# Patient Record
Sex: FEMALE | Race: BLACK OR AFRICAN AMERICAN | NOT HISPANIC OR LATINO | Employment: OTHER | URBAN - METROPOLITAN AREA
[De-identification: names, ages, dates, MRNs, and addresses within clinical notes are randomized per-mention and may not be internally consistent; named-entity substitution may affect disease eponyms.]

---

## 2020-05-27 ENCOUNTER — TELEPHONE (OUTPATIENT)
Dept: GASTROENTEROLOGY | Facility: CLINIC | Age: 70
End: 2020-05-27

## 2020-07-15 ENCOUNTER — OFFICE VISIT (OUTPATIENT)
Dept: GASTROENTEROLOGY | Facility: CLINIC | Age: 70
End: 2020-07-15

## 2020-07-15 VITALS
DIASTOLIC BLOOD PRESSURE: 82 MMHG | TEMPERATURE: 97.3 F | SYSTOLIC BLOOD PRESSURE: 122 MMHG | OXYGEN SATURATION: 98 % | BODY MASS INDEX: 33.75 KG/M2 | HEART RATE: 64 BPM | HEIGHT: 66 IN | WEIGHT: 210 LBS

## 2020-07-15 DIAGNOSIS — K21.9 GASTROESOPHAGEAL REFLUX DISEASE, ESOPHAGITIS PRESENCE NOT SPECIFIED: ICD-10-CM

## 2020-07-15 DIAGNOSIS — R10.11 RIGHT UPPER QUADRANT ABDOMINAL PAIN: ICD-10-CM

## 2020-07-15 DIAGNOSIS — R74.8 ELEVATED LIVER ENZYMES: Primary | ICD-10-CM

## 2020-07-15 DIAGNOSIS — Z86.010 HISTORY OF COLON POLYPS: ICD-10-CM

## 2020-07-15 DIAGNOSIS — K31.84 GASTROPARESIS: ICD-10-CM

## 2020-07-15 PROCEDURE — 99214 OFFICE O/P EST MOD 30 MIN: CPT | Performed by: NURSE PRACTITIONER

## 2020-07-15 RX ORDER — ATORVASTATIN CALCIUM 40 MG/1
40 TABLET, FILM COATED ORAL DAILY
COMMUNITY
Start: 2020-05-07 | End: 2022-07-25

## 2020-07-15 RX ORDER — METOPROLOL SUCCINATE 50 MG/1
50 TABLET, EXTENDED RELEASE ORAL DAILY
COMMUNITY
End: 2022-11-03 | Stop reason: SDUPTHER

## 2020-07-15 RX ORDER — AMLODIPINE BESYLATE 10 MG/1
10 TABLET ORAL DAILY
COMMUNITY

## 2020-07-15 RX ORDER — VITAMIN B COMPLEX
1 CAPSULE ORAL DAILY
COMMUNITY
End: 2022-08-24

## 2020-07-15 RX ORDER — FUROSEMIDE 20 MG/1
20 TABLET ORAL DAILY
COMMUNITY
Start: 2020-05-05

## 2020-07-15 RX ORDER — PANTOPRAZOLE SODIUM 40 MG/1
TABLET, DELAYED RELEASE ORAL
COMMUNITY
Start: 2020-05-05 | End: 2020-07-15 | Stop reason: SDUPTHER

## 2020-07-15 RX ORDER — POTASSIUM CHLORIDE 750 MG/1
TABLET, FILM COATED, EXTENDED RELEASE ORAL
Status: ON HOLD | COMMUNITY
Start: 2020-06-21 | End: 2021-03-08

## 2020-07-15 RX ORDER — LOSARTAN POTASSIUM 50 MG/1
50 TABLET ORAL DAILY
COMMUNITY
Start: 2020-05-06

## 2020-07-15 RX ORDER — BIMATOPROST 0.01 %
1 DROPS OPHTHALMIC (EYE) NIGHTLY
COMMUNITY
Start: 2020-04-11

## 2020-07-15 RX ORDER — PANTOPRAZOLE SODIUM 40 MG/1
40 TABLET, DELAYED RELEASE ORAL DAILY
Qty: 90 TABLET | Refills: 3 | Status: SHIPPED | OUTPATIENT
Start: 2020-07-15 | End: 2021-07-15

## 2020-07-15 RX ORDER — MULTIVITAMIN
1 TABLET ORAL DAILY
COMMUNITY

## 2020-07-15 NOTE — PROGRESS NOTES
Heartburn and GI Problem      HPI  70-year-old female presents the office today for follow-up.  She was a patient in her previous practice and was last seen in office on 11/14/2019.  She has a history of hiatal hernia, Nissen fundoplication, delayed gastric emptying, GERD, elevated liver enzymes and intermittent diarrhea.    She reports experiencing heartburn and reflux symptoms 2 to 3 days/week.  Symptoms and occur at anytime during the day or night she can experience a burning/discomfort in her epigastric area.  She reports eating early in the evening and goes to bed between 9-9:30 pm each night.  She has had to utilize TUMS on an almost daily basis for management of symptoms.  Tomatoes will worsen symptoms.  She denies any nausea, vomiting, or dysphasia.  She continues pantoprazole 40 mg once daily, but takes this medication before bedtime.  Her most recent EGD was performed on 11/14/2018 and revealed LA grade B esophagitis, evidence of previous Nissen fundoplication, and fundic gland polyps.  Pathology revealed mild inflammation at the GE junction.    She has a history of delayed gastric emptying, as evidenced by gastric emptying study on 11/21/2018.  She reports eating smaller more frequent meals, but does not currently take any medication for gastroparesis.    She reports having normal daily bowel movements and denies having any lower abdominal pain, diarrhea, constipation, melena, or hematochezia.  Most recent colonoscopy was performed on 11/14/2018 and revealed 3 colon polyps, all of which were adenomatous.  She was placed in a 3-year recall and will be due for her next surveillance colonoscopy 11/14/2021.    She has a history of elevated liver enzymes and underwent work-up in 2019.  She was found to have an elevated AZALIA and IgG; however smooth muscle antibodies were negative and transaminases were not markedly elevated.  She was recommended to have a follow-up evaluation of liver enzymes in January 2020.   She reports experiencing some sharp right upper quadrant abdominal pain that occurred approximately 2 weeks ago.  Pain lasted approximately 10 to 15 minutes and then went away after use of 2 Advil.  She denies having any further pain since.  She is unsure if this was secondary to her liver or if she was having some sort of muscle spasm.  She reports having recent lab work 1 month ago through her PCP, Dr. Cobb's office.  She states that her liver enzymes had improved over her previous values.  This lab work is not available for review, but we will request.    Review of Systems   Constitutional: Negative for appetite change, chills, diaphoresis, fatigue, fever and unexpected weight change.   HENT: Negative for dental problem, ear pain, mouth sores, rhinorrhea, sore throat and voice change.    Eyes: Negative for pain, redness and visual disturbance.   Respiratory: Positive for cough. Negative for chest tightness and wheezing.    Cardiovascular: Negative for chest pain, palpitations and leg swelling.   Endocrine: Negative for cold intolerance, heat intolerance, polydipsia, polyphagia and polyuria.   Genitourinary: Negative for dysuria, frequency, hematuria and urgency.   Musculoskeletal: Negative for arthralgias, back pain, joint swelling, myalgias and neck pain.   Skin: Negative for rash.   Allergic/Immunologic: Negative for environmental allergies, food allergies and immunocompromised state.   Neurological: Negative for dizziness, seizures, weakness, numbness and headaches.   Hematological: Does not bruise/bleed easily.   Psychiatric/Behavioral: Negative for sleep disturbance. The patient is not nervous/anxious.         Problem List:  There is no problem list on file for this patient.      Medical History:    Past Medical History:   Diagnosis Date   • Cataract    • GERD (gastroesophageal reflux disease)    • Hyperlipidemia    • Hypertension    • Knee pain         Social History:    Social History     Socioeconomic  History   • Marital status:      Spouse name: Not on file   • Number of children: Not on file   • Years of education: Not on file   • Highest education level: Not on file   Tobacco Use   • Smoking status: Former Smoker   • Smokeless tobacco: Never Used   Substance and Sexual Activity   • Alcohol use: Yes   • Drug use: Never   • Sexual activity: Defer       Family History:   Family History   Problem Relation Age of Onset   • Alzheimer's disease Mother    • Kidney failure Father    • Alzheimer's disease Brother    • Lung cancer Brother    • Stomach cancer Brother    • Hypertension Neg Hx        Surgical History:   Past Surgical History:   Procedure Laterality Date   • COLONOSCOPY     • HEMORROIDECTOMY     • HERNIA REPAIR     • HYSTERECTOMY     • KNEE SURGERY     • TUBAL ABDOMINAL LIGATION     • UPPER GASTROINTESTINAL ENDOSCOPY           Current Outpatient Medications:   •  amLODIPine (NORVASC) 10 MG tablet, Take 10 mg by mouth Daily., Disp: , Rfl:   •  Aspirin Buf,CaCarb-MgCarb-MgO, 81 MG tablet, Take 81 mg by mouth Daily., Disp: , Rfl:   •  atorvastatin (LIPITOR) 40 MG tablet, , Disp: , Rfl:   •  b complex vitamins capsule, Take 1 capsule by mouth Daily., Disp: , Rfl:   •  furosemide (LASIX) 20 MG tablet, , Disp: , Rfl:   •  losartan (COZAAR) 50 MG tablet, , Disp: , Rfl:   •  LUMIGAN 0.01 % ophthalmic drops, , Disp: , Rfl:   •  metoprolol succinate XL (TOPROL-XL) 50 MG 24 hr tablet, Take 50 mg by mouth Daily., Disp: , Rfl:   •  Multiple Vitamin (MULTIVITAMIN) tablet, Take 1 tablet by mouth Daily., Disp: , Rfl:   •  pantoprazole (PROTONIX) 40 MG EC tablet, Take 1 tablet by mouth Daily. Take 1 hour before first meal of the day, Disp: 90 tablet, Rfl: 3  •  potassium chloride (K-DUR) 10 MEQ CR tablet, , Disp: , Rfl:     Allergies:   Allergies   Allergen Reactions   • Fish Oil Itching   • Rocuronium Anaphylaxis   • Shellfish-Derived Products Nausea And Vomiting        The following portions of the patient's history  were reviewed and updated as appropriate: allergies, current medications, past family history, past medical history, past social history, past surgical history and problem list.    Vitals:    07/15/20 1023   BP: 122/82   Pulse: 64   Temp: 97.3 °F (36.3 °C)   SpO2: 98%       Physical Exam   Constitutional: She is oriented to person, place, and time. She appears well-developed and well-nourished.   Pulmonary/Chest: Effort normal. No respiratory distress.   Abdominal: Soft. Bowel sounds are normal. She exhibits no distension and no mass. There is tenderness. There is no guarding.   Mild abdominal pain noted on palpation of epigastric area and right upper quadrant area   Musculoskeletal: Normal range of motion.   Neurological: She is alert and oriented to person, place, and time.   Skin: Skin is warm and dry.   Psychiatric: She has a normal mood and affect. Her behavior is normal. Judgment and thought content normal.   Vitals reviewed.      Assessment/ Plan  Esme was seen today for heartburn and gi problem.    Diagnoses and all orders for this visit:    Elevated liver enzymes  -     US Abdomen Limited; Future    Right upper quadrant abdominal pain  -     US Abdomen Limited; Future    Gastroesophageal reflux disease, esophagitis presence not specified    History of colon polyps    Gastroparesis    Other orders  -     pantoprazole (PROTONIX) 40 MG EC tablet; Take 1 tablet by mouth Daily. Take 1 hour before first meal of the day         Return in about 6 months (around 1/15/2021).      Discussion:  Obtain the patient's most recent lab work from her PCP, Dr. Rich's office for our review to assess her liver enzymes.  Due to reports of right upper quadrant abdominal pain we will also obtain an ultrasound.    For GERD, we will have the patient change the timing of her pantoprazole and take 40 mg an hour before her first meal of the day.  Patient was encouraged to avoid eating 3 to 4 hours before bedtime and to avoid  any known trigger foods.  May continue to use Tums on an as-needed basis.  Based upon patient's response, may need to consider adding famotidine 40 mg at bedtime to her regimen.  Could continue eating smaller more frequent meals for management of gastroparesis.    For her history of colon polyps, we will place her in recall for her next surveillance colonoscopy, which will be due 11/14/2021.  Plan for office follow-up in 6 months for reassessment of symptoms, or sooner should symptoms worsen.  Patient verbalized understanding of above plan of care and is in agreement. All questions answered and support provided.    Patient's lab work was received from Dr. Cobb's office.  Performed on 6/4/2020 demonstrated an AST of 52 and ALT of 56.  We will plan to proceed with right upper quadrant ultrasound for further evaluation and provide further recommendations once results have returned.  Johnnie GONZALES

## 2020-07-15 NOTE — PATIENT INSTRUCTIONS
1.  For GERD, we will have you change the timing of your pantoprazole to 1 hour before your first meal of the day.  Refills have been sent to [facility].  May continue to use TUMS on an as-needed basis for breakthrough symptoms.    2.  For GERD, we recommend avoiding eating 3-4 hours before bedtime, eating smaller more frequent meals, and avoiding any known food triggers including spicy foods, tomatoes and tomato-based sauces, chocolate, coffee/tea, citrus fruits, carbonated  beverages and alcohol.     3.  For your history of elevated liver enzymes, we will contact Dr. Cobb's office to request her most recent lab work for our review.  If additional lab work is needed we will contact you.    4.  For further evaluation of right upper quadrant abdominal pain in the setting of elevated liver enzymes we will also order a right upper quadrant ultrasound.    5.  For delayed gastric emptying, we recommend eating smaller more frequent meals and avoiding foods that are high in fiber and fat.    6.  For your history of colon polyps, we will place you in our electronic reminder system for your next surveillance colonoscopy November 14, 2021.    7.  Plan for office follow-up in 6 months for reassessment of symptoms, or sooner should symptoms worsen.

## 2020-07-24 ENCOUNTER — HOSPITAL ENCOUNTER (OUTPATIENT)
Dept: ULTRASOUND IMAGING | Facility: HOSPITAL | Age: 70
Discharge: HOME | End: 2020-07-24
Admitting: NURSE PRACTITIONER

## 2020-07-24 DIAGNOSIS — R10.11 RIGHT UPPER QUADRANT ABDOMINAL PAIN: ICD-10-CM

## 2020-07-24 DIAGNOSIS — R74.8 ELEVATED LIVER ENZYMES: ICD-10-CM

## 2020-07-24 PROCEDURE — 76705 ECHO EXAM OF ABDOMEN: CPT

## 2020-07-29 DIAGNOSIS — R10.11 RIGHT UPPER QUADRANT ABDOMINAL PAIN: Primary | ICD-10-CM

## 2020-11-04 ENCOUNTER — HOSPITAL ENCOUNTER (OUTPATIENT)
Dept: ULTRASOUND IMAGING | Facility: HOSPITAL | Age: 70
Discharge: HOME | End: 2020-11-04
Admitting: NURSE PRACTITIONER

## 2020-11-04 DIAGNOSIS — R10.11 RIGHT UPPER QUADRANT ABDOMINAL PAIN: ICD-10-CM

## 2020-11-04 PROCEDURE — 76705 ECHO EXAM OF ABDOMEN: CPT

## 2020-11-09 DIAGNOSIS — K82.4 POLYP OF GALLBLADDER: Primary | ICD-10-CM

## 2021-02-05 ENCOUNTER — OFFICE VISIT (OUTPATIENT)
Dept: GASTROENTEROLOGY | Facility: CLINIC | Age: 71
End: 2021-02-05

## 2021-02-05 ENCOUNTER — PREP FOR SURGERY (OUTPATIENT)
Dept: SURGERY | Facility: SURGERY CENTER | Age: 71
End: 2021-02-05

## 2021-02-05 VITALS
HEIGHT: 66 IN | SYSTOLIC BLOOD PRESSURE: 132 MMHG | DIASTOLIC BLOOD PRESSURE: 78 MMHG | BODY MASS INDEX: 34.07 KG/M2 | WEIGHT: 212 LBS | HEART RATE: 71 BPM | TEMPERATURE: 97.3 F | OXYGEN SATURATION: 98 %

## 2021-02-05 DIAGNOSIS — R74.8 ELEVATED LIVER ENZYMES: ICD-10-CM

## 2021-02-05 DIAGNOSIS — Z86.010 HISTORY OF COLONIC POLYPS: ICD-10-CM

## 2021-02-05 DIAGNOSIS — R10.13 EPIGASTRIC PAIN: ICD-10-CM

## 2021-02-05 DIAGNOSIS — Z98.890 HISTORY OF NISSEN FUNDOPLICATION: ICD-10-CM

## 2021-02-05 DIAGNOSIS — Z12.11 COLON CANCER SCREENING: ICD-10-CM

## 2021-02-05 DIAGNOSIS — K21.9 GASTROESOPHAGEAL REFLUX DISEASE, UNSPECIFIED WHETHER ESOPHAGITIS PRESENT: Primary | ICD-10-CM

## 2021-02-05 DIAGNOSIS — R10.11 RIGHT UPPER QUADRANT ABDOMINAL PAIN: ICD-10-CM

## 2021-02-05 DIAGNOSIS — K21.9 GASTROESOPHAGEAL REFLUX DISEASE, UNSPECIFIED WHETHER ESOPHAGITIS PRESENT: Primary | Chronic | ICD-10-CM

## 2021-02-05 PROBLEM — Z86.0100 HISTORY OF COLONIC POLYPS: Status: ACTIVE | Noted: 2021-02-05

## 2021-02-05 PROCEDURE — 99214 OFFICE O/P EST MOD 30 MIN: CPT | Performed by: NURSE PRACTITIONER

## 2021-02-05 RX ORDER — SODIUM CHLORIDE 0.9 % (FLUSH) 0.9 %
3 SYRINGE (ML) INJECTION EVERY 12 HOURS SCHEDULED
Status: CANCELLED | OUTPATIENT
Start: 2021-02-05

## 2021-02-05 RX ORDER — SODIUM CHLORIDE 0.9 % (FLUSH) 0.9 %
10 SYRINGE (ML) INJECTION AS NEEDED
Status: CANCELLED | OUTPATIENT
Start: 2021-02-05

## 2021-02-05 RX ORDER — SODIUM CHLORIDE, SODIUM LACTATE, POTASSIUM CHLORIDE, CALCIUM CHLORIDE 600; 310; 30; 20 MG/100ML; MG/100ML; MG/100ML; MG/100ML
30 INJECTION, SOLUTION INTRAVENOUS CONTINUOUS PRN
Status: CANCELLED | OUTPATIENT
Start: 2021-02-05

## 2021-02-05 NOTE — PROGRESS NOTES
"Chief Complaint   Patient presents with   • Follow-up     6 mon f/u    • Burn     Burning of the stomach              History of Present Illness  70-year-old female presents the office today for follow-up.  She was last seen in office on 7/15/2020.  She has a history of Nissen fundoplication, delayed gastric emptying, GERD, elevated liver enzymes, and intermittent diarrhea.    At her last office visit we had her change the timing of her pantoprazole prior to her evening meal and recommended use of Tums on an as-needed basis for worsening epigastric pain.  She reports changing the timing of her pantoprazole and adding Pepcid into her regimen.  However, she reports having to take a significant amount of Pepcid each day to help manage symptoms with no improvement.  She has been experiencing severe epigastric burning for several weeks, but states over the past few days symptoms have slightly improved.  Last EGD noted below November 2018 revealed LA grade B esophagitis, and previous evidence of Nissen fundoplication which appeared to possibly be loose, as well as some gastric polyps.  She denies any nausea, vomiting, or dysphagia.    She also reports persistent right upper quadrant abdominal pain, particularly over the past 1.5 weeks.  She describes the discomfort as a \"knot\" in her right upper quadrant.  Use of Advil did ease the pain, but she tries to limit use.  Eating does not seem to affect symptoms.  She reports 2 episodes that occurred last week. Follow-up right upper quadrant ultrasound performed on 11/4/2020 demonstrated stability of 0.4 cm gallbladder polyp versus nonshadowing gallstone, otherwise was unremarkable.  She has not had a HIDA scan.    She has a history of elevated liver enzymes underwent full liver lab work-up in 2019 which demonstrated an elevated AZALIA and IgG, but smooth muscle antibodies were negative and transaminases were not markedly elevated.  Lab work was previously requested from  " "Layla's office, but was not received.  We will request again.    She has a history of colon polyps and is due for her next surveillance colonoscopy on 11/14/2021.  She reports occasional constipation and has questions regarding use of Metamucil.  She reports an average of one bowel movement per day denies any family history of colon cancer.    Review of Systems   HENT: Negative for trouble swallowing.    Cardiovascular: Negative for chest pain.   Gastrointestinal: Positive for abdominal pain and constipation. Negative for abdominal distention, anal bleeding, blood in stool, diarrhea, nausea, rectal pain and vomiting.        Result Review :      Office Visit with Shaye Coates APRN (07/15/2020)  SCANNED - COLONOSCOPY (11/14/2018)  ENDOSCOPY, INT (11/14/2018)  SCANNED PATHOLOGY (11/14/2018)  US Abdomen Limited (11/04/2020 11:02)      Vital Signs:   /78   Pulse 71   Temp 97.3 °F (36.3 °C)   Ht 166.4 cm (65.51\")   Wt 96.2 kg (212 lb)   SpO2 98%   BMI 34.73 kg/m²     Body mass index is 34.73 kg/m².     Physical Exam  Vitals signs reviewed.   Constitutional:       Appearance: Normal appearance.   Pulmonary:      Effort: Pulmonary effort is normal. No respiratory distress.   Abdominal:      General: Abdomen is flat. Bowel sounds are normal. There is no distension.      Palpations: Abdomen is soft. There is no mass.      Tenderness: There is abdominal tenderness. There is no guarding or rebound.      Comments: Mild epigastric pain noted on palpation   Musculoskeletal: Normal range of motion.   Skin:     General: Skin is warm and dry.   Neurological:      General: No focal deficit present.      Mental Status: She is alert and oriented to person, place, and time.   Psychiatric:         Mood and Affect: Mood normal.         Behavior: Behavior normal.         Thought Content: Thought content normal.         Judgment: Judgment normal.          Assessment and Plan      Diagnoses and all orders for this " visit:    1. Gastroesophageal reflux disease, unspecified whether esophagitis present (Primary)  Comments:  Worsening      2. Epigastric pain    3. History of Nissen fundoplication    4. History of colonic polyps    5. Colon cancer screening    6. Elevated liver enzymes  -     Hepatic Function Panel    7. Right upper quadrant abdominal pain            Follow Up   Return in about 4 weeks (around 3/5/2021).     Patient Instructions   1.  For GERD, continue pantoprazole 40 mg 1 hour before your evening meal.  You may continue to use Pepcid up to twice daily as needed for breakthrough symptoms.    2.  For GERD, we recommend avoiding eating 3-4 hours before bedtime, eating smaller more frequent meals, and avoiding any known food triggers including spicy foods, tomatoes and tomato-based sauces, chocolate, coffee/tea, citrus fruits, carbonated  beverages and alcohol.     3.  For further evaluation of worsening epigastric pain we will proceed with EGD for further evaluation.    4.  Due to your history of colon polyps, we will go ahead and schedule your surveillance colonoscopy as well.    5.  Due to your history of elevated liver enzymes, we will check a hepatic function panel today.    6.  To help regulate bowel habits we recommend use of Metamucil daily, starting off with a low dose and titrating accordingly to help minimize gas that could occur.    7.  Plan for follow-up office visit 4 weeks after procedures to review findings, reassess symptoms and discuss plan of care moving forward.        Discussion:    For worsening epigastric pain and GERD, we will proceed with EGD for further evaluation.  We will have the patient continue pantoprazole 40 mg before her evening meal as well as continue to use Pepcid for symptom management.  We have recommended she implement antireflux precautions and will proceed with EGD for further evaluation.  Symptoms could be partially due to a slipped Nissen, which may need to be further  evaluated on esophagram.  However, we will defer this testing until after EGD has been completed.     In regards to the patient's right upper quadrant abdominal discomfort, we will defer referral to general surgery until after EGD has been completed to determine if patient also needs to be evaluated for repair of her previous Nissen fundoplication.    Due to her history of colon polyps and the fact that she is due for her surveillance colonoscopy this year, we will go ahead and proceed with colonoscopy so as to avoid a second procedure later this year with anesthesia, given her age.  We have recommended that she utilize Metamucil on a daily basis for management of constipation.  We plan for office follow-up 4 weeks after procedure to discuss results, reassess symptoms, and devise plan of care moving forward.  Patient verbalized understanding of above plan of care and is in agreement.  All questions answered and support provided.

## 2021-02-05 NOTE — PATIENT INSTRUCTIONS
1.  For GERD, continue pantoprazole 40 mg 1 hour before your evening meal.  You may continue to use Pepcid up to twice daily as needed for breakthrough symptoms.    2.  For GERD, we recommend avoiding eating 3-4 hours before bedtime, eating smaller more frequent meals, and avoiding any known food triggers including spicy foods, tomatoes and tomato-based sauces, chocolate, coffee/tea, citrus fruits, carbonated  beverages and alcohol.     3.  For further evaluation of worsening epigastric pain we will proceed with EGD for further evaluation.    4.  Due to your history of colon polyps, we will go ahead and schedule your surveillance colonoscopy as well.    5.  Due to your history of elevated liver enzymes, we will check a hepatic function panel today.    6.  To help regulate bowel habits we recommend use of Metamucil daily, starting off with a low dose and titrating accordingly to help minimize gas that could occur.    7.  Plan for follow-up office visit 4 weeks after procedures to review findings, reassess symptoms and discuss plan of care moving forward.

## 2021-02-15 ENCOUNTER — TRANSCRIBE ORDERS (OUTPATIENT)
Dept: LAB | Facility: SURGERY CENTER | Age: 71
End: 2021-02-15

## 2021-02-15 DIAGNOSIS — Z01.818 OTHER SPECIFIED PRE-OPERATIVE EXAMINATION: Primary | ICD-10-CM

## 2021-02-25 DIAGNOSIS — R10.11 RIGHT UPPER QUADRANT ABDOMINAL PAIN: ICD-10-CM

## 2021-02-25 DIAGNOSIS — R74.8 ELEVATED LIVER ENZYMES: Primary | ICD-10-CM

## 2021-02-26 ENCOUNTER — LAB (OUTPATIENT)
Dept: LAB | Facility: HOSPITAL | Age: 71
End: 2021-02-26

## 2021-02-26 LAB
IGA1 MFR SER: 206 MG/DL (ref 70–400)
IGG1 SER-MCNC: 1628 MG/DL (ref 700–1600)
IGM SERPL-MCNC: 46 MG/DL (ref 40–230)

## 2021-02-26 PROCEDURE — 82784 ASSAY IGA/IGD/IGG/IGM EACH: CPT | Performed by: INTERNAL MEDICINE

## 2021-02-26 PROCEDURE — 36415 COLL VENOUS BLD VENIPUNCTURE: CPT | Performed by: INTERNAL MEDICINE

## 2021-02-26 PROCEDURE — 83516 IMMUNOASSAY NONANTIBODY: CPT | Performed by: INTERNAL MEDICINE

## 2021-02-27 LAB — ACTIN IGG SERPL-ACNC: 24 UNITS (ref 0–19)

## 2021-03-01 DIAGNOSIS — K75.9 INFLAMMATORY LIVER DISEASE, UNSPECIFIED: ICD-10-CM

## 2021-03-01 DIAGNOSIS — R79.89 ELEVATED LFTS: Primary | ICD-10-CM

## 2021-03-01 DIAGNOSIS — K75.4 AUTOIMMUNE HEPATITIS (HCC): ICD-10-CM

## 2021-03-04 NOTE — SIGNIFICANT NOTE
Education provided the Patient on the following:    - Nothing to Eat or Drink after MN the night before the procedure  -Your required COVID Test is Scheduled on   3/5/21       Between the Hours of [id number]  -You will only be notified if your COVID test Result is POSITIVE  -The importance of reducing your number of contacts by self quarantining after you COVID test until the date of your Colonoscopy and EGD    - Avoid red/purple fluids while completing their bowel prep as ordered by physician  -Contact Gastrointerologist office for any questions about specific details regarding colon prep    -You will need to have someone drive you home after your colonoscopy and remain with you for 24 hours after the procedure  - The date of your procedure, your ride is welcome to either remain in our parking lot or within 10-15 minutes of [facility] [place]  -Please wear warm socks when you arrive for your procedure  -Remove all jewelry and leave any valuables before arriving the day of your procedure (all will have to be removed before leaving preop)  -You will need to arrive at  0930         on  3/8/21         for your procedure    -Feel free to contact us at: [phone] with any additional questions/concerns         Has the patient ever tested Positive COVID?     If so when?

## 2021-03-05 ENCOUNTER — LAB (OUTPATIENT)
Dept: LAB | Facility: HOSPITAL | Age: 71
End: 2021-03-05

## 2021-03-05 ENCOUNTER — LAB (OUTPATIENT)
Dept: LAB | Facility: SURGERY CENTER | Age: 71
End: 2021-03-05

## 2021-03-05 DIAGNOSIS — Z01.818 OTHER SPECIFIED PRE-OPERATIVE EXAMINATION: ICD-10-CM

## 2021-03-05 LAB
ALBUMIN SERPL-MCNC: 3.8 G/DL (ref 3.5–5.2)
ALBUMIN/GLOB SERPL: 1.1 G/DL
ALP SERPL-CCNC: 101 U/L (ref 39–117)
ALPHA1 GLOB MFR UR ELPH: 137 MG/DL (ref 90–200)
ALT SERPL W P-5'-P-CCNC: 111 U/L (ref 1–33)
ANION GAP SERPL CALCULATED.3IONS-SCNC: 9.4 MMOL/L (ref 5–15)
AST SERPL-CCNC: 112 U/L (ref 1–32)
BASOPHILS # BLD AUTO: 0.04 10*3/MM3 (ref 0–0.2)
BASOPHILS NFR BLD AUTO: 0.6 % (ref 0–1.5)
BILIRUB SERPL-MCNC: 1.2 MG/DL (ref 0–1.2)
BUN SERPL-MCNC: 15 MG/DL (ref 8–23)
BUN/CREAT SERPL: 18.1 (ref 7–25)
CALCIUM SPEC-SCNC: 9.9 MG/DL (ref 8.6–10.5)
CERULOPLASMIN SERPL-MCNC: 22 MG/DL (ref 19–39)
CHLORIDE SERPL-SCNC: 104 MMOL/L (ref 98–107)
CO2 SERPL-SCNC: 24.6 MMOL/L (ref 22–29)
CREAT SERPL-MCNC: 0.83 MG/DL (ref 0.57–1)
DEPRECATED RDW RBC AUTO: 47 FL (ref 37–54)
EOSINOPHIL # BLD AUTO: 0.15 10*3/MM3 (ref 0–0.4)
EOSINOPHIL NFR BLD AUTO: 2.3 % (ref 0.3–6.2)
ERYTHROCYTE [DISTWIDTH] IN BLOOD BY AUTOMATED COUNT: 13.3 % (ref 12.3–15.4)
FERRITIN SERPL-MCNC: 197 NG/ML (ref 13–150)
GFR SERPL CREATININE-BSD FRML MDRD: 82 ML/MIN/1.73
GGT SERPL-CCNC: 224 U/L (ref 5–36)
GLOBULIN UR ELPH-MCNC: 3.6 GM/DL
GLUCOSE SERPL-MCNC: 101 MG/DL (ref 65–99)
HAV IGM SERPL QL IA: NORMAL
HBV CORE IGM SERPL QL IA: NORMAL
HBV SURFACE AG SERPL QL IA: NORMAL
HCT VFR BLD AUTO: 46.7 % (ref 34–46.6)
HCV AB SER DONR QL: NORMAL
HGB BLD-MCNC: 14.8 G/DL (ref 12–15.9)
IGA1 MFR SER: 216 MG/DL (ref 70–400)
IGG1 SER-MCNC: 1580 MG/DL (ref 700–1600)
IGM SERPL-MCNC: 46 MG/DL (ref 40–230)
IMM GRANULOCYTES # BLD AUTO: 0.02 10*3/MM3 (ref 0–0.05)
IMM GRANULOCYTES NFR BLD AUTO: 0.3 % (ref 0–0.5)
IRON 24H UR-MRATE: 113 MCG/DL (ref 37–145)
IRON SATN MFR SERPL: 27 % (ref 20–50)
LYMPHOCYTES # BLD AUTO: 2.43 10*3/MM3 (ref 0.7–3.1)
LYMPHOCYTES NFR BLD AUTO: 37 % (ref 19.6–45.3)
MCH RBC QN AUTO: 30.1 PG (ref 26.6–33)
MCHC RBC AUTO-ENTMCNC: 31.7 G/DL (ref 31.5–35.7)
MCV RBC AUTO: 94.9 FL (ref 79–97)
MONOCYTES # BLD AUTO: 0.89 10*3/MM3 (ref 0.1–0.9)
MONOCYTES NFR BLD AUTO: 13.6 % (ref 5–12)
NEUTROPHILS NFR BLD AUTO: 3.03 10*3/MM3 (ref 1.7–7)
NEUTROPHILS NFR BLD AUTO: 46.2 % (ref 42.7–76)
NRBC BLD AUTO-RTO: 0 /100 WBC (ref 0–0.2)
PLATELET # BLD AUTO: 184 10*3/MM3 (ref 140–450)
PMV BLD AUTO: 10.8 FL (ref 6–12)
POTASSIUM SERPL-SCNC: 3.4 MMOL/L (ref 3.5–5.2)
PROT SERPL-MCNC: 7.4 G/DL (ref 6–8.5)
RBC # BLD AUTO: 4.92 10*6/MM3 (ref 3.77–5.28)
SARS-COV-2 ORF1AB RESP QL NAA+PROBE: NOT DETECTED
SODIUM SERPL-SCNC: 138 MMOL/L (ref 136–145)
TIBC SERPL-MCNC: 423 MCG/DL (ref 298–536)
TRANSFERRIN SERPL-MCNC: 284 MG/DL (ref 200–360)
WBC # BLD AUTO: 6.56 10*3/MM3 (ref 3.4–10.8)

## 2021-03-05 PROCEDURE — U0004 COV-19 TEST NON-CDC HGH THRU: HCPCS | Performed by: SURGERY

## 2021-03-05 PROCEDURE — 86225 DNA ANTIBODY NATIVE: CPT | Performed by: INTERNAL MEDICINE

## 2021-03-05 PROCEDURE — 85025 COMPLETE CBC W/AUTO DIFF WBC: CPT | Performed by: INTERNAL MEDICINE

## 2021-03-05 PROCEDURE — 82103 ALPHA-1-ANTITRYPSIN TOTAL: CPT | Performed by: INTERNAL MEDICINE

## 2021-03-05 PROCEDURE — 83516 IMMUNOASSAY NONANTIBODY: CPT | Performed by: INTERNAL MEDICINE

## 2021-03-05 PROCEDURE — 82784 ASSAY IGA/IGD/IGG/IGM EACH: CPT | Performed by: INTERNAL MEDICINE

## 2021-03-05 PROCEDURE — 80074 ACUTE HEPATITIS PANEL: CPT | Performed by: INTERNAL MEDICINE

## 2021-03-05 PROCEDURE — 83540 ASSAY OF IRON: CPT | Performed by: INTERNAL MEDICINE

## 2021-03-05 PROCEDURE — 82390 ASSAY OF CERULOPLASMIN: CPT | Performed by: INTERNAL MEDICINE

## 2021-03-05 PROCEDURE — 86255 FLUORESCENT ANTIBODY SCREEN: CPT | Performed by: INTERNAL MEDICINE

## 2021-03-05 PROCEDURE — 86376 MICROSOMAL ANTIBODY EACH: CPT | Performed by: INTERNAL MEDICINE

## 2021-03-05 PROCEDURE — 80053 COMPREHEN METABOLIC PANEL: CPT | Performed by: INTERNAL MEDICINE

## 2021-03-05 PROCEDURE — C9803 HOPD COVID-19 SPEC COLLECT: HCPCS

## 2021-03-05 PROCEDURE — 82977 ASSAY OF GGT: CPT | Performed by: INTERNAL MEDICINE

## 2021-03-05 PROCEDURE — 86038 ANTINUCLEAR ANTIBODIES: CPT | Performed by: INTERNAL MEDICINE

## 2021-03-05 PROCEDURE — 36415 COLL VENOUS BLD VENIPUNCTURE: CPT | Performed by: INTERNAL MEDICINE

## 2021-03-05 PROCEDURE — 82728 ASSAY OF FERRITIN: CPT | Performed by: INTERNAL MEDICINE

## 2021-03-05 PROCEDURE — U0005 INFEC AGEN DETEC AMPLI PROBE: HCPCS | Performed by: SURGERY

## 2021-03-05 PROCEDURE — 84466 ASSAY OF TRANSFERRIN: CPT | Performed by: INTERNAL MEDICINE

## 2021-03-06 LAB
ACTIN IGG SERPL-ACNC: 28 UNITS (ref 0–19)
LKM-1 AB SER-ACNC: 2.3 UNITS (ref 0–20)
MITOCHONDRIA M2 IGG SER-ACNC: 20.5 UNITS (ref 0–20)

## 2021-03-07 LAB
ANA SER QL: POSITIVE
DSDNA AB SER-ACNC: 2 IU/ML (ref 0–9)
Lab: NORMAL

## 2021-03-08 ENCOUNTER — ANESTHESIA EVENT (OUTPATIENT)
Dept: SURGERY | Facility: SURGERY CENTER | Age: 71
End: 2021-03-08

## 2021-03-08 ENCOUNTER — ANESTHESIA (OUTPATIENT)
Dept: SURGERY | Facility: SURGERY CENTER | Age: 71
End: 2021-03-08

## 2021-03-08 ENCOUNTER — HOSPITAL ENCOUNTER (OUTPATIENT)
Facility: SURGERY CENTER | Age: 71
Setting detail: HOSPITAL OUTPATIENT SURGERY
Discharge: HOME | End: 2021-03-08
Attending: INTERNAL MEDICINE | Admitting: INTERNAL MEDICINE

## 2021-03-08 VITALS
BODY MASS INDEX: 33.43 KG/M2 | TEMPERATURE: 98.2 F | HEART RATE: 82 BPM | WEIGHT: 208 LBS | OXYGEN SATURATION: 95 % | DIASTOLIC BLOOD PRESSURE: 74 MMHG | HEIGHT: 66 IN | SYSTOLIC BLOOD PRESSURE: 122 MMHG | RESPIRATION RATE: 16 BRPM

## 2021-03-08 DIAGNOSIS — K21.9 GASTROESOPHAGEAL REFLUX DISEASE, UNSPECIFIED WHETHER ESOPHAGITIS PRESENT: ICD-10-CM

## 2021-03-08 DIAGNOSIS — Z98.890 HISTORY OF NISSEN FUNDOPLICATION: ICD-10-CM

## 2021-03-08 DIAGNOSIS — Z86.010 HISTORY OF COLONIC POLYPS: ICD-10-CM

## 2021-03-08 DIAGNOSIS — Z12.11 COLON CANCER SCREENING: ICD-10-CM

## 2021-03-08 DIAGNOSIS — R10.13 EPIGASTRIC PAIN: ICD-10-CM

## 2021-03-08 LAB
ENDOMYSIUM IGA SER QL: NEGATIVE
IGA SERPL-MCNC: 208 MG/DL (ref 64–422)
TTG IGA SER-ACNC: <2 U/ML (ref 0–3)

## 2021-03-08 PROCEDURE — 25010000002 PROPOFOL 10 MG/ML EMULSION: Performed by: ANESTHESIOLOGY

## 2021-03-08 PROCEDURE — 45380 COLONOSCOPY AND BIOPSY: CPT | Performed by: INTERNAL MEDICINE

## 2021-03-08 PROCEDURE — 88305 TISSUE EXAM BY PATHOLOGIST: CPT | Performed by: INTERNAL MEDICINE

## 2021-03-08 PROCEDURE — 0DBK8ZZ EXCISION OF ASCENDING COLON, VIA NATURAL OR ARTIFICIAL OPENING ENDOSCOPIC: ICD-10-PCS | Performed by: NURSE PRACTITIONER

## 2021-03-08 PROCEDURE — 45385 COLONOSCOPY W/LESION REMOVAL: CPT | Performed by: INTERNAL MEDICINE

## 2021-03-08 PROCEDURE — 43239 EGD BIOPSY SINGLE/MULTIPLE: CPT | Performed by: INTERNAL MEDICINE

## 2021-03-08 PROCEDURE — 0DB98ZZ EXCISION OF DUODENUM, VIA NATURAL OR ARTIFICIAL OPENING ENDOSCOPIC: ICD-10-PCS | Performed by: NURSE PRACTITIONER

## 2021-03-08 RX ORDER — PROPOFOL 10 MG/ML
VIAL (ML) INTRAVENOUS AS NEEDED
Status: DISCONTINUED | OUTPATIENT
Start: 2021-03-08 | End: 2021-03-08 | Stop reason: SURG

## 2021-03-08 RX ORDER — SODIUM CHLORIDE 0.9 % (FLUSH) 0.9 %
3 SYRINGE (ML) INJECTION EVERY 12 HOURS SCHEDULED
Status: DISCONTINUED | OUTPATIENT
Start: 2021-03-08 | End: 2021-03-08 | Stop reason: HOSPADM

## 2021-03-08 RX ORDER — FEXOFENADINE HCL 180 MG/1
180 TABLET ORAL DAILY
COMMUNITY

## 2021-03-08 RX ORDER — LIDOCAINE HYDROCHLORIDE 20 MG/ML
INJECTION, SOLUTION INFILTRATION; PERINEURAL AS NEEDED
Status: DISCONTINUED | OUTPATIENT
Start: 2021-03-08 | End: 2021-03-08 | Stop reason: SURG

## 2021-03-08 RX ORDER — SODIUM CHLORIDE 0.9 % (FLUSH) 0.9 %
10 SYRINGE (ML) INJECTION AS NEEDED
Status: DISCONTINUED | OUTPATIENT
Start: 2021-03-08 | End: 2021-03-08 | Stop reason: HOSPADM

## 2021-03-08 RX ORDER — SODIUM CHLORIDE, SODIUM LACTATE, POTASSIUM CHLORIDE, CALCIUM CHLORIDE 600; 310; 30; 20 MG/100ML; MG/100ML; MG/100ML; MG/100ML
INJECTION, SOLUTION INTRAVENOUS CONTINUOUS PRN
Status: DISCONTINUED | OUTPATIENT
Start: 2021-03-08 | End: 2021-03-08 | Stop reason: SURG

## 2021-03-08 RX ORDER — FLUTICASONE PROPIONATE 50 MCG
2 SPRAY, SUSPENSION (ML) NASAL DAILY
COMMUNITY

## 2021-03-08 RX ORDER — SODIUM CHLORIDE, SODIUM LACTATE, POTASSIUM CHLORIDE, CALCIUM CHLORIDE 600; 310; 30; 20 MG/100ML; MG/100ML; MG/100ML; MG/100ML
30 INJECTION, SOLUTION INTRAVENOUS CONTINUOUS PRN
Status: DISCONTINUED | OUTPATIENT
Start: 2021-03-08 | End: 2021-03-08 | Stop reason: HOSPADM

## 2021-03-08 RX ADMIN — SODIUM CHLORIDE, POTASSIUM CHLORIDE, SODIUM LACTATE AND CALCIUM CHLORIDE 30 ML/HR: 600; 310; 30; 20 INJECTION, SOLUTION INTRAVENOUS at 10:03

## 2021-03-08 RX ADMIN — PROPOFOL 110 MG: 10 INJECTION, EMULSION INTRAVENOUS at 10:47

## 2021-03-08 RX ADMIN — PROPOFOL 160 MCG/KG/MIN: 10 INJECTION, EMULSION INTRAVENOUS at 10:47

## 2021-03-08 RX ADMIN — GLYCOPYRROLATE 0.2 MG: 0.2 INJECTION, SOLUTION INTRAMUSCULAR; INTRAVITREAL at 10:46

## 2021-03-08 RX ADMIN — SODIUM CHLORIDE, SODIUM LACTATE, POTASSIUM CHLORIDE, AND CALCIUM CHLORIDE: .6; .31; .03; .02 INJECTION, SOLUTION INTRAVENOUS at 10:43

## 2021-03-08 RX ADMIN — LIDOCAINE HYDROCHLORIDE 60 MG: 20 INJECTION, SOLUTION INFILTRATION; PERINEURAL at 10:46

## 2021-03-08 NOTE — H&P
No chief complaint on file.      HPI  Patient here for an EGD due to epigastric pain and colonoscopy due to history of colon polyps    Review of Systems   Gastrointestinal: Negative.         Problem List:    Patient Active Problem List   Diagnosis   • Gastroesophageal reflux disease   • Epigastric pain   • History of Nissen fundoplication   • History of colonic polyps   • Colon cancer screening       Medical History:    Past Medical History:   Diagnosis Date   • Cataract    • GERD (gastroesophageal reflux disease)    • Hyperlipidemia    • Hypertension    • Knee pain    • Sleep apnea     doesn't wear- bothers her with reflux        Social History:    Social History     Socioeconomic History   • Marital status:      Spouse name: Not on file   • Number of children: Not on file   • Years of education: Not on file   • Highest education level: Not on file   Tobacco Use   • Smoking status: Former Smoker   • Smokeless tobacco: Never Used   Substance and Sexual Activity   • Alcohol use: Yes   • Drug use: Never   • Sexual activity: Defer       Family History:   Family History   Problem Relation Age of Onset   • Alzheimer's disease Mother    • Kidney failure Father    • Alzheimer's disease Brother    • Lung cancer Brother    • Stomach cancer Brother    • Hypertension Neg Hx        Surgical History:   Past Surgical History:   Procedure Laterality Date   • COLONOSCOPY     • HEMORROIDECTOMY     • HERNIA REPAIR     • HYSTERECTOMY     • KNEE SURGERY     • TUBAL ABDOMINAL LIGATION     • UPPER GASTROINTESTINAL ENDOSCOPY           Current Facility-Administered Medications:   •  lactated ringers infusion, 30 mL/hr, Intravenous, Continuous PRN, Aminata, Shaye, APRN  •  sodium chloride 0.9 % flush 10 mL, 10 mL, Intravenous, PRN, Aminata, Shaye, APRN  •  sodium chloride 0.9 % flush 3 mL, 3 mL, Intravenous, Q12H, Aminata, Shaye, APRN    Allergies:   Allergies   Allergen Reactions   • Fish Oil Itching   • Rocuronium Anaphylaxis    • Shellfish-Derived Products Nausea And Vomiting        The following portions of the patient's history were reviewed by me and updated as appropriate: review of systems, allergies, current medications, past family history, past medical history, past social history, past surgical history and problem list.    Vitals:    03/08/21 0943   BP: 107/80   Pulse: 69   Resp: 20   Temp: 98 °F (36.7 °C)   SpO2: 99%       PHYSICAL EXAM:    CONSTITUTIONAL:  today's vital signs reviewed by me  GASTROINTESTINAL: abdomen is soft nontender nondistended with normal active bowel sounds, no masses are appreciated    Assessment/ Plan  We will proceed today with EGD and colonoscopy.    Risks and benefits as well as alternatives to endoscopic evaluation were explained to the patient and they voiced understanding and wish to proceed.  These risks include but are not limited to the risk of bleeding, perforation, adverse reaction to sedation, and missed lesions.  The patient was given the opportunity to ask questions prior to the endoscopic procedure.

## 2021-03-08 NOTE — ANESTHESIA POSTPROCEDURE EVALUATION
"Patient: Esme Lemus    Procedure Summary     Date: 03/08/21 Room / Location: [facility]    Anesthesia Start: 1043 Anesthesia Stop: 1129    Procedures:       COLONOSCOPY (Left )      ESOPHAGOGASTRODUODENOSCOPY (Left ) Diagnosis:       Gastroesophageal reflux disease, unspecified whether esophagitis present      Epigastric pain      History of Nissen fundoplication      History of colonic polyps      Colon cancer screening      (Gastroesophageal reflux disease, unspecified whether esophagitis present [K21.9])      (Epigastric pain [R10.13])      (History of Nissen fundoplication [Z98.890])      (History of colonic polyps [Z86.010])      (Colon cancer screening [Z12.11])    Surgeons: Javier Toth MD Provider: Delicia Bob MD    Anesthesia Type: MAC ASA Status: 3          Anesthesia Type: MAC    Vitals  Vitals Value Taken Time   /74 03/08/21 1141   Temp 36.8 °C (98.2 °F) 03/08/21 1124   Pulse 86 03/08/21 1141   Resp 16 03/08/21 1141   SpO2 95 % 03/08/21 1141           Post Anesthesia Care and Evaluation    Patient location during evaluation: bedside  Patient participation: complete - patient participated  Level of consciousness: awake  Pain score: 0  Pain management: adequate  Airway patency: patent  Anesthetic complications: No anesthetic complications  PONV Status: none  Cardiovascular status: acceptable  Respiratory status: acceptable  Hydration status: acceptable    Comments: /74 (BP Location: Left arm, Patient Position: Lying)   Pulse 86   Temp 36.8 °C (98.2 °F) (Infrared)   Resp 16   Ht 167.6 cm (66\")   Wt 94.3 kg (208 lb)   SpO2 95%   BMI 33.57 kg/m²       "

## 2021-03-08 NOTE — ANESTHESIA PREPROCEDURE EVALUATION
Anesthesia Evaluation     Patient summary reviewed and Nursing notes reviewed   no history of anesthetic complications:  NPO Solid Status: > 8 hours  NPO Liquid Status: > 8 hours           Airway   Mallampati: II  TM distance: >3 FB  Neck ROM: full  No difficulty expected  Dental - normal exam     Pulmonary - normal exam   (-) COPD, asthma, shortness of breath, sleep apnea, not a smoker  Cardiovascular - normal exam    Patient on routine beta blocker and Beta blocker given within 24 hours of surgery    (+) hypertension 2 medications or greater, hyperlipidemia,   (-) pacemaker, valvular problems/murmurs, past MI, CAD, dysrhythmias, angina, CHF, [place], cardiac stents, PVD, DVT      Neuro/Psych  (-) seizures, TIA, CVA, weakness, numbness, dementia  GI/Hepatic/Renal/Endo    (+)  GERD,    (-)  obesity, morbid obesity, liver disease, no renal disease, diabetes, no thyroid disorder    Musculoskeletal     (-) back pain, neck pain, neck stiffness, chronic pain  Abdominal  - normal exam   Substance History   (-) alcohol use, drug use     OB/GYN    (-)  Pregnant        Other   arthritis,      (-) blood dyscrasia, history of cancer, autoimmune disease, chronic steroid use                Anesthesia Plan    ASA 3     MAC     intravenous induction     Anesthetic plan, all risks, benefits, and alternatives have been provided, discussed and informed consent has been obtained with: patient.

## 2021-03-09 LAB
CYTO UR: NORMAL
LAB AP CASE REPORT: NORMAL
PATH REPORT.FINAL DX SPEC: NORMAL
PATH REPORT.GROSS SPEC: NORMAL

## 2021-03-19 ENCOUNTER — TELEPHONE (OUTPATIENT)
Dept: GASTROENTEROLOGY | Facility: CLINIC | Age: 71
End: 2021-03-19

## 2021-04-05 NOTE — PROGRESS NOTES
"Chief Complaint   Patient presents with   • Elevated Hepatic Enzymes           History of Present Illness  Patient today for follow-up.  Her colonoscopy showed multiple polyps and she is on schedule for repeat colonoscopy in 3 years.  Her EGD showed a slightly loose Nissen fundoplication but it was not incompetent.  She also has elevated liver enzymes with an elevated anti-smooth muscle antibody as well as an elevated AMA.     Patient presents today for follow up. She has persistently elevated liver enzymes. Her sister has rheumatoid arthritis. Liver enzyme elevation has been present for around 1 year.    She has been experiencing abdominal pain. Pain is present to the epigastric area and has been worsening. She has a history of gallbladder polyp and will be having surveillance ultrasound May 2021.          Result Review :       UPPER GI ENDOSCOPY (03/08/2021 10:45)  COLONOSCOPY (03/08/2021 10:40)  Tissue Pathology Exam (03/08/2021 10:52)  Progress Notes by Shaye Coates APRN (02/05/2021 09:00)  Comprehensive Metabolic Panel (03/05/2021 10:06)      Vital Signs:   /66   Pulse 66   Temp 97.7 °F (36.5 °C) (Temporal)   Resp 16   Ht 167.6 cm (66\")   Wt 96.8 kg (213 lb 4.8 oz)   SpO2 98%   BMI 34.43 kg/m²     Body mass index is 34.43 kg/m².     Physical Exam  Vitals reviewed.   Constitutional:       Appearance: Normal appearance.   Abdominal:      General: Bowel sounds are normal. There is no distension.      Palpations: Abdomen is soft. Abdomen is not rigid. There is no mass or pulsatile mass.      Tenderness: There is no abdominal tenderness. There is no guarding or rebound.           Assessment and Plan    Diagnoses and all orders for this visit:    1. Autoimmune hepatitis (CMS/HCC)  (Primary)    2. Elevated LFTs  -     Hepatic Function Panel    3. Elevated liver enzymes    4. History of Nissen fundoplication    5. History of colon polyps    6. Gastroparesis    7. Epigastric pain  -     NM HIDA Scan " With Pharmacological Intervention; Future    8. Polyp of gallbladder  -     NM HIDA Scan With Pharmacological Intervention; Future         BRIEF SUMMARY  Patient for follow-up.  She is continue to have some epigastric and right upper quadrant pain.  Prior ultrasound showed questionable gallbladder polyp versus stone.  We will check a HIDA scan to check gallbladder function.  She also has elevated transaminases with a positive anti-smooth muscle antibody and antimitochondrial antibody and I do have concern about autoimmune hepatitis versus overlap syndrome.  She does need a liver biopsy to help us sort through this.  This may be done at time of cholecystectomy if her HIDA scan shows gallbladder dysfunction or if her HIDA scan is normal, would set up under regular CT guidance with radiology.  We will go and recheck her transaminases today.    I have reviewed and confirmed the accuracy of the HPI and Assessment and Plan as documented by the APRN CHRISTIAN Estrella        Follow Up   No follow-ups on file.    Patient Instructions   Schedule HIDA scan for further evaluation of symptoms.    Proceed with ultrasound as planned to follow up on gallbladder polyp.    Recheck liver function tests today.    Liver biopsy recommended for further evaluation of elevated liver enzymes with concern for autoimmune hepatitis.    If cholecystectomy is indicated based on HIDA scan and ultrasound, will recommend intraoperative liver biopsy.    If cholecystectomy not indicated, will arrange for CT guided liver biopsy.       Documentation by Oly GONZALES acting as a scribe in the following sections on behalf of the billable provider: HPI, ROS, assessment, & plan.

## 2021-04-08 ENCOUNTER — OFFICE VISIT (OUTPATIENT)
Dept: GASTROENTEROLOGY | Facility: CLINIC | Age: 71
End: 2021-04-08

## 2021-04-08 VITALS
HEART RATE: 66 BPM | TEMPERATURE: 97.7 F | BODY MASS INDEX: 34.28 KG/M2 | OXYGEN SATURATION: 98 % | WEIGHT: 213.3 LBS | RESPIRATION RATE: 16 BRPM | HEIGHT: 66 IN | SYSTOLIC BLOOD PRESSURE: 134 MMHG | DIASTOLIC BLOOD PRESSURE: 66 MMHG

## 2021-04-08 DIAGNOSIS — R10.13 EPIGASTRIC PAIN: ICD-10-CM

## 2021-04-08 DIAGNOSIS — K75.4 AUTOIMMUNE HEPATITIS (HCC): Primary | ICD-10-CM

## 2021-04-08 DIAGNOSIS — K31.84 GASTROPARESIS: ICD-10-CM

## 2021-04-08 DIAGNOSIS — R74.8 ELEVATED LIVER ENZYMES: ICD-10-CM

## 2021-04-08 DIAGNOSIS — Z86.010 HISTORY OF COLON POLYPS: ICD-10-CM

## 2021-04-08 DIAGNOSIS — K82.4 POLYP OF GALLBLADDER: ICD-10-CM

## 2021-04-08 DIAGNOSIS — Z98.890 HISTORY OF NISSEN FUNDOPLICATION: ICD-10-CM

## 2021-04-08 DIAGNOSIS — R79.89 ELEVATED LFTS: ICD-10-CM

## 2021-04-08 PROCEDURE — 99214 OFFICE O/P EST MOD 30 MIN: CPT | Performed by: INTERNAL MEDICINE

## 2021-04-08 NOTE — PATIENT INSTRUCTIONS
Schedule HIDA scan for further evaluation of symptoms.    Proceed with ultrasound as planned to follow up on gallbladder polyp.    Recheck liver function tests today.    Liver biopsy recommended for further evaluation of elevated liver enzymes with concern for autoimmune hepatitis.    If cholecystectomy is indicated based on HIDA scan and ultrasound, will recommend intraoperative liver biopsy.    If cholecystectomy not indicated, will arrange for CT guided liver biopsy.    For surveillance of colon polyps, colonoscopy recommended at 3-year interval, due March 2021.

## 2021-04-09 LAB
ALBUMIN SERPL-MCNC: 4.2 G/DL (ref 3.5–5.2)
ALP SERPL-CCNC: 115 U/L (ref 39–117)
ALT SERPL-CCNC: 177 U/L (ref 1–33)
AST SERPL-CCNC: 216 U/L (ref 1–32)
BILIRUB DIRECT SERPL-MCNC: 0.3 MG/DL (ref 0–0.3)
BILIRUB SERPL-MCNC: 1 MG/DL (ref 0–1.2)
PROT SERPL-MCNC: 7.5 G/DL (ref 6–8.5)

## 2021-04-13 ENCOUNTER — TELEPHONE (OUTPATIENT)
Dept: GASTROENTEROLOGY | Facility: CLINIC | Age: 71
End: 2021-04-13

## 2021-04-13 NOTE — TELEPHONE ENCOUNTER
Dr. Toth did want the HIDA scan done first because if it indicates need for cholecystectomy, she will not need the updated ultrasound.  Please get the HIDA scan scheduled first.  Let me know if she has any further questions.

## 2021-04-13 NOTE — TELEPHONE ENCOUNTER
Patient called and was asking about the scheduling of HIDA scan. She seemed confused. I tried to explain that she should have the abd US done first and then the HIDA. Patient states this is not what Dr Toth told her. She would like to discuss this with you. I did provide her with centralized scheduling's phone number so that she can try to get US done sooner and get HIDA scheduled.    Patient has dentist appointment at 9am and will be unavailable for and hour or two.

## 2021-04-16 ENCOUNTER — HOSPITAL ENCOUNTER (OUTPATIENT)
Dept: NUCLEAR MEDICINE | Facility: HOSPITAL | Age: 71
Discharge: HOME | End: 2021-04-16

## 2021-04-16 DIAGNOSIS — K82.4 POLYP OF GALLBLADDER: ICD-10-CM

## 2021-04-16 DIAGNOSIS — R10.13 EPIGASTRIC PAIN: ICD-10-CM

## 2021-04-16 PROCEDURE — A9537 TC99M MEBROFENIN: HCPCS | Performed by: INTERNAL MEDICINE

## 2021-04-16 PROCEDURE — 78226 HEPATOBILIARY SYSTEM IMAGING: CPT

## 2021-04-16 PROCEDURE — 0 TECHNETIUM TC 99M MEBROFENIN KIT: Performed by: INTERNAL MEDICINE

## 2021-04-16 RX ORDER — KIT FOR THE PREPARATION OF TECHNETIUM TC 99M MEBROFENIN 45 MG/10ML
1 INJECTION, POWDER, LYOPHILIZED, FOR SOLUTION INTRAVENOUS
Status: COMPLETED | OUTPATIENT
Start: 2021-04-16 | End: 2021-04-16

## 2021-04-16 RX ADMIN — MEBROFENIN 1 DOSE: 45 INJECTION, POWDER, LYOPHILIZED, FOR SOLUTION INTRAVENOUS at 08:21

## 2021-04-21 RX ORDER — DICYCLOMINE HYDROCHLORIDE 10 MG/1
10 CAPSULE ORAL 3 TIMES DAILY PRN
Qty: 90 CAPSULE | Refills: 5 | Status: SHIPPED | OUTPATIENT
Start: 2021-04-21 | End: 2021-05-21

## 2021-04-21 NOTE — TELEPHONE ENCOUNTER
Patient would like to try Dicyclomine at [facility]. She will keep US gallbladder, she does not want this bumped up at the moment. Will try medication and let us know how she is doing. Medication pended for approval.

## 2021-04-21 NOTE — TELEPHONE ENCOUNTER
Patient called and states in her Stomach she is hurting. States the pain feels like it goes into her intestines. States she has worse pain after she eats meat.  Last about 10-15, then she will take Gas-X and also Ibuprofen. This has been on and off for the last two weeks. She would like to know what else she can try for the abdominal pain and discomfort. She would also like to have CT guided liver biopsy set up per Dr. Toth on results of Hepatic Function panel. Please advise.

## 2021-04-21 NOTE — TELEPHONE ENCOUNTER
We can send in dicyclomine 10 mg 3 times daily as needed for abdominal pain.  Prior to scheduling the liver biopsy, she needs the updated gallbladder ultrasound because if based off of this she needs cholecystectomy, liver biopsy can be performed at time of surgery.  Please see if they can move up her ultrasound.

## 2021-05-14 ENCOUNTER — HOSPITAL ENCOUNTER (OUTPATIENT)
Dept: ULTRASOUND IMAGING | Facility: HOSPITAL | Age: 71
Discharge: HOME | End: 2021-05-14
Admitting: NURSE PRACTITIONER

## 2021-05-14 DIAGNOSIS — K82.4 POLYP OF GALLBLADDER: ICD-10-CM

## 2021-05-14 PROCEDURE — 76705 ECHO EXAM OF ABDOMEN: CPT

## 2021-06-17 ENCOUNTER — TELEPHONE (OUTPATIENT)
Dept: GASTROENTEROLOGY | Facility: CLINIC | Age: 71
End: 2021-06-17

## 2021-06-17 DIAGNOSIS — R79.89 ELEVATED LFTS: Primary | ICD-10-CM

## 2021-06-21 ENCOUNTER — TRANSCRIBE ORDERS (OUTPATIENT)
Dept: ADMINISTRATIVE | Facility: HOSPITAL | Age: 71
End: 2021-06-21

## 2021-06-21 DIAGNOSIS — Z01.812 ENCOUNTER FOR PREPROCEDURE SCREENING LABORATORY TESTING FOR COVID-19: Primary | ICD-10-CM

## 2021-06-21 DIAGNOSIS — Z20.822 ENCOUNTER FOR PREPROCEDURE SCREENING LABORATORY TESTING FOR COVID-19: Primary | ICD-10-CM

## 2021-06-29 ENCOUNTER — HOSPITAL ENCOUNTER (OUTPATIENT)
Dept: CT IMAGING | Facility: HOSPITAL | Age: 71
Discharge: HOME | End: 2021-06-29
Admitting: NURSE PRACTITIONER

## 2021-06-29 VITALS
SYSTOLIC BLOOD PRESSURE: 114 MMHG | WEIGHT: 203 LBS | HEIGHT: 66 IN | BODY MASS INDEX: 32.62 KG/M2 | OXYGEN SATURATION: 90 % | DIASTOLIC BLOOD PRESSURE: 59 MMHG | RESPIRATION RATE: 16 BRPM | HEART RATE: 61 BPM

## 2021-06-29 DIAGNOSIS — R79.89 ELEVATED LFTS: ICD-10-CM

## 2021-06-29 LAB
BASOPHILS # BLD AUTO: 0.03 10*3/MM3 (ref 0–0.2)
BASOPHILS NFR BLD AUTO: 0.5 % (ref 0–1.5)
DEPRECATED RDW RBC AUTO: 48.5 FL (ref 37–54)
EOSINOPHIL # BLD AUTO: 0.09 10*3/MM3 (ref 0–0.4)
EOSINOPHIL NFR BLD AUTO: 1.6 % (ref 0.3–6.2)
ERYTHROCYTE [DISTWIDTH] IN BLOOD BY AUTOMATED COUNT: 13.8 % (ref 12.3–15.4)
HCT VFR BLD AUTO: 47.5 % (ref 34–46.6)
HGB BLD-MCNC: 15.4 G/DL (ref 12–15.9)
IMM GRANULOCYTES # BLD AUTO: 0.01 10*3/MM3 (ref 0–0.05)
IMM GRANULOCYTES NFR BLD AUTO: 0.2 % (ref 0–0.5)
INR PPP: 1 (ref 0.8–1.2)
LYMPHOCYTES # BLD AUTO: 1.75 10*3/MM3 (ref 0.7–3.1)
LYMPHOCYTES NFR BLD AUTO: 31 % (ref 19.6–45.3)
MCH RBC QN AUTO: 31 PG (ref 26.6–33)
MCHC RBC AUTO-ENTMCNC: 32.4 G/DL (ref 31.5–35.7)
MCV RBC AUTO: 95.8 FL (ref 79–97)
MONOCYTES # BLD AUTO: 0.72 10*3/MM3 (ref 0.1–0.9)
MONOCYTES NFR BLD AUTO: 12.8 % (ref 5–12)
NEUTROPHILS NFR BLD AUTO: 3.04 10*3/MM3 (ref 1.7–7)
NEUTROPHILS NFR BLD AUTO: 53.9 % (ref 42.7–76)
NRBC BLD AUTO-RTO: 0.4 /100 WBC (ref 0–0.2)
PLATELET # BLD AUTO: 162 10*3/MM3 (ref 140–450)
PLATELET # BLD AUTO: 162 10*3/MM3 (ref 140–450)
PMV BLD AUTO: 10.1 FL (ref 6–12)
PROTHROMBIN TIME: 12 SECONDS (ref 12.8–15.2)
RBC # BLD AUTO: 4.96 10*6/MM3 (ref 3.77–5.28)
WBC # BLD AUTO: 5.64 10*3/MM3 (ref 3.4–10.8)

## 2021-06-29 PROCEDURE — 25010000002 FENTANYL CITRATE (PF) 50 MCG/ML SOLUTION: Performed by: RADIOLOGY

## 2021-06-29 PROCEDURE — 85610 PROTHROMBIN TIME: CPT

## 2021-06-29 PROCEDURE — 88313 SPECIAL STAINS GROUP 2: CPT | Performed by: NURSE PRACTITIONER

## 2021-06-29 PROCEDURE — 85025 COMPLETE CBC W/AUTO DIFF WBC: CPT | Performed by: RADIOLOGY

## 2021-06-29 PROCEDURE — 88307 TISSUE EXAM BY PATHOLOGIST: CPT | Performed by: NURSE PRACTITIONER

## 2021-06-29 PROCEDURE — 77012 CT SCAN FOR NEEDLE BIOPSY: CPT

## 2021-06-29 PROCEDURE — 25010000002 MIDAZOLAM PER 1 MG: Performed by: RADIOLOGY

## 2021-06-29 PROCEDURE — 85049 AUTOMATED PLATELET COUNT: CPT | Performed by: RADIOLOGY

## 2021-06-29 PROCEDURE — 25010000003 LIDOCAINE 1 % SOLUTION: Performed by: RADIOLOGY

## 2021-06-29 RX ORDER — LIDOCAINE HYDROCHLORIDE 10 MG/ML
20 INJECTION, SOLUTION INFILTRATION; PERINEURAL ONCE
Status: COMPLETED | OUTPATIENT
Start: 2021-06-29 | End: 2021-06-29

## 2021-06-29 RX ORDER — FENTANYL CITRATE 50 UG/ML
INJECTION, SOLUTION INTRAMUSCULAR; INTRAVENOUS
Status: COMPLETED | OUTPATIENT
Start: 2021-06-29 | End: 2021-06-29

## 2021-06-29 RX ORDER — SODIUM CHLORIDE 9 MG/ML
25 INJECTION, SOLUTION INTRAVENOUS ONCE
Status: COMPLETED | OUTPATIENT
Start: 2021-06-29 | End: 2021-06-29

## 2021-06-29 RX ORDER — SODIUM CHLORIDE 0.9 % (FLUSH) 0.9 %
10 SYRINGE (ML) INJECTION AS NEEDED
Status: DISCONTINUED | OUTPATIENT
Start: 2021-06-29 | End: 2021-06-30 | Stop reason: HOSPADM

## 2021-06-29 RX ORDER — SODIUM CHLORIDE 0.9 % (FLUSH) 0.9 %
3 SYRINGE (ML) INJECTION EVERY 12 HOURS SCHEDULED
Status: DISCONTINUED | OUTPATIENT
Start: 2021-06-29 | End: 2021-06-30 | Stop reason: HOSPADM

## 2021-06-29 RX ORDER — MIDAZOLAM HYDROCHLORIDE 1 MG/ML
INJECTION INTRAMUSCULAR; INTRAVENOUS
Status: COMPLETED | OUTPATIENT
Start: 2021-06-29 | End: 2021-06-29

## 2021-06-29 RX ADMIN — SODIUM CHLORIDE 25 ML/HR: 9 INJECTION, SOLUTION INTRAVENOUS at 10:09

## 2021-06-29 RX ADMIN — LIDOCAINE HYDROCHLORIDE 20 ML: 10 INJECTION, SOLUTION INFILTRATION; PERINEURAL at 10:24

## 2021-06-29 RX ADMIN — MIDAZOLAM 1 MG: 1 INJECTION INTRAMUSCULAR; INTRAVENOUS at 10:24

## 2021-06-29 RX ADMIN — FENTANYL CITRATE 25 MCG: 50 INJECTION INTRAMUSCULAR; INTRAVENOUS at 10:24

## 2021-06-30 ENCOUNTER — TELEPHONE (OUTPATIENT)
Dept: INTERVENTIONAL RADIOLOGY/VASCULAR | Facility: HOSPITAL | Age: 71
End: 2021-06-30

## 2021-06-30 LAB
CYTO UR: NORMAL
LAB AP CASE REPORT: NORMAL
LAB AP CLINICAL INFORMATION: NORMAL
LAB AP DIAGNOSIS COMMENT: NORMAL
PATH REPORT.FINAL DX SPEC: NORMAL
PATH REPORT.GROSS SPEC: NORMAL

## 2021-07-01 DIAGNOSIS — K75.4 AUTOIMMUNE HEPATITIS (HCC): Primary | ICD-10-CM

## 2021-07-01 RX ORDER — BUDESONIDE 3 MG/1
9 CAPSULE, COATED PELLETS ORAL EVERY MORNING
Qty: 270 CAPSULE | Refills: 0 | Status: SHIPPED | OUTPATIENT
Start: 2021-07-01 | End: 2021-07-20

## 2021-07-08 ENCOUNTER — OFFICE VISIT (OUTPATIENT)
Dept: GASTROENTEROLOGY | Facility: CLINIC | Age: 71
End: 2021-07-08

## 2021-07-08 VITALS
WEIGHT: 205.6 LBS | HEART RATE: 81 BPM | SYSTOLIC BLOOD PRESSURE: 144 MMHG | RESPIRATION RATE: 16 BRPM | TEMPERATURE: 97.4 F | BODY MASS INDEX: 33.04 KG/M2 | OXYGEN SATURATION: 97 % | HEIGHT: 66 IN | DIASTOLIC BLOOD PRESSURE: 90 MMHG

## 2021-07-08 DIAGNOSIS — K75.4 AUTOIMMUNE HEPATITIS (HCC): Primary | ICD-10-CM

## 2021-07-08 DIAGNOSIS — R79.89 ELEVATED LFTS: ICD-10-CM

## 2021-07-08 PROBLEM — Z23 NEED FOR INFLUENZA VACCINATION: Status: ACTIVE | Noted: 2019-10-23

## 2021-07-08 PROBLEM — M48.07 LUMBOSACRAL STENOSIS: Status: ACTIVE | Noted: 2017-11-14

## 2021-07-08 PROBLEM — R51.9 HEADACHE: Status: ACTIVE | Noted: 2020-05-27

## 2021-07-08 PROBLEM — R76.8 ELEVATED ANTINUCLEAR ANTIBODY (ANA) LEVEL: Status: ACTIVE | Noted: 2019-01-22

## 2021-07-08 PROBLEM — M17.9 OSTEOARTHRITIS OF KNEE: Status: ACTIVE | Noted: 2017-05-02

## 2021-07-08 PROBLEM — M19.90 ARTHRITIS: Status: ACTIVE | Noted: 2019-01-18

## 2021-07-08 PROBLEM — R19.7 DIARRHEA: Status: ACTIVE | Noted: 2018-11-01

## 2021-07-08 PROBLEM — R76.9 ABNORMAL IMMUNOLOGICAL FINDING IN SERUM, UNSPECIFIED: Status: ACTIVE | Noted: 2019-01-22

## 2021-07-08 PROBLEM — R68.81 EARLY SATIETY: Status: ACTIVE | Noted: 2018-11-01

## 2021-07-08 PROBLEM — K44.9 HIATAL HERNIA: Status: ACTIVE | Noted: 2018-10-09

## 2021-07-08 PROBLEM — E53.8 COBALAMIN DEFICIENCY: Status: ACTIVE | Noted: 2018-06-15

## 2021-07-08 PROBLEM — K76.0 FATTY LIVER: Status: ACTIVE | Noted: 2020-09-11

## 2021-07-08 PROBLEM — M79.10 MYALGIA: Status: ACTIVE | Noted: 2019-01-18

## 2021-07-08 PROBLEM — K30 DELAYED GASTRIC EMPTYING: Status: ACTIVE | Noted: 2018-12-14

## 2021-07-08 PROBLEM — K20.90 ESOPHAGITIS: Status: ACTIVE | Noted: 2018-12-14

## 2021-07-08 PROBLEM — E66.9 OBESITY WITH BODY MASS INDEX 30 OR GREATER: Status: ACTIVE | Noted: 2017-08-28

## 2021-07-08 PROBLEM — M85.80 OSTEOPENIA: Status: ACTIVE | Noted: 2017-08-28

## 2021-07-08 PROBLEM — R10.11 RIGHT UPPER QUADRANT PAIN: Status: ACTIVE | Noted: 2019-11-14

## 2021-07-08 PROBLEM — K63.5 POLYP OF COLON: Status: ACTIVE | Noted: 2018-12-14

## 2021-07-08 PROBLEM — R74.8 ELEVATED LIVER ENZYMES: Status: ACTIVE | Noted: 2018-06-15

## 2021-07-08 PROBLEM — R60.9 EDEMA: Status: ACTIVE | Noted: 2019-08-12

## 2021-07-08 PROBLEM — M54.50 LOW BACK PAIN: Status: ACTIVE | Noted: 2017-04-03

## 2021-07-08 PROBLEM — K76.9 LESION OF LIVER: Status: ACTIVE | Noted: 2019-12-17

## 2021-07-08 PROBLEM — K25.9 GASTRIC ULCER: Status: ACTIVE | Noted: 2017-05-01

## 2021-07-08 PROBLEM — R06.02 SHORTNESS OF BREATH: Status: ACTIVE | Noted: 2019-08-12

## 2021-07-08 PROBLEM — M54.9 BACKACHE: Status: ACTIVE | Noted: 2018-04-16

## 2021-07-08 PROBLEM — Z78.0 MENOPAUSE: Status: ACTIVE | Noted: 2017-08-29

## 2021-07-08 PROBLEM — B02.9 HERPES ZOSTER: Status: ACTIVE | Noted: 2019-09-20

## 2021-07-08 PROBLEM — I51.89 DIASTOLIC DYSFUNCTION: Status: ACTIVE | Noted: 2019-08-19

## 2021-07-08 PROBLEM — M54.31 SCIATICA OF RIGHT SIDE: Status: ACTIVE | Noted: 2017-12-04

## 2021-07-08 PROCEDURE — 99214 OFFICE O/P EST MOD 30 MIN: CPT | Performed by: NURSE PRACTITIONER

## 2021-07-08 NOTE — PATIENT INSTRUCTIONS
For autoimmune hepatitis, start taking budesonide as prescribed.    Come to the lab in 4 weeks and we will recheck liver enzymes and also check a TPMT activity.    Depending on response to budesonide and results of lab work, we may consider treatment with azathioprine and prednisone in the future.    Further recommendations will be made after lab work in 4 weeks.

## 2021-07-08 NOTE — PROGRESS NOTES
"Chief Complaint   Patient presents with   • Post Biopsy Visit     Liver Biopsy           History of Present Illness  Patient is a 71-year-old female who presents today for follow-up.    She was last seen in the office April 2021.  She underwent work-up for elevated liver enzymes with positive smooth muscle antibody and elevated AMA.  She underwent HIDA scan which was normal and a ultrasound of her gallbladder which showed a stable polyp but was otherwise unremarkable from a GI standpoint.  She then underwent liver biopsy which showed mild inflammation felt to be consistent with autoimmune hepatitis.  She presents today for follow-up after testing and to discuss results and treatment plan.  She had been experiencing abdominal pain that she reports has resolved at this time.    Most recent transaminases were checked 6/26/2021 with ALT 78 and AST of 96.  This had improved from April 2021 and AST was 269 ALT was 177.       Result Review :       Tissue Pathology Exam (06/29/2021 10:30)   CT Needle Biopsy Liver (06/29/2021 10:33)   US Gallbladder (05/14/2021 10:01)   NM Hepatobiliary Without CCK (04/16/2021 10:36)   Office Visit with Javier Toth MD (04/08/2021)       Vital Signs:   /90   Pulse 81   Temp 97.4 °F (36.3 °C) (Temporal)   Resp 16   Ht 167.6 cm (66\")   Wt 93.3 kg (205 lb 9.6 oz)   SpO2 97%   BMI 33.18 kg/m²     Body mass index is 33.18 kg/m².     Physical Exam  Vitals reviewed.   Constitutional:       General: She is not in acute distress.     Appearance: She is well-developed.   HENT:      Head: Normocephalic and atraumatic.   Pulmonary:      Effort: Pulmonary effort is normal. No respiratory distress.   Skin:     General: Skin is dry.      Coloration: Skin is not pale.   Neurological:      Mental Status: She is alert and oriented to person, place, and time.   Psychiatric:         Thought Content: Thought content normal.           Assessment and Plan    Diagnoses and all orders for this " visit:    1. Autoimmune hepatitis (CMS/HCC) (Primary)  -     Hepatic Function Panel; Future  -     TPMT Enzyme; Future    2. Elevated LFTs  -     Hepatic Function Panel; Future  -     TPMT Enzyme; Future         Discussion  Patient presents today for follow-up after liver biopsy.  Biopsy was consistent with autoimmune hepatitis.  New diagnosis of autoimmune hepatitis was discussed today including treatment plan and risk of fibrosis/cirrhosis if inflammation goes untreated.  Will initiate treatment with budesonide and recheck hepatic function panel in 4 weeks for monitoring.  If LFTs have improved, we will continue treatment.  If LFTs have worsened, we will also be obtaining TPMT enzyme activity to evaluate patient for possible treatment with azathioprine and prednisone.          Follow Up   Return for Follow up to review results after testing complete.    Patient Instructions   For autoimmune hepatitis, start taking budesonide as prescribed.    Come to the lab in 4 weeks and we will recheck liver enzymes and also check a TPMT activity.    Depending on response to budesonide and results of lab work, we may consider treatment with azathioprine and prednisone in the future.    Further recommendations will be made after lab work in 4 weeks.

## 2021-07-09 ENCOUNTER — TELEPHONE (OUTPATIENT)
Dept: GASTROENTEROLOGY | Facility: CLINIC | Age: 71
End: 2021-07-09

## 2021-07-09 NOTE — TELEPHONE ENCOUNTER
Please have patient come to lab to have TMPT testing performed to determine if it is OK for us to use azathioprine. Order is in system.

## 2021-07-12 DIAGNOSIS — R79.89 ELEVATED LFTS: ICD-10-CM

## 2021-07-12 DIAGNOSIS — K75.4 AUTOIMMUNE HEPATITIS (HCC): ICD-10-CM

## 2021-07-13 ENCOUNTER — LAB (OUTPATIENT)
Dept: LAB | Facility: HOSPITAL | Age: 71
End: 2021-07-13

## 2021-07-13 LAB
ALBUMIN SERPL-MCNC: 3.8 G/DL (ref 3.5–5.2)
ALP SERPL-CCNC: 82 U/L (ref 39–117)
ALT SERPL W P-5'-P-CCNC: 67 U/L (ref 1–33)
AST SERPL-CCNC: 80 U/L (ref 1–32)
BILIRUB CONJ SERPL-MCNC: 0.3 MG/DL (ref 0–0.3)
BILIRUB INDIRECT SERPL-MCNC: 0.7 MG/DL
BILIRUB SERPL-MCNC: 1 MG/DL (ref 0–1.2)
PROT SERPL-MCNC: 7.1 G/DL (ref 6–8.5)

## 2021-07-13 PROCEDURE — 80076 HEPATIC FUNCTION PANEL: CPT | Performed by: NURSE PRACTITIONER

## 2021-07-13 PROCEDURE — 82657 ENZYME CELL ACTIVITY: CPT | Performed by: NURSE PRACTITIONER

## 2021-07-13 PROCEDURE — 36415 COLL VENOUS BLD VENIPUNCTURE: CPT

## 2021-07-13 PROCEDURE — 82542 COL CHROMOTOGRAPHY QUAL/QUAN: CPT | Performed by: NURSE PRACTITIONER

## 2021-07-20 LAB — REF LAB TEST METHOD: NORMAL

## 2021-07-20 RX ORDER — PREDNISONE 10 MG/1
TABLET ORAL
Qty: 84 TABLET | Refills: 0 | Status: SHIPPED | OUTPATIENT
Start: 2021-07-20 | End: 2022-02-17

## 2021-07-23 DIAGNOSIS — R79.89 ELEVATED LFTS: Primary | ICD-10-CM

## 2021-08-03 RX ORDER — PANTOPRAZOLE SODIUM 40 MG/1
TABLET, DELAYED RELEASE ORAL
Qty: 90 TABLET | Refills: 3 | Status: SHIPPED | OUTPATIENT
Start: 2021-08-03 | End: 2022-07-28

## 2021-08-05 NOTE — PROGRESS NOTES
"Chief Complaint   Patient presents with   • Autoimmune Hepatitis         History of Present Illness  Patient is a 71-year-old female who presents today for follow-up.    She was recently diagnosed with autoimmune hepatitis.  Plan has been to initiate treatment with budesonide however her insurance would not cover these affordably.  She was started on prednisone and TPMT enzyme activity was obtained showing normal activity.  She presents today to discuss treatment.    Patient presents today for follow-up.  She has started prednisone and LFTs have improved somewhat but remain elevated.    She reports she also has been experiencing episodes of abdominal pain.  Pain be present to the epigastric area and radiate downward.  These come and go.  She has had around a 10 pound weight loss over the last few months that is unintentional.       Result Review :       Office Visit with Oly Talavera APRN (07/08/2021)   Tissue Pathology Exam (06/29/2021 10:30)   CT Needle Biopsy Liver (06/29/2021 10:33)   US Gallbladder (05/14/2021 10:01)   NM Hepatobiliary Without CCK (04/16/2021 10:36)   Office Visit with Javier Ttoh MD (04/08/2021)       Vital Signs:   /80   Pulse 58   Temp 97.1 °F (36.2 °C) (Temporal)   Resp 16   Ht 167.6 cm (66\")   Wt 91.8 kg (202 lb 6.4 oz)   SpO2 98%   BMI 32.67 kg/m²     Body mass index is 32.67 kg/m².     Physical Exam  Vitals reviewed.   Constitutional:       General: She is not in acute distress.     Appearance: She is well-developed.   HENT:      Head: Normocephalic and atraumatic.   Pulmonary:      Effort: Pulmonary effort is normal. No respiratory distress.   Abdominal:      General: Abdomen is flat. Bowel sounds are normal. There is no distension.      Palpations: Abdomen is soft.      Tenderness: There is no abdominal tenderness.   Skin:     General: Skin is dry.      Coloration: Skin is not pale.   Neurological:      Mental Status: She is alert and oriented to person, " 700 On license of [facility] REVIEWED WITH PT VERBALIZED UNDERSTANDING. Pt rights and responsibilities offered to pt to read. _MET___ Safety:       (Environmental)   [place] to environment   Ensure ID band is correct and in place/ allergy band as needed   Assess for fall risk   Initiate fall precautions as applicable (fall band, side rails, etc.)   Call light within reach   Bed in low position/ wheels locked    __MET__ Pain:        Assess pain level and characteristics   Administer analgesics as ordered   Assess effectiveness of pain management and report to MD as needed    _MET___ Knowledge Deficit:   Assess baseline knowledge   Provide teaching at level of understanding   Provide teaching via preferred learning method   Evaluate teaching effectiveness    __MET__ Hemodynamic/Respiratory Status:      Assess vital signs/ LOC until patient meets discharge criteria   Monitor procedure site and notify MD of any issues    [facility] TO PT VERBALIZED UNDERSTANDING. [address] [facility] . place, and time.   Psychiatric:         Thought Content: Thought content normal.           Assessment and Plan    Diagnoses and all orders for this visit:    1. Epigastric pain (Primary)  -     CT Abdomen Pelvis With Contrast; Future    2. Weight loss  -     CT Abdomen Pelvis With Contrast; Future    3. Periumbilical abdominal pain  -     CT Abdomen Pelvis With Contrast; Future    4. Autoimmune hepatitis (CMS/HCC)  -     CBC & Differential; Future  -     Hepatic Function Panel; Future    5. High risk medication use  -     CBC & Differential; Future  -     Hepatic Function Panel; Future    Other orders  -     azaTHIOprine (IMURAN) 50 MG tablet; Take 1 tablet by mouth Daily.  Dispense: 90 tablet; Refill: 0  -     predniSONE (DELTASONE) 5 MG tablet; Take 1 tablet by mouth Daily.  Dispense: 90 tablet; Refill: 0         Discussion  Patient presents today for follow-up of autoimmune hepatitis.  We discussed plan to initiate treatment with azathioprine.  Risks and benefits of treatment were discussed including risk for myelosuppression, liver injury, pancreatitis, increased risk of infection and increased risk of malignancy including lymphoma.  Discussed risk of immunosuppression in light of the ongoing COVID-19 pandemic.  Discussed need for close lab monitoring.  We will plan on labs weekly for 1 month after initiation of therapy.    Regarding abdominal pain and weight loss, will schedule CT abdomen pelvis for further evaluation.          Follow Up   No follow-ups on file.    Patient Instructions   Schedule CT abdomen/pelvis for further evaluation of symptoms.    For autoimmune hepatitis, continue prednisone taper as prescribed. Once you complete taper, take prednisone 5 mg once daily.    For autoimmune hepatitis, start azathioprine 50 mg daily. Prescription sent to pharmacy.    One week after you start azathioprine, we will check updated labs for monitoring. Will check CBC and CMP.    Due to immunosuppressive medication  and the ongoing COVID-19 pandemic, recommend following CDC guidelines regarding mask use, good handwashing, frequent sanitizing of frequently touched surfaces, and social distancing.

## 2021-08-06 ENCOUNTER — LAB (OUTPATIENT)
Dept: LAB | Facility: HOSPITAL | Age: 71
End: 2021-08-06

## 2021-08-06 PROCEDURE — 80076 HEPATIC FUNCTION PANEL: CPT | Performed by: NURSE PRACTITIONER

## 2021-08-12 ENCOUNTER — OFFICE VISIT (OUTPATIENT)
Dept: GASTROENTEROLOGY | Facility: CLINIC | Age: 71
End: 2021-08-12

## 2021-08-12 VITALS
RESPIRATION RATE: 16 BRPM | HEIGHT: 66 IN | DIASTOLIC BLOOD PRESSURE: 80 MMHG | WEIGHT: 202.4 LBS | HEART RATE: 58 BPM | BODY MASS INDEX: 32.53 KG/M2 | SYSTOLIC BLOOD PRESSURE: 136 MMHG | OXYGEN SATURATION: 98 % | TEMPERATURE: 97.1 F

## 2021-08-12 DIAGNOSIS — Z79.899 HIGH RISK MEDICATION USE: ICD-10-CM

## 2021-08-12 DIAGNOSIS — R10.33 PERIUMBILICAL ABDOMINAL PAIN: ICD-10-CM

## 2021-08-12 DIAGNOSIS — R63.4 WEIGHT LOSS: ICD-10-CM

## 2021-08-12 DIAGNOSIS — R10.13 EPIGASTRIC PAIN: Primary | ICD-10-CM

## 2021-08-12 DIAGNOSIS — K75.4 AUTOIMMUNE HEPATITIS (HCC): ICD-10-CM

## 2021-08-12 PROCEDURE — 99214 OFFICE O/P EST MOD 30 MIN: CPT | Performed by: NURSE PRACTITIONER

## 2021-08-12 RX ORDER — AZATHIOPRINE 50 MG/1
50 TABLET ORAL DAILY
Qty: 90 TABLET | Refills: 0 | Status: SHIPPED | OUTPATIENT
Start: 2021-08-12 | End: 2021-10-28

## 2021-08-12 RX ORDER — PREDNISONE 1 MG/1
5 TABLET ORAL DAILY
Qty: 90 TABLET | Refills: 0 | Status: SHIPPED | OUTPATIENT
Start: 2021-08-12 | End: 2021-12-29

## 2021-08-12 NOTE — PATIENT INSTRUCTIONS
Schedule CT abdomen/pelvis for further evaluation of symptoms.    For autoimmune hepatitis, continue prednisone taper as prescribed. Once you complete taper, take prednisone 5 mg once daily.    For autoimmune hepatitis, start azathioprine 50 mg daily. Prescription sent to pharmacy.    One week after you start azathioprine, we will check updated labs for monitoring. Will check CBC and CMP.    Due to immunosuppressive medication and the ongoing COVID-19 pandemic, recommend following CDC guidelines regarding mask use, good handwashing, frequent sanitizing of frequently touched surfaces, and social distancing.

## 2021-08-13 RX ORDER — PREDNISONE 10 MG/1
TABLET ORAL
Qty: 84 TABLET | Refills: 0 | OUTPATIENT
Start: 2021-08-13

## 2021-09-03 ENCOUNTER — HOSPITAL ENCOUNTER (OUTPATIENT)
Dept: CT IMAGING | Facility: HOSPITAL | Age: 71
Discharge: HOME | End: 2021-09-03
Admitting: NURSE PRACTITIONER

## 2021-09-03 DIAGNOSIS — R10.13 EPIGASTRIC PAIN: ICD-10-CM

## 2021-09-03 DIAGNOSIS — R10.33 PERIUMBILICAL ABDOMINAL PAIN: ICD-10-CM

## 2021-09-03 DIAGNOSIS — R63.4 WEIGHT LOSS: ICD-10-CM

## 2021-09-03 LAB — CREAT BLDA-MCNC: 1 MG/DL (ref 0.6–1.3)

## 2021-09-03 PROCEDURE — 0 DIATRIZOATE MEGLUMINE & SODIUM PER 1 ML: Performed by: NURSE PRACTITIONER

## 2021-09-03 PROCEDURE — 74177 CT ABD & PELVIS W/CONTRAST: CPT

## 2021-09-03 PROCEDURE — 25010000002 IOPAMIDOL 61 % SOLUTION: Performed by: NURSE PRACTITIONER

## 2021-09-03 PROCEDURE — 82565 ASSAY OF CREATININE: CPT

## 2021-09-03 RX ADMIN — DIATRIZOATE MEGLUMINE AND DIATRIZOATE SODIUM 30 ML: 600; 100 SOLUTION ORAL; RECTAL at 06:50

## 2021-09-03 RX ADMIN — IOPAMIDOL 85 ML: 612 INJECTION, SOLUTION INTRAVENOUS at 08:37

## 2021-09-07 DIAGNOSIS — R93.2 ABNORMAL CT OF LIVER: ICD-10-CM

## 2021-09-07 DIAGNOSIS — K75.4 AUTOIMMUNE HEPATITIS (HCC): Primary | ICD-10-CM

## 2021-09-23 ENCOUNTER — TELEPHONE (OUTPATIENT)
Dept: GASTROENTEROLOGY | Facility: CLINIC | Age: 71
End: 2021-09-23

## 2021-09-23 DIAGNOSIS — K75.4 AUTOIMMUNE HEPATITIS (HCC): Primary | ICD-10-CM

## 2021-09-27 DIAGNOSIS — R93.2 ABNORMAL CT OF LIVER: ICD-10-CM

## 2021-09-27 DIAGNOSIS — K75.4 AUTOIMMUNE HEPATITIS (HCC): ICD-10-CM

## 2021-10-04 NOTE — TELEPHONE ENCOUNTER
Reviewed case with Dr. Toth, no indication of cirrhosis on biopsy and otherwise no findings to suggest cirrhosis on lab work or recent EGD. Recommended repeating ultrasound at 6-month interval to follow-up after recent CT. attempted to call patient to discuss, no answer, left message to return call.

## 2021-10-04 NOTE — TELEPHONE ENCOUNTER
Patient returned call. I informed her of note from cc. Patient would like to know when she needs to have labwork due to taking a high risk medication. I explained that cc was out of the office for the day, but would be back in the morning. Please advise.

## 2021-10-05 NOTE — TELEPHONE ENCOUNTER
Called and spoke with patient.  Reviewed recommendation for ultrasound in 6 months.  Discussed recommendation for updated CBC and hepatic function panel in the next week.  If labs are stable, will recheck lab work in 1 month and then moved to every 3-month labs for monitoring of autoimmune hepatitis.

## 2021-10-12 DIAGNOSIS — K75.4 AUTOIMMUNE HEPATITIS (HCC): Primary | ICD-10-CM

## 2021-10-12 LAB
ALBUMIN SERPL-MCNC: 4.3 G/DL (ref 3.7–4.7)
ALP SERPL-CCNC: 71 IU/L (ref 44–121)
ALT SERPL-CCNC: 37 IU/L (ref 0–32)
AST SERPL-CCNC: 54 IU/L (ref 0–40)
BASOPHILS # BLD AUTO: 0 X10E3/UL (ref 0–0.2)
BASOPHILS NFR BLD AUTO: 1 %
BILIRUB DIRECT SERPL-MCNC: 0.29 MG/DL (ref 0–0.4)
BILIRUB SERPL-MCNC: 1.1 MG/DL (ref 0–1.2)
EOSINOPHIL # BLD AUTO: 0.1 X10E3/UL (ref 0–0.4)
EOSINOPHIL NFR BLD AUTO: 2 %
ERYTHROCYTE [DISTWIDTH] IN BLOOD BY AUTOMATED COUNT: 13.2 % (ref 11.7–15.4)
HCT VFR BLD AUTO: 43.7 % (ref 34–46.6)
HGB BLD-MCNC: 14.2 G/DL (ref 11.1–15.9)
IMM GRANULOCYTES # BLD AUTO: 0 X10E3/UL (ref 0–0.1)
IMM GRANULOCYTES NFR BLD AUTO: 0 %
LYMPHOCYTES # BLD AUTO: 1.3 X10E3/UL (ref 0.7–3.1)
LYMPHOCYTES NFR BLD AUTO: 20 %
MCH RBC QN AUTO: 30.8 PG (ref 26.6–33)
MCHC RBC AUTO-ENTMCNC: 32.5 G/DL (ref 31.5–35.7)
MCV RBC AUTO: 95 FL (ref 79–97)
MONOCYTES # BLD AUTO: 0.6 X10E3/UL (ref 0.1–0.9)
MONOCYTES NFR BLD AUTO: 10 %
NEUTROPHILS # BLD AUTO: 4.4 X10E3/UL (ref 1.4–7)
NEUTROPHILS NFR BLD AUTO: 67 %
PLATELET # BLD AUTO: 162 X10E3/UL (ref 150–450)
PROT SERPL-MCNC: 7.2 G/DL (ref 6–8.5)
RBC # BLD AUTO: 4.61 X10E6/UL (ref 3.77–5.28)
WBC # BLD AUTO: 6.5 X10E3/UL (ref 3.4–10.8)

## 2021-10-28 RX ORDER — AZATHIOPRINE 50 MG/1
TABLET ORAL
Qty: 90 TABLET | Refills: 3 | Status: SHIPPED | OUTPATIENT
Start: 2021-10-28 | End: 2021-11-02

## 2021-11-02 RX ORDER — AZATHIOPRINE 50 MG/1
TABLET ORAL
Qty: 90 TABLET | Refills: 3 | Status: SHIPPED | OUTPATIENT
Start: 2021-11-02 | End: 2021-11-05

## 2021-11-05 RX ORDER — AZATHIOPRINE 50 MG/1
TABLET ORAL
Qty: 90 TABLET | Refills: 3 | Status: SHIPPED | OUTPATIENT
Start: 2021-11-05 | End: 2022-10-10

## 2021-11-15 DIAGNOSIS — K75.4 AUTOIMMUNE HEPATITIS (HCC): Primary | ICD-10-CM

## 2021-11-15 DIAGNOSIS — Z79.899 HIGH RISK MEDICATION USE: ICD-10-CM

## 2021-12-29 RX ORDER — PREDNISONE 1 MG/1
TABLET ORAL
Qty: 90 TABLET | Refills: 3 | Status: SHIPPED | OUTPATIENT
Start: 2021-12-29 | End: 2022-07-25

## 2022-02-01 DIAGNOSIS — K75.4 AUTOIMMUNE HEPATITIS: ICD-10-CM

## 2022-02-01 DIAGNOSIS — Z79.899 HIGH RISK MEDICATION USE: ICD-10-CM

## 2022-02-02 LAB
ALBUMIN SERPL-MCNC: 5 G/DL (ref 3.7–4.7)
ALP SERPL-CCNC: 73 IU/L (ref 44–121)
ALT SERPL-CCNC: 21 IU/L (ref 0–32)
AST SERPL-CCNC: 33 IU/L (ref 0–40)
BASOPHILS # BLD AUTO: 0 X10E3/UL (ref 0–0.2)
BASOPHILS NFR BLD AUTO: 0 %
BILIRUB DIRECT SERPL-MCNC: 0.28 MG/DL (ref 0–0.4)
BILIRUB SERPL-MCNC: 1.2 MG/DL (ref 0–1.2)
EOSINOPHIL # BLD AUTO: 0.1 X10E3/UL (ref 0–0.4)
EOSINOPHIL NFR BLD AUTO: 1 %
ERYTHROCYTE [DISTWIDTH] IN BLOOD BY AUTOMATED COUNT: 13.1 % (ref 11.7–15.4)
HCT VFR BLD AUTO: 46 % (ref 34–46.6)
HGB BLD-MCNC: 15.3 G/DL (ref 11.1–15.9)
IMM GRANULOCYTES # BLD AUTO: 0 X10E3/UL (ref 0–0.1)
IMM GRANULOCYTES NFR BLD AUTO: 0 %
LYMPHOCYTES # BLD AUTO: 2.3 X10E3/UL (ref 0.7–3.1)
LYMPHOCYTES NFR BLD AUTO: 32 %
MCH RBC QN AUTO: 30.2 PG (ref 26.6–33)
MCHC RBC AUTO-ENTMCNC: 33.3 G/DL (ref 31.5–35.7)
MCV RBC AUTO: 91 FL (ref 79–97)
MONOCYTES # BLD AUTO: 0.6 X10E3/UL (ref 0.1–0.9)
MONOCYTES NFR BLD AUTO: 8 %
NEUTROPHILS # BLD AUTO: 4.2 X10E3/UL (ref 1.4–7)
NEUTROPHILS NFR BLD AUTO: 59 %
PLATELET # BLD AUTO: 155 X10E3/UL (ref 150–450)
PROT SERPL-MCNC: 7.8 G/DL (ref 6–8.5)
RBC # BLD AUTO: 5.07 X10E6/UL (ref 3.77–5.28)
WBC # BLD AUTO: 7.3 X10E3/UL (ref 3.4–10.8)

## 2022-02-15 NOTE — PROGRESS NOTES
"Chief Complaint   Patient presents with   • Abdominal Pain         History of Present Illness  Patient is a 71-year-old female who presents today for follow-up. She has a history of autoimmune hepatitis for which treatment has included prednisone and azathioprine.  She underwent a CT scan August 2021 which showed changes to liver morphology concerning for chronic liver disease/cirrhosis and changes of gastric fundoplication that appeared to be superiorly migrated.  FibroScan was attempted but was unable to be performed.    Patient presents today for follow-up.  She reports she has continued to experience abdominal pain.  Pain is located to the epigastric area and comes and goes.  It is described as a burning sensation.  She denies any nausea or vomiting and she feels reflux symptoms have been well controlled with pantoprazole.    Regarding autoimmune hepatitis, she continues on azathioprine 50 mg daily and prednisone 5 mg daily.  Her transaminases have normalized over the last 3 to 4 months.         Result Review :       CT Abdomen Pelvis With Contrast (09/03/2021 08:38)   SCANNED - IMAGING (09/21/2021)   CT Abdomen Pelvis With Contrast (09/03/2021 08:38)   Office Visit with Oly Talavera APRN (08/12/2021)   Office Visit with Oly Talavera APRN (07/08/2021)   Tissue Pathology Exam (06/29/2021 10:30)   CT Needle Biopsy Liver (06/29/2021 10:33)   US Gallbladder (05/14/2021 10:01)   NM Hepatobiliary Without CCK (04/16/2021 10:36)   Office Visit with Javier Toth MD (04/08/2021)       Vital Signs:   /82   Pulse 76   Temp 98.9 °F (37.2 °C)   Ht 167.6 cm (66\")   Wt 93.4 kg (205 lb 12.8 oz)   SpO2 99%   BMI 33.22 kg/m²     Body mass index is 33.22 kg/m².     Physical Exam  Vitals reviewed.   Constitutional:       General: She is not in acute distress.     Appearance: She is well-developed.   HENT:      Head: Normocephalic and atraumatic.   Pulmonary:      Effort: Pulmonary effort is normal. No " respiratory distress.   Abdominal:      General: Abdomen is flat. Bowel sounds are normal. There is no distension.      Palpations: Abdomen is soft.      Tenderness: There is no abdominal tenderness.   Skin:     General: Skin is dry.      Coloration: Skin is not pale.   Neurological:      Mental Status: She is alert and oriented to person, place, and time.   Psychiatric:         Thought Content: Thought content normal.           Assessment and Plan    Diagnoses and all orders for this visit:    1. Autoimmune hepatitis (HCC) (Primary)    2. Epigastric pain  -     Amylase  -     Lipase  -     CBC & Differential; Future  -     Hepatic Function Panel; Future    3. Gastroesophageal reflux disease without esophagitis    Other orders  -     sucralfate (Carafate) 1 GM/10ML suspension; Take 10 mL by mouth 3 (Three) Times a Day.  Dispense: 1200 mL; Refill: 1         Discussion  Patient presents today for follow-up.  Autoimmune hepatitis has improved since initiating therapy.  We will begin tapering off of prednisone and continue azathioprine.  If transaminases worsen, may need to resume treatment with prednisone.    Regarding epigastric burning, suspect this is peptic in origin, will continue pantoprazole and start Carafate.  We will check amylase and lipase today to assess for any evidence of pancreatitis which she could be at risk for due to treatment with azathioprine.          Follow Up   Return in about 6 months (around 8/17/2022).    Patient Instructions   For autoimmune hepatitis, continue azathioprine 50 mg once daily.     For autoimmune hepatitis, since liver enzymes have normalized, it is OK to stop prednisone. Take 1/2 tablet daily for 1 week and then 1/2 tablet every other day for 1 week and then stop taking.    For monitoring of autoimmune hepatitis, we will obtain ultrasound in March 2022.    For monitoring of autoimmune hepatitis, we will plan to check lab work May 2022 for monitoring.    For epigastric pain,  continue pantoprazole daily and start Carafate as prescribed.

## 2022-02-17 ENCOUNTER — OFFICE VISIT (OUTPATIENT)
Dept: GASTROENTEROLOGY | Facility: CLINIC | Age: 72
End: 2022-02-17

## 2022-02-17 VITALS
TEMPERATURE: 98.9 F | HEIGHT: 66 IN | DIASTOLIC BLOOD PRESSURE: 82 MMHG | OXYGEN SATURATION: 99 % | HEART RATE: 76 BPM | WEIGHT: 205.8 LBS | BODY MASS INDEX: 33.07 KG/M2 | SYSTOLIC BLOOD PRESSURE: 144 MMHG

## 2022-02-17 DIAGNOSIS — K75.4 AUTOIMMUNE HEPATITIS: Primary | ICD-10-CM

## 2022-02-17 DIAGNOSIS — R10.13 EPIGASTRIC PAIN: ICD-10-CM

## 2022-02-17 DIAGNOSIS — K21.9 GASTROESOPHAGEAL REFLUX DISEASE WITHOUT ESOPHAGITIS: ICD-10-CM

## 2022-02-17 PROCEDURE — 99214 OFFICE O/P EST MOD 30 MIN: CPT | Performed by: NURSE PRACTITIONER

## 2022-02-17 RX ORDER — SUCRALFATE ORAL 1 G/10ML
1 SUSPENSION ORAL 3 TIMES DAILY
Qty: 1200 ML | Refills: 1 | Status: SHIPPED | OUTPATIENT
Start: 2022-02-17 | End: 2022-02-22

## 2022-02-17 NOTE — PATIENT INSTRUCTIONS
For autoimmune hepatitis, continue azathioprine 50 mg once daily.     For autoimmune hepatitis, since liver enzymes have normalized, it is OK to stop prednisone. Take 1/2 tablet daily for 1 week and then 1/2 tablet every other day for 1 week and then stop taking.    For monitoring of autoimmune hepatitis, we will obtain ultrasound in March 2022.    For monitoring of autoimmune hepatitis, we will plan to check lab work May 2022 for monitoring.    For epigastric pain, continue pantoprazole daily and start Carafate as prescribed.

## 2022-02-18 LAB
AMYLASE SERPL-CCNC: 103 U/L (ref 31–110)
LIPASE SERPL-CCNC: 14 U/L (ref 14–85)

## 2022-02-21 ENCOUNTER — TELEPHONE (OUTPATIENT)
Dept: GASTROENTEROLOGY | Facility: CLINIC | Age: 72
End: 2022-02-21

## 2022-02-22 RX ORDER — SUCRALFATE 1 G/1
1 TABLET ORAL 2 TIMES DAILY
Qty: 60 TABLET | Refills: 1 | Status: ON HOLD | OUTPATIENT
Start: 2022-02-22 | End: 2022-12-29

## 2022-03-15 ENCOUNTER — HOSPITAL ENCOUNTER (OUTPATIENT)
Dept: ULTRASOUND IMAGING | Facility: HOSPITAL | Age: 72
Discharge: HOME | End: 2022-03-15
Admitting: NURSE PRACTITIONER

## 2022-03-15 DIAGNOSIS — K75.4 AUTOIMMUNE HEPATITIS: ICD-10-CM

## 2022-03-15 PROCEDURE — 76705 ECHO EXAM OF ABDOMEN: CPT

## 2022-03-16 DIAGNOSIS — K74.69 OTHER CIRRHOSIS OF LIVER: ICD-10-CM

## 2022-03-16 DIAGNOSIS — K75.4 AUTOIMMUNE HEPATITIS: Primary | ICD-10-CM

## 2022-07-25 ENCOUNTER — OFFICE VISIT (OUTPATIENT)
Dept: GASTROENTEROLOGY | Facility: CLINIC | Age: 72
End: 2022-07-25

## 2022-07-25 VITALS
HEART RATE: 64 BPM | DIASTOLIC BLOOD PRESSURE: 72 MMHG | SYSTOLIC BLOOD PRESSURE: 130 MMHG | OXYGEN SATURATION: 97 % | HEIGHT: 66 IN | BODY MASS INDEX: 31.76 KG/M2 | WEIGHT: 197.6 LBS | TEMPERATURE: 97.4 F

## 2022-07-25 DIAGNOSIS — K57.92 DIVERTICULITIS: ICD-10-CM

## 2022-07-25 DIAGNOSIS — K75.4 AUTOIMMUNE HEPATITIS: Primary | ICD-10-CM

## 2022-07-25 DIAGNOSIS — K74.69 OTHER CIRRHOSIS OF LIVER: ICD-10-CM

## 2022-07-25 PROCEDURE — 99214 OFFICE O/P EST MOD 30 MIN: CPT | Performed by: NURSE PRACTITIONER

## 2022-07-25 RX ORDER — AMOXICILLIN AND CLAVULANATE POTASSIUM 875; 125 MG/1; MG/1
1 TABLET, FILM COATED ORAL 2 TIMES DAILY
Qty: 20 TABLET | Refills: 0 | Status: SHIPPED | OUTPATIENT
Start: 2022-07-25 | End: 2022-08-04

## 2022-07-25 NOTE — PATIENT INSTRUCTIONS
For diverticulitis, complete treatment with Augmentin as prescribed.    While you are having pain, recommend following a soft and bland diet.  Once you are feeling better, for diverticulosis, follow a high-fiber diet.     For monitoring of cirrhosis and autoimmune hepatitis, we will plan on lab work and ultrasound to be done September 2022.    For autoimmune hepatitis, continue treatment with azathioprine as prescribed.

## 2022-07-28 RX ORDER — PANTOPRAZOLE SODIUM 40 MG/1
TABLET, DELAYED RELEASE ORAL
Qty: 90 TABLET | Refills: 3 | Status: SHIPPED | OUTPATIENT
Start: 2022-07-28

## 2022-08-24 ENCOUNTER — OFFICE VISIT (OUTPATIENT)
Dept: INTERNAL MEDICINE | Facility: CLINIC | Age: 72
End: 2022-08-24

## 2022-08-24 ENCOUNTER — LAB (OUTPATIENT)
Dept: LAB | Facility: HOSPITAL | Age: 72
End: 2022-08-24

## 2022-08-24 VITALS
DIASTOLIC BLOOD PRESSURE: 72 MMHG | TEMPERATURE: 97.7 F | SYSTOLIC BLOOD PRESSURE: 128 MMHG | HEIGHT: 66 IN | BODY MASS INDEX: 31.66 KG/M2 | WEIGHT: 197 LBS

## 2022-08-24 DIAGNOSIS — Z23 NEED FOR HEPATITIS B VACCINATION: ICD-10-CM

## 2022-08-24 DIAGNOSIS — E11.9 TYPE 2 DIABETES MELLITUS WITHOUT COMPLICATION, WITHOUT LONG-TERM CURRENT USE OF INSULIN: Primary | ICD-10-CM

## 2022-08-24 DIAGNOSIS — M85.80 OSTEOPENIA, UNSPECIFIED LOCATION: ICD-10-CM

## 2022-08-24 DIAGNOSIS — E53.8 B12 DEFICIENCY: ICD-10-CM

## 2022-08-24 PROBLEM — R10.13 EPIGASTRIC PAIN: Status: RESOLVED | Noted: 2021-02-05 | Resolved: 2022-08-24

## 2022-08-24 PROBLEM — Z78.0 MENOPAUSE: Status: RESOLVED | Noted: 2017-08-29 | Resolved: 2022-08-24

## 2022-08-24 PROBLEM — M17.9 OSTEOARTHRITIS OF KNEE: Status: RESOLVED | Noted: 2017-05-02 | Resolved: 2022-08-24

## 2022-08-24 PROBLEM — K75.4 AUTOIMMUNE HEPATITIS: Status: ACTIVE | Noted: 2022-08-24

## 2022-08-24 PROBLEM — B02.9 HERPES ZOSTER: Status: RESOLVED | Noted: 2019-09-20 | Resolved: 2022-08-24

## 2022-08-24 PROBLEM — R74.8 ELEVATED LIVER ENZYMES: Status: RESOLVED | Noted: 2018-06-15 | Resolved: 2022-08-24

## 2022-08-24 LAB
25(OH)D3 SERPL-MCNC: 33.9 NG/ML (ref 30–100)
ALBUMIN SERPL-MCNC: 4 G/DL (ref 3.5–5.2)
ALBUMIN UR-MCNC: <1.2 MG/DL
ALBUMIN/GLOB SERPL: 1.3 G/DL
ALP SERPL-CCNC: 93 U/L (ref 39–117)
ALT SERPL W P-5'-P-CCNC: 18 U/L (ref 1–33)
ANION GAP SERPL CALCULATED.3IONS-SCNC: 10.7 MMOL/L (ref 5–15)
AST SERPL-CCNC: 28 U/L (ref 1–32)
BILIRUB SERPL-MCNC: 1.4 MG/DL (ref 0–1.2)
BUN SERPL-MCNC: 10 MG/DL (ref 8–23)
BUN/CREAT SERPL: 12.3 (ref 7–25)
CALCIUM SPEC-SCNC: 9.8 MG/DL (ref 8.6–10.5)
CHLORIDE SERPL-SCNC: 102 MMOL/L (ref 98–107)
CHOLEST SERPL-MCNC: 260 MG/DL (ref 0–200)
CO2 SERPL-SCNC: 26.3 MMOL/L (ref 22–29)
CREAT SERPL-MCNC: 0.81 MG/DL (ref 0.57–1)
CREAT UR-MCNC: 54.2 MG/DL
EGFRCR SERPLBLD CKD-EPI 2021: 76.8 ML/MIN/1.73
GLOBULIN UR ELPH-MCNC: 3.2 GM/DL
GLUCOSE SERPL-MCNC: 99 MG/DL (ref 65–99)
HBA1C MFR BLD: 6.3 % (ref 4.8–5.6)
HDLC SERPL QL: 2.52
HDLC SERPL-MCNC: 103 MG/DL (ref 40–60)
LDLC SERPL CALC-MCNC: 146 MG/DL (ref 0–100)
MICROALBUMIN/CREAT UR: NORMAL MG/G{CREAT}
POTASSIUM SERPL-SCNC: 3.6 MMOL/L (ref 3.5–5.2)
PROT SERPL-MCNC: 7.2 G/DL (ref 6–8.5)
SODIUM SERPL-SCNC: 139 MMOL/L (ref 136–145)
TRIGL SERPL-MCNC: 69 MG/DL (ref 0–150)
TSH SERPL DL<=0.05 MIU/L-ACNC: 0.86 UIU/ML (ref 0.27–4.2)
VIT B12 BLD-MCNC: 1706 PG/ML (ref 211–946)
VLDLC SERPL-MCNC: 11 MG/DL (ref 5–40)

## 2022-08-24 PROCEDURE — 82043 UR ALBUMIN QUANTITATIVE: CPT | Performed by: PHYSICIAN ASSISTANT

## 2022-08-24 PROCEDURE — 99204 OFFICE O/P NEW MOD 45 MIN: CPT | Performed by: PHYSICIAN ASSISTANT

## 2022-08-24 PROCEDURE — 36415 COLL VENOUS BLD VENIPUNCTURE: CPT | Performed by: PHYSICIAN ASSISTANT

## 2022-08-24 PROCEDURE — 84443 ASSAY THYROID STIM HORMONE: CPT | Performed by: PHYSICIAN ASSISTANT

## 2022-08-24 PROCEDURE — 80061 LIPID PANEL: CPT | Performed by: PHYSICIAN ASSISTANT

## 2022-08-24 PROCEDURE — 82570 ASSAY OF URINE CREATININE: CPT | Performed by: PHYSICIAN ASSISTANT

## 2022-08-24 PROCEDURE — 80053 COMPREHEN METABOLIC PANEL: CPT | Performed by: PHYSICIAN ASSISTANT

## 2022-08-24 PROCEDURE — 83036 HEMOGLOBIN GLYCOSYLATED A1C: CPT | Performed by: PHYSICIAN ASSISTANT

## 2022-08-24 PROCEDURE — G0010 ADMIN HEPATITIS B VACCINE: HCPCS | Performed by: PHYSICIAN ASSISTANT

## 2022-08-24 PROCEDURE — 90746 HEPB VACCINE 3 DOSE ADULT IM: CPT | Performed by: PHYSICIAN ASSISTANT

## 2022-08-24 PROCEDURE — 82306 VITAMIN D 25 HYDROXY: CPT | Performed by: PHYSICIAN ASSISTANT

## 2022-08-24 PROCEDURE — 82607 VITAMIN B-12: CPT | Performed by: PHYSICIAN ASSISTANT

## 2022-08-24 NOTE — PROGRESS NOTES
Subjective   Chief Complaint   Patient presents with   • Establish Care   • Diabetes       History of Present Illness     Pt is a 72 yr old diabetic with autoimmune hepatitis in remission, HTN who presents today to establish care as a new patient. She sees Dr. Chowdhury, GI, for her autoimmune hepatitis. She is not currently on oral prednisone, her LFTs most recently were normal. She is due for lab work for her diabetes. She has been well controlled. She has no angina, palpitations, SOA or GI symptoms presently. Denies peripheral neuropathy sx.     Has a pulling sensation in her right upper chest for the last few days, can reproduce it with palpation.      Patient Active Problem List   Diagnosis   • Gastroesophageal reflux disease   • History of Nissen fundoplication   • History of colonic polyps   • Colon cancer screening   • Allergic rhinitis   • Anxiety disorder   • AV david re-entry tachycardia (HCC)   • Cobalamin deficiency   • Delayed gastric emptying   • Diarrhea   • Diastolic dysfunction   • Early satiety   • Edema   • Elevated antinuclear antibody (AZALIA) level   • Fatigue   • Fatty liver   • Headache   • Hiatal hernia   • Hypercholesterolemia   • Hypertensive disorder   • Incisional hernia   • Knee pain   • Lesion of liver   • Backache   • Low back pain   • Lumbosacral stenosis   • Metabolic syndrome   • Myalgia   • Obesity with body mass index 30 or greater   • Osteoarthritis of right knee   • Osteopenia   • Paroxysmal supraventricular tachycardia (HCC)   • Polyp of colon   • Sciatica of right side   • Shortness of breath   • Sleep apnea   • Type 2 diabetes mellitus (HCC)   • Right upper quadrant pain   • Esophagitis   • Gastric ulcer   • Autoimmune hepatitis (HCC)       Allergies   Allergen Reactions   • Fish Oil Itching   • Rocuronium Anaphylaxis   • Shellfish-Derived Products Nausea And Vomiting       Current Outpatient Medications on File Prior to Visit   Medication Sig Dispense Refill   • amLODIPine  (NORVASC) 10 MG tablet Take 10 mg by mouth Daily.     • Aspirin Buf,CaCarb-MgCarb-MgO, 81 MG tablet Take 81 mg by mouth Daily.     • azaTHIOprine (IMURAN) 50 MG tablet TAKE 1 TABLET DAILY 90 tablet 3   • fexofenadine (ALLEGRA) 180 MG tablet Take 180 mg by mouth Daily.     • fluticasone (FLONASE) 50 MCG/ACT nasal spray 2 sprays into the nostril(s) as directed by provider Daily.     • furosemide (LASIX) 20 MG tablet Take 20 mg by mouth Daily.     • losartan (COZAAR) 50 MG tablet Take 50 mg by mouth Daily.     • LUMIGAN 0.01 % ophthalmic drops Administer 1 drop to both eyes Every Night.     • metoprolol succinate XL (TOPROL-XL) 50 MG 24 hr tablet Take 50 mg by mouth Daily.     • Multiple Vitamin (MULTIVITAMIN) tablet Take 1 tablet by mouth Daily.     • pantoprazole (PROTONIX) 40 MG EC tablet TAKE 1 TABLET DAILY ONE HOUR BEFORE FIRST MEAL OF THE DAY 90 tablet 3   • VITAMIN B COMPLEX-C PO Take  by mouth.     • vitamin D3 125 MCG (5000 UT) capsule capsule Take 5,000 Units by mouth Daily.     • sucralfate (CARAFATE) 1 g tablet Take 1 tablet by mouth 2 (Two) Times a Day. 60 tablet 1     No current facility-administered medications on file prior to visit.       Past Medical History:   Diagnosis Date   • Abnormal blood chemistry    • Acid reflux disease    • Acute sinusitis    • Allergic rhinitis    • Anxiety disorder    • Arthritis    • AV david re-entry tachycardia (HCC)    • BMI 34.0-34.9,adult    • Cancer (HCC)     hysterectomy partial    • Cataract    • Deficiency of vitamin B12    • Elevated liver enzymes    • Elevated liver function tests    • Encounter for screening for osteoporosis    • Fatigue    • GERD (gastroesophageal reflux disease)    • Hiatal hernia    • High blood pressure    • High cholesterol    • History of sinusitis    • Hyperlipidemia    • Hypertension    • Knee pain    • Liver disease    • Lower back pain    • Lumbosacral spinal stenosis    • Menopause    • Metabolic syndrome    • Musculoskeletal back  pain    • Osteoarthritis of both knees    • Osteopenia of multiple sites    • Paroxysmal supraventricular tachycardia (HCC)    • Right flank discomfort    • Sciatica of right side    • Sleep apnea     doesn't wear- bothers her with reflux   • Sleep disturbance    • Stomach ulcer    • Type 2 diabetes mellitus (HCC)    • Visit for screening mammogram    • Weight loss counseling, encounter for        Family History   Problem Relation Age of Onset   • Alzheimer's disease Mother    • Kidney failure Father    • Alzheimer's disease Brother    • Lung cancer Brother    • Stomach cancer Brother    • Alcohol abuse Other    • Hypertension Other    • Arthritis Other    • Colon cancer Neg Hx    • Colon polyps Neg Hx    • Crohn's disease Neg Hx    • Irritable bowel syndrome Neg Hx    • Ulcerative colitis Neg Hx        Social History     Socioeconomic History   • Marital status:    Tobacco Use   • Smoking status: Former Smoker   • Smokeless tobacco: Never Used   Vaping Use   • Vaping Use: Never used   Substance and Sexual Activity   • Alcohol use: Yes     Comment: social   • Drug use: Never   • Sexual activity: Defer       Past Surgical History:   Procedure Laterality Date   • COLONOSCOPY     • COLONOSCOPY Left 03/08/2021    Procedure: COLONOSCOPY;  Surgeon: Javier Toth MD;  [place] [facility] [place]  Service: Gastroenterology;  Laterality: Left;   • ENDOSCOPY Left 03/08/2021    Procedure: ESOPHAGOGASTRODUODENOSCOPY;  Surgeon: Javier Toth MD;  [place] [facility] [place];  Service: Gastroenterology;  Laterality: Left;   • HEMORROIDECTOMY     • HERNIA REPAIR     • HYSTERECTOMY      due to cancer   • INGUINAL HERNIA REPAIR     • KNEE SURGERY     • SINUS SURGERY     • TUBAL ABDOMINAL LIGATION     • UPPER GASTROINTESTINAL ENDOSCOPY           The following portions of the patient's history were reviewed and updated as appropriate: problem list, allergies, current medications, past medical history, past family  "history, past social history and past surgical history.    ROS    Immunization History   Administered Date(s) Administered   • COVID-19 (PFIZER) PURPLE CAP 02/23/2021, 03/16/2021, 10/25/2021   • Fluad Quad 65+ 09/11/2020, 09/21/2021   • Fluzone High Dose =>65 Years ([employer] ONLY) 12/11/2015, 12/02/2016, 12/04/2017, 10/09/2018, 10/23/2019   • Hepatitis B Vaccine Adult IM 08/24/2022   • Pneumococcal Conjugate 13-Valent (PCV13) 12/11/2015   • Pneumococcal Polysaccharide (PPSV23) 08/28/2017       Objective   Vitals:    08/24/22 0922 08/24/22 0959   BP:  128/72   Temp: 97.7 °F (36.5 °C)    Weight: 89.4 kg (197 lb)    Height: 167.6 cm (65.98\")      Body mass index is 31.81 kg/m².  Physical Exam  Vitals reviewed.   Constitutional:       Appearance: She is well-developed.   HENT:      Head: Normocephalic and atraumatic.   Cardiovascular:      Rate and Rhythm: Normal rate and regular rhythm.      Heart sounds: Normal heart sounds, S1 normal and S2 normal.   Pulmonary:      Effort: Pulmonary effort is normal.      Breath sounds: Normal breath sounds.      Comments: Chest wall tenderness at 2nd and 3rd intercostals on the right  Abdominal:      General: Abdomen is flat. Bowel sounds are normal.      Palpations: Abdomen is soft.      Tenderness: There is no abdominal tenderness.   Skin:     General: Skin is warm.   Neurological:      Mental Status: She is alert.   Psychiatric:         Behavior: Behavior normal.           Assessment & Plan   Diagnoses and all orders for this visit:    1. Type 2 diabetes mellitus without complication, without long-term current use of insulin (HCC) (Primary)  -     Lipid Panel With / Chol / HDL Ratio  -     Comprehensive Metabolic Panel  -     Hemoglobin A1c  -     TSH  -     Microalbumin / Creatinine Urine Ratio - Urine, Clean Catch    2. Osteopenia, unspecified location  -     Vitamin D 25 Hydroxy    3. B12 deficiency  -     Vitamin B12    4. Need for hepatitis B vaccination  -     Hepatitis B " Vaccine Adult IM (ENERGIX/RECOMBIVAX)    Will get labs today, reviewed GI notes and previous PCP office notes. Will start Hep B series.     Return in about 3 months (around 11/24/2022) for Lab Today, Lab Appt Before FUP.

## 2022-08-30 DIAGNOSIS — K75.4 AUTOIMMUNE HEPATITIS: Primary | ICD-10-CM

## 2022-08-30 DIAGNOSIS — E78.00 HYPERCHOLESTEROLEMIA: Primary | ICD-10-CM

## 2022-08-30 DIAGNOSIS — K74.69 OTHER CIRRHOSIS OF LIVER: ICD-10-CM

## 2022-08-30 LAB
AFP-TM SERPL-MCNC: 1.7 NG/ML (ref 0–9.2)
ALBUMIN SERPL-MCNC: 4.4 G/DL (ref 3.7–4.7)
ALBUMIN/GLOB SERPL: 1.6 {RATIO} (ref 1.2–2.2)
ALP SERPL-CCNC: 96 IU/L (ref 44–121)
ALT SERPL-CCNC: 15 IU/L (ref 0–32)
AMMONIA PLAS-MCNC: 40 UG/DL (ref 31–169)
AST SERPL-CCNC: 27 IU/L (ref 0–40)
BASOPHILS # BLD AUTO: 0 X10E3/UL (ref 0–0.2)
BASOPHILS NFR BLD AUTO: 0 %
BILIRUB DIRECT SERPL-MCNC: 0.3 MG/DL (ref 0–0.4)
BILIRUB SERPL-MCNC: 1.3 MG/DL (ref 0–1.2)
BUN SERPL-MCNC: 14 MG/DL (ref 8–27)
BUN/CREAT SERPL: 16 (ref 12–28)
CALCIUM SERPL-MCNC: 11.1 MG/DL (ref 8.7–10.3)
CHLORIDE SERPL-SCNC: 99 MMOL/L (ref 96–106)
CO2 SERPL-SCNC: 24 MMOL/L (ref 20–29)
CREAT SERPL-MCNC: 0.89 MG/DL (ref 0.57–1)
EGFRCR-CYS SERPLBLD CKD-EPI 2021: 68 ML/MIN/1.73
EOSINOPHIL # BLD AUTO: 0 X10E3/UL (ref 0–0.4)
EOSINOPHIL NFR BLD AUTO: 0 %
ERYTHROCYTE [DISTWIDTH] IN BLOOD BY AUTOMATED COUNT: 13.1 % (ref 11.7–15.4)
GLOBULIN SER CALC-MCNC: 2.8 G/DL (ref 1.5–4.5)
GLUCOSE SERPL-MCNC: 97 MG/DL (ref 65–99)
HCT VFR BLD AUTO: 45.2 % (ref 34–46.6)
HGB BLD-MCNC: 14.7 G/DL (ref 11.1–15.9)
IMM GRANULOCYTES # BLD AUTO: 0 X10E3/UL (ref 0–0.1)
IMM GRANULOCYTES NFR BLD AUTO: 0 %
INR PPP: 1 (ref 0.9–1.2)
LYMPHOCYTES # BLD AUTO: 1.2 X10E3/UL (ref 0.7–3.1)
LYMPHOCYTES NFR BLD AUTO: 27 %
MCH RBC QN AUTO: 30.3 PG (ref 26.6–33)
MCHC RBC AUTO-ENTMCNC: 32.5 G/DL (ref 31.5–35.7)
MCV RBC AUTO: 93 FL (ref 79–97)
MONOCYTES # BLD AUTO: 0.4 X10E3/UL (ref 0.1–0.9)
MONOCYTES NFR BLD AUTO: 10 %
NEUTROPHILS # BLD AUTO: 2.7 X10E3/UL (ref 1.4–7)
NEUTROPHILS NFR BLD AUTO: 63 %
PLATELET # BLD AUTO: 163 X10E3/UL (ref 150–450)
POTASSIUM SERPL-SCNC: 4.6 MMOL/L (ref 3.5–5.2)
PROT SERPL-MCNC: 7.2 G/DL (ref 6–8.5)
PROTHROMBIN TIME: 10.9 SEC (ref 9.1–12)
RBC # BLD AUTO: 4.85 X10E6/UL (ref 3.77–5.28)
SODIUM SERPL-SCNC: 140 MMOL/L (ref 134–144)
WBC # BLD AUTO: 4.2 X10E3/UL (ref 3.4–10.8)

## 2022-08-30 RX ORDER — ATORVASTATIN CALCIUM 20 MG/1
20 TABLET, FILM COATED ORAL DAILY
Qty: 90 TABLET | Refills: 0 | Status: SHIPPED | OUTPATIENT
Start: 2022-08-30 | End: 2022-11-14

## 2022-09-12 ENCOUNTER — LAB (OUTPATIENT)
Dept: LAB | Facility: HOSPITAL | Age: 72
End: 2022-09-12

## 2022-09-12 DIAGNOSIS — K75.4 AUTOIMMUNE HEPATITIS: Primary | ICD-10-CM

## 2022-09-12 LAB
ALBUMIN SERPL-MCNC: 4 G/DL (ref 3.5–5.2)
ALP SERPL-CCNC: 81 U/L (ref 39–117)
ALT SERPL W P-5'-P-CCNC: 18 U/L (ref 1–33)
AST SERPL-CCNC: 23 U/L (ref 1–32)
BILIRUB CONJ SERPL-MCNC: 0.2 MG/DL (ref 0–0.3)
BILIRUB INDIRECT SERPL-MCNC: 0.9 MG/DL
BILIRUB SERPL-MCNC: 1.1 MG/DL (ref 0–1.2)
PROT SERPL-MCNC: 6.4 G/DL (ref 6–8.5)

## 2022-09-12 PROCEDURE — 80076 HEPATIC FUNCTION PANEL: CPT | Performed by: PHYSICIAN ASSISTANT

## 2022-09-12 PROCEDURE — 36415 COLL VENOUS BLD VENIPUNCTURE: CPT | Performed by: PHYSICIAN ASSISTANT

## 2022-09-16 ENCOUNTER — HOSPITAL ENCOUNTER (EMERGENCY)
Facility: HOSPITAL | Age: 72
Discharge: HOME | End: 2022-09-16
Attending: EMERGENCY MEDICINE | Admitting: EMERGENCY MEDICINE

## 2022-09-16 VITALS
OXYGEN SATURATION: 98 % | HEIGHT: 66 IN | WEIGHT: 197 LBS | HEART RATE: 63 BPM | BODY MASS INDEX: 31.66 KG/M2 | RESPIRATION RATE: 16 BRPM | DIASTOLIC BLOOD PRESSURE: 75 MMHG | TEMPERATURE: 98.1 F | SYSTOLIC BLOOD PRESSURE: 140 MMHG

## 2022-09-16 DIAGNOSIS — S76.212A STRAIN OF GROIN, LEFT, INITIAL ENCOUNTER: Primary | ICD-10-CM

## 2022-09-16 PROCEDURE — 99282 EMERGENCY DEPT VISIT SF MDM: CPT

## 2022-09-16 NOTE — ED TRIAGE NOTES
Pain in inner left thigh started last night    Patient was placed in face mask during first look triage.  Patient was wearing a face mask throughout encounter.  I wore personal protective equipment throughout the encounter.  Hand hygiene was performed before and after patient encounter.

## 2022-09-16 NOTE — ED PROVIDER NOTES
EMERGENCY DEPARTMENT ENCOUNTER  I wore full protective equipment throughout this patient encounter including a N95 mask, eye shield, gown and gloves. Hand hygiene was performed before donning protective equipment and after removal when leaving the room.    Room Number:  24/24  Date of encounter:  9/16/2022  PCP: Alice Lino PA-C    HPI:  Context: Esme Lemus is a 72 y.o. female who presents to the ED c/o chief complaint of left leg pain.  Patient complains of pain and her left inner thigh that began yesterday.  Patient reports pain began during the daytime, did not wake up with the pain.  Patient denies any falls or trauma.  Patient complains of a pulling sensation on her left inner thigh, no pain at rest, occurs with movement, occurs with walking.  Patient denies any pain when she is moving her leg without bearing weight.  Patient denies any redness warmth or swelling.  Patient denies any other complaints.    MEDICAL HISTORY REVIEW  Reviewed in EPIC    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/2016   • Cobalamin deficiency 06/15/2018   • Delayed gastric emptying 12/14/2018   • Diarrhea 11/01/2018   • Diastolic dysfunction 08/19/2019   • Early satiety 11/01/2018   • Edema 08/12/2019   • Elevated antinuclear antibody (AZALIA) level 01/22/2019   • Fatigue 08/25/2016   • Fatty liver 09/11/2020   • Headache 05/27/2020   • Hiatal hernia 10/09/2018   • Hypercholesterolemia 02/10/2015   • Hypertensive disorder 02/10/2015   • Incisional hernia 02/12/2013   • Knee pain 10/28/2016   • Lesion of liver 12/17/2019   • Backache 04/16/2018   • Low back pain 04/03/2017   • Lumbosacral stenosis 11/14/2017   •  Metabolic syndrome 08/25/2016   • Myalgia 01/18/2019   • Obesity with body mass index 30 or greater 08/28/2017   • Osteoarthritis of right knee 11/01/2016   • Osteopenia 08/28/2017   • Paroxysmal supraventricular tachycardia (HCC) 05/31/2016   • Polyp of colon 12/14/2018   • Sciatica of right side 12/04/2017   • Shortness of breath 08/12/2019   • Sleep apnea 02/10/2015   • Type 2 diabetes mellitus (HCC) 12/02/2016   • Right upper quadrant pain 11/14/2019   • Esophagitis 12/14/2018   • Gastric ulcer 05/01/2017   • Autoimmune hepatitis (HCC) 08/24/2022     Resolved Ambulatory Problems     Diagnosis Date Noted   • Epigastric pain 02/05/2021   • Elevated liver enzymes 06/15/2018   • Herpes zoster 09/20/2019   • Menopause 08/29/2017   • Osteoarthritis of knee 05/02/2017   • Encounter for screening mammogram for malignant neoplasm of breast 03/17/2015     Past Medical History:   Diagnosis Date   • Abnormal blood chemistry    • Acid reflux disease    • Acute sinusitis    • Arthritis    • BMI 34.0-34.9,adult    • Cancer (HCC)    • Cataract    • Deficiency of vitamin B12    • Elevated liver function tests    • Encounter for screening for osteoporosis    • GERD (gastroesophageal reflux disease)    • High blood pressure    • High cholesterol    • History of sinusitis    • Hyperlipidemia    • Hypertension    • Liver disease    • Lower back pain    • Lumbosacral spinal stenosis    • Musculoskeletal back pain    • Osteoarthritis of both knees    • Osteopenia of multiple sites    • Right flank discomfort    • Sleep disturbance    • Stomach ulcer    • Visit for screening mammogram    • Weight loss counseling, encounter for        PAST SURGICAL HISTORY  Past Surgical History:   Procedure Laterality Date   • COLONOSCOPY     • COLONOSCOPY Left 03/08/2021    Procedure: COLONOSCOPY;  Surgeon: Javier Toth MD;  Location[place] [facility] [place];  Service: Gastroenterology;  Laterality: Left;   • ENDOSCOPY Left 03/08/2021    Procedure:  ESOPHAGOGASTRODUODENOSCOPY;  Surgeon: Javier Toth MD;  [place] [facility] [place];  Service: Gastroenterology;  Laterality: Left;   • HEMORROIDECTOMY     • HERNIA REPAIR     • HYSTERECTOMY      due to cancer   • INGUINAL HERNIA REPAIR     • KNEE SURGERY     • SINUS SURGERY     • TUBAL ABDOMINAL LIGATION     • UPPER GASTROINTESTINAL ENDOSCOPY         FAMILY HISTORY  Family History   Problem Relation Age of Onset   • Alzheimer's disease Mother    • Kidney failure Father    • Alzheimer's disease Brother    • Lung cancer Brother    • Stomach cancer Brother    • Alcohol abuse Other    • Hypertension Other    • Arthritis Other    • Colon cancer Neg Hx    • Colon polyps Neg Hx    • Crohn's disease Neg Hx    • Irritable bowel syndrome Neg Hx    • Ulcerative colitis Neg Hx        SOCIAL HISTORY  Social History     Socioeconomic History   • Marital status:    Tobacco Use   • Smoking status: Former Smoker   • Smokeless tobacco: Never Used   Vaping Use   • Vaping Use: Never used   Substance and Sexual Activity   • Alcohol use: Yes     Comment: social   • Drug use: Never   • Sexual activity: Defer       ALLERGIES  Fish oil, Rocuronium, and Shellfish-derived products    The patient's allergies have been reviewed    REVIEW OF SYSTEMS  All systems reviewed and negative except for those discussed in HPI.     PHYSICAL EXAM  I have reviewed the triage vital signs and nursing notes.  ED Triage Vitals [09/16/22 1103]   Temp Heart Rate Resp BP SpO2   98.1 °F (36.7 °C) 78 16 -- 98 %      Temp src Heart Rate Source Patient Position BP Location FiO2 (%)   Tympanic Monitor -- -- --       General: No acute distress.  HENT: NCAT, PERRL, Nares patent.  Eyes: no scleral icterus.  Neck: trachea midline, no ROM limitations.  CV: regular rhythm, regular rate.  Respiratory: normal effort, CTAB.  Abdomen: soft, nondistended, NTTP, no rebound tenderness, no guarding or rigidity.  Musculoskeletal: no deformity.  Left lower extremity: Left  leg is normal in appearance, no redness warmth or swelling.  Hip is mobile, full range of motion, no pain with passive range of motion.  Thigh is nonswollen, nontender, normal in appearance.  Pain reproduced with abduction against resistance.  Neuro: alert, moves all extremities, follows commands.  Skin: warm, dry.    LAB RESULTS  No results found for this or any previous visit (from the past 24 hour(s)).    I ordered the above labs and reviewed the results.    RADIOLOGY  No Radiology Exams Resulted Within Past 24 Hours    I ordered the above noted radiological studies. I reviewed the images and results. I agree with the radiologist interpretation.    PROCEDURES  Procedures    MEDICATIONS GIVEN IN ER  Medications - No data to display    PROGRESS, DATA ANALYSIS, CONSULTS, AND MEDICAL DECISION MAKING  A complete history and physical exam have been performed.  All available laboratory and imaging results have been reviewed by myself prior to disposition.    MDM  After the initial H&P, I discussed pertinent information from history and physical exam with patient/family.  Discussed differential diagnosis.  Discussed plan for ED evaluation/workup/treatment.  All questions answered.  Patient/family is agreeable with plan.  ED Course as of 09/16/22 1311   Fri Sep 16, 2022   1311 Patient presents with left-sided groin strain.  No alarm signs or symptoms, patient only has pain with adduction.  Physical exam is reassuring.  Symptoms consistent with groin strain.  Discussed clinical impression with patient, discussed supportive care, discussed follow-up with primary care and orthopedics.  Extensive discussion return precautions, discharging. [JG]      ED Course User Index  [JG] Stevenson Barros MD       AS OF 13:11 EDT VITALS:    BP - 140/75  HR - 63  TEMP - 98.1 °F (36.7 °C) (Tympanic)  O2 SATS - 98%    DIAGNOSIS  Final diagnoses:   Strain of groin, left, initial encounter         DISPOSITION  DISCHARGE    Patient discharged  in stable condition.    Reviewed implications of results, diagnosis, meds, responsibility to follow up, warning signs and symptoms of possible worsening, potential complications and reasons to return to ER.    Patient/Family voiced understanding of above instructions.    Discussed plan for discharge, as there is no emergent indication for admission. Patient referred to primary care provider for BP management due to today's BP. Pt/family is agreeable and understands need for follow up and repeat testing.  Pt is aware that discharge does not mean that nothing is wrong but it indicates no emergency is present that requires admission and they must continue care with follow-up as given below or physician of their choice.     FOLLOW-UP  Alice Lino PA-C  [address] [facility] [address]  [place] [address]  [phone]    Schedule an appointment as soon as possible for a visit in 2 days      Pattie Rangel MD  [address] [facility] [address]  [place] [address]  [phone]    Schedule an appointment as soon as possible for a visit   as needed         Medication List      New Prescriptions    Diclofenac Sodium 1 % gel gel  Commonly known as: VOLTAREN  Apply 4 g topically to the appropriate area as directed 4 (Four) Times a Day As Needed (pain).           Where to Get Your Medications      You can get these medications from any pharmacy    Bring a paper prescription for each of these medications  · Diclofenac Sodium 1 % gel gel          Stevenson Barros MD  09/16/22 4172

## 2022-09-20 ENCOUNTER — APPOINTMENT (OUTPATIENT)
Dept: ULTRASOUND IMAGING | Facility: HOSPITAL | Age: 72
End: 2022-09-20

## 2022-09-21 ENCOUNTER — HOSPITAL ENCOUNTER (OUTPATIENT)
Dept: ULTRASOUND IMAGING | Facility: HOSPITAL | Age: 72
Discharge: HOME | End: 2022-09-21
Admitting: NURSE PRACTITIONER

## 2022-09-21 DIAGNOSIS — K74.69 OTHER CIRRHOSIS OF LIVER: ICD-10-CM

## 2022-09-21 DIAGNOSIS — K75.4 AUTOIMMUNE HEPATITIS: ICD-10-CM

## 2022-09-21 PROCEDURE — 76705 ECHO EXAM OF ABDOMEN: CPT

## 2022-09-23 DIAGNOSIS — K75.4 AUTOIMMUNE HEPATITIS: Primary | ICD-10-CM

## 2022-09-23 DIAGNOSIS — K74.69 OTHER CIRRHOSIS OF LIVER: ICD-10-CM

## 2022-09-26 ENCOUNTER — CLINICAL SUPPORT (OUTPATIENT)
Dept: INTERNAL MEDICINE | Facility: CLINIC | Age: 72
End: 2022-09-26

## 2022-09-26 DIAGNOSIS — Z23 NEED FOR HEPATITIS B VACCINATION: Primary | ICD-10-CM

## 2022-09-26 PROCEDURE — G0010 ADMIN HEPATITIS B VACCINE: HCPCS | Performed by: PHYSICIAN ASSISTANT

## 2022-09-26 PROCEDURE — 90746 HEPB VACCINE 3 DOSE ADULT IM: CPT | Performed by: PHYSICIAN ASSISTANT

## 2022-10-10 ENCOUNTER — LAB (OUTPATIENT)
Dept: LAB | Facility: HOSPITAL | Age: 72
End: 2022-10-10

## 2022-10-10 DIAGNOSIS — K75.4 AUTOIMMUNE HEPATITIS: ICD-10-CM

## 2022-10-10 DIAGNOSIS — K75.4 AUTOIMMUNE HEPATITIS: Primary | ICD-10-CM

## 2022-10-10 DIAGNOSIS — K74.69 OTHER CIRRHOSIS OF LIVER: ICD-10-CM

## 2022-10-10 PROCEDURE — 80053 COMPREHEN METABOLIC PANEL: CPT | Performed by: NURSE PRACTITIONER

## 2022-10-10 PROCEDURE — 82248 BILIRUBIN DIRECT: CPT | Performed by: PHYSICIAN ASSISTANT

## 2022-10-10 PROCEDURE — 82105 ALPHA-FETOPROTEIN SERUM: CPT | Performed by: NURSE PRACTITIONER

## 2022-10-10 PROCEDURE — 85025 COMPLETE CBC W/AUTO DIFF WBC: CPT | Performed by: NURSE PRACTITIONER

## 2022-10-10 PROCEDURE — 82140 ASSAY OF AMMONIA: CPT | Performed by: NURSE PRACTITIONER

## 2022-10-10 PROCEDURE — 85610 PROTHROMBIN TIME: CPT | Performed by: NURSE PRACTITIONER

## 2022-10-10 RX ORDER — AZATHIOPRINE 50 MG/1
TABLET ORAL
Qty: 90 TABLET | Refills: 3 | Status: SHIPPED | OUTPATIENT
Start: 2022-10-10

## 2022-10-11 DIAGNOSIS — K74.69 OTHER CIRRHOSIS OF LIVER: Primary | ICD-10-CM

## 2022-10-21 ENCOUNTER — TELEPHONE (OUTPATIENT)
Dept: GASTROENTEROLOGY | Facility: CLINIC | Age: 72
End: 2022-10-21

## 2022-10-26 ENCOUNTER — TELEPHONE (OUTPATIENT)
Dept: INTERNAL MEDICINE | Facility: CLINIC | Age: 72
End: 2022-10-26

## 2022-10-26 DIAGNOSIS — U07.1 COVID-19: Primary | ICD-10-CM

## 2022-10-26 RX ORDER — BENZONATATE 100 MG/1
100 CAPSULE ORAL 3 TIMES DAILY PRN
Qty: 30 CAPSULE | Refills: 0 | Status: SHIPPED | OUTPATIENT
Start: 2022-10-26 | End: 2022-11-22

## 2022-10-26 NOTE — TELEPHONE ENCOUNTER
Caller: Esme Lemus    Relationship to patient: Self    Best call back number: [phone]    Date of positive COVID19 test: HOME TEST 10/23/22 & LAB TEST 10/25/22    COVID19 symptoms: COUGH, FATIGUE    What is the patients preferred pharmacy:   [facility]/pharmacy #[id number] [address] AT [address] [phone] [facility] [phone] FX     OFFERED PT A VIRTUAL APPT, BUT SHE STATED SHE DOES NOT HAVE [employer] ACCOUNT.

## 2022-11-03 RX ORDER — METOPROLOL SUCCINATE 50 MG/1
50 TABLET, EXTENDED RELEASE ORAL DAILY
Qty: 90 TABLET | Refills: 0 | Status: SHIPPED | OUTPATIENT
Start: 2022-11-03 | End: 2023-02-08

## 2022-11-03 NOTE — TELEPHONE ENCOUNTER
Caller: Esme Lemus    Relationship: Self    Best call back number: [phone]    Requested Prescriptions:   Requested Prescriptions     Pending Prescriptions Disp Refills   • metoprolol succinate XL (TOPROL-XL) 50 MG 24 hr tablet       Sig: Take 1 tablet by mouth Daily.        Pharmacy where request should be sent: [facility] [address] [phone] [facility] [phone]      Additional details provided by patient: PATIENT REQUESTS A 90 DAY SUPPLY     Does the patient have less than a 3 day supply:  [] Yes  [x] No    Juanito Zepeda Rep   11/03/22 09:34 EDT

## 2022-11-07 ENCOUNTER — OFFICE VISIT (OUTPATIENT)
Dept: GASTROENTEROLOGY | Facility: CLINIC | Age: 72
End: 2022-11-07

## 2022-11-07 ENCOUNTER — PREP FOR SURGERY (OUTPATIENT)
Dept: SURGERY | Facility: SURGERY CENTER | Age: 72
End: 2022-11-07

## 2022-11-07 VITALS
DIASTOLIC BLOOD PRESSURE: 77 MMHG | TEMPERATURE: 97.1 F | HEIGHT: 66 IN | SYSTOLIC BLOOD PRESSURE: 131 MMHG | OXYGEN SATURATION: 97 % | BODY MASS INDEX: 31.55 KG/M2 | HEART RATE: 65 BPM | WEIGHT: 196.3 LBS

## 2022-11-07 DIAGNOSIS — R10.13 EPIGASTRIC PAIN: ICD-10-CM

## 2022-11-07 DIAGNOSIS — Z86.010 HISTORY OF COLON POLYPS: ICD-10-CM

## 2022-11-07 DIAGNOSIS — K75.4 AUTOIMMUNE HEPATITIS: ICD-10-CM

## 2022-11-07 DIAGNOSIS — K62.5 RECTAL BLEEDING: Primary | ICD-10-CM

## 2022-11-07 DIAGNOSIS — K74.69 OTHER CIRRHOSIS OF LIVER: ICD-10-CM

## 2022-11-07 PROCEDURE — 99214 OFFICE O/P EST MOD 30 MIN: CPT | Performed by: NURSE PRACTITIONER

## 2022-11-07 RX ORDER — METOPROLOL SUCCINATE 50 MG/1
1 TABLET, EXTENDED RELEASE ORAL DAILY
Status: ON HOLD | COMMUNITY
Start: 2022-08-23 | End: 2022-12-29

## 2022-11-07 RX ORDER — SODIUM CHLORIDE 0.9 % (FLUSH) 0.9 %
10 SYRINGE (ML) INJECTION AS NEEDED
Status: CANCELLED | OUTPATIENT
Start: 2022-11-07

## 2022-11-07 RX ORDER — SODIUM CHLORIDE 0.9 % (FLUSH) 0.9 %
3 SYRINGE (ML) INJECTION EVERY 12 HOURS SCHEDULED
Status: CANCELLED | OUTPATIENT
Start: 2022-11-07

## 2022-11-07 RX ORDER — SODIUM CHLORIDE, SODIUM LACTATE, POTASSIUM CHLORIDE, CALCIUM CHLORIDE 600; 310; 30; 20 MG/100ML; MG/100ML; MG/100ML; MG/100ML
30 INJECTION, SOLUTION INTRAVENOUS CONTINUOUS PRN
Status: CANCELLED | OUTPATIENT
Start: 2022-11-07

## 2022-11-07 NOTE — PATIENT INSTRUCTIONS
Schedule EGD and colonoscopy for further evaluation.     For monitoring of cirrhosis, blood work will be due January 2023.  For monitoring, ultrasound and additional blood work will be due March 2023.    For autoimmune hepatitis, continue azathioprine as prescribed.      For GERD, continue pantoprazole as prescribed.

## 2022-11-07 NOTE — PROGRESS NOTES
"Chief Complaint   Patient presents with   • Rectal Bleeding         History of Present Illness  Patient is a 72-year-old female who presents today for follow-up. She has a history of autoimmune hepatitis and cirrhosis.  For autoimmune hepatitis, continues on azathioprine 50 mg daily.  She also has a history of colon polyps, most recent colonoscopy March 2021 with multiple adenomas.  Also evidence of diverticulosis and internal hemorrhoids.    Patient presents today with concerns about rectal bleeding.  She reports she recently had 2 episodes of bleeding.  Blood is described as being dark red and present in the bowl.  She reports no associated abdominal or rectal pain.  She reports prior to the bleeding she had been somewhat constipated.    She has also been experiencing some epigastric pain described as burning.  This comes and goes.  She denies any nausea or vomiting.    Recently recovered from COVID infection, reports symptoms were mild.  She has been experiencing back and left leg pain, denies any swelling or edema.  She was evaluated in the ER for this and reports no positive findings.     Result Review :       Tissue Pathology Exam (03/08/2021 10:52)   COLONOSCOPY (03/08/2021 10:40)   US Liver (09/21/2022 06:52)   Comprehensive Metabolic Panel (10/10/2022 10:25)   Office Visit with Oly Talavera APRN (07/25/2022)   Office Visit with Oly Talavera APRN (02/17/2022)   CT Abdomen Pelvis With Contrast (09/03/2021 08:38)   SCANNED - IMAGING (09/21/2021)   CT Abdomen Pelvis With Contrast (09/03/2021 08:38)   Office Visit with Oly Talavera APRN (08/12/2021)       Vital Signs:   /77   Pulse 65   Temp 97.1 °F (36.2 °C)   Ht 167.6 cm (66\")   Wt 89 kg (196 lb 4.8 oz)   SpO2 97%   BMI 31.68 kg/m²     Body mass index is 31.68 kg/m².     Physical Exam  Vitals reviewed.   Constitutional:       General: She is not in acute distress.     Appearance: She is well-developed.   HENT:      Head: " Normocephalic and atraumatic.   Pulmonary:      Effort: Pulmonary effort is normal. No respiratory distress.   Abdominal:      General: Abdomen is flat. Bowel sounds are normal. There is no distension.      Palpations: Abdomen is soft. There is no hepatomegaly or splenomegaly.      Tenderness: There is no abdominal tenderness.   Musculoskeletal:      Right lower leg: No edema.      Left lower leg: No edema.   Skin:     General: Skin is dry.      Coloration: Skin is not pale.   Neurological:      Mental Status: She is alert and oriented to person, place, and time.   Psychiatric:         Thought Content: Thought content normal.           Assessment and Plan    Diagnoses and all orders for this visit:    1. Rectal bleeding (Primary)    2. Epigastric pain    3. History of colon polyps    4. Autoimmune hepatitis (HCC)    5. Other cirrhosis of liver (HCC)         Discussion  Patient presents today for follow-up with concerns about rectal bleeding.  Recommended colonoscopy for further evaluation.  Suspect this was likely hemorrhoidal but would recommend colonoscopy due to her history of multiple adenomas.  Patient also with epigastric burning, we will schedule EGD for further evaluation to assess for any evidence of peptic ulcer disease as well as rule out any upper GI source of bleeding.    Recent ultrasound and lab work for monitoring of cirrhosis/autoimmune hepatitis stable, will continue to monitor.    Recommended PCP follow-up regarding back pain and leg pain if this persists.          Follow Up   Return for Follow up to review results after testing complete.    Patient Instructions   Schedule EGD and colonoscopy for further evaluation.     For monitoring of cirrhosis, blood work will be due January 2023.  For monitoring, ultrasound and additional blood work will be due March 2023.    For autoimmune hepatitis, continue azathioprine as prescribed.      For GERD, continue pantoprazole as prescribed.

## 2022-11-14 DIAGNOSIS — E78.00 HYPERCHOLESTEROLEMIA: ICD-10-CM

## 2022-11-14 RX ORDER — ATORVASTATIN CALCIUM 20 MG/1
TABLET, FILM COATED ORAL
Qty: 90 TABLET | Refills: 3 | Status: SHIPPED | OUTPATIENT
Start: 2022-11-14

## 2022-11-22 ENCOUNTER — OFFICE VISIT (OUTPATIENT)
Dept: INTERNAL MEDICINE | Facility: CLINIC | Age: 72
End: 2022-11-22

## 2022-11-22 VITALS
BODY MASS INDEX: 31.98 KG/M2 | TEMPERATURE: 98.1 F | DIASTOLIC BLOOD PRESSURE: 70 MMHG | WEIGHT: 199 LBS | SYSTOLIC BLOOD PRESSURE: 120 MMHG | HEIGHT: 66 IN

## 2022-11-22 DIAGNOSIS — Z23 FLU VACCINE NEED: ICD-10-CM

## 2022-11-22 DIAGNOSIS — E11.9 TYPE 2 DIABETES MELLITUS WITHOUT COMPLICATION, WITHOUT LONG-TERM CURRENT USE OF INSULIN: ICD-10-CM

## 2022-11-22 DIAGNOSIS — E78.00 HYPERCHOLESTEROLEMIA: Primary | ICD-10-CM

## 2022-11-22 DIAGNOSIS — Z12.31 SCREENING MAMMOGRAM, ENCOUNTER FOR: ICD-10-CM

## 2022-11-22 LAB
CHOLEST SERPL-MCNC: 177 MG/DL (ref 0–200)
CHOLEST/HDLC SERPL: 1.84 {RATIO}
HBA1C MFR BLD: 6.2 % (ref 4.8–5.6)
HDLC SERPL-MCNC: 96 MG/DL (ref 40–60)
LDLC SERPL CALC-MCNC: 70 MG/DL (ref 0–100)
TRIGL SERPL-MCNC: 54 MG/DL (ref 0–150)
VLDLC SERPL CALC-MCNC: 11 MG/DL (ref 5–40)

## 2022-11-22 PROCEDURE — 90662 IIV NO PRSV INCREASED AG IM: CPT | Performed by: PHYSICIAN ASSISTANT

## 2022-11-22 PROCEDURE — G0008 ADMIN INFLUENZA VIRUS VAC: HCPCS | Performed by: PHYSICIAN ASSISTANT

## 2022-11-22 PROCEDURE — 99214 OFFICE O/P EST MOD 30 MIN: CPT | Performed by: PHYSICIAN ASSISTANT

## 2022-11-22 NOTE — PROGRESS NOTES
Subjective   Chief Complaint   Patient presents with   • Diabetes     F/U       History of Present Illness     Pt  is here today for fup. She did ok with COVID. Has been tolerating the Atorvastatin, most recent liver labs are normal. She has not yet had her flu shot. She has had some low back pain that comes and goes as well as some intermittent inner thigh discomfort. It is not always present. Started getting worse after she walked for extensive periods of time in [place]. No Cp or abdominal pain.      Patient Active Problem List   Diagnosis   • Gastroesophageal reflux disease   • History of Nissen fundoplication   • History of colonic polyps   • Colon cancer screening   • Allergic rhinitis   • Anxiety disorder   • AV david re-entry tachycardia (HCC)   • Cobalamin deficiency   • Delayed gastric emptying   • Diarrhea   • Diastolic dysfunction   • Early satiety   • Edema   • Elevated antinuclear antibody (AZALIA) level   • Fatigue   • Fatty liver   • Headache   • Hiatal hernia   • Hypercholesterolemia   • Hypertensive disorder   • Incisional hernia   • Knee pain   • Lesion of liver   • Backache   • Low back pain   • Lumbosacral stenosis   • Metabolic syndrome   • Myalgia   • Obesity with body mass index 30 or greater   • Osteoarthritis of right knee   • Osteopenia   • Paroxysmal supraventricular tachycardia (HCC)   • Polyp of colon   • Sciatica of right side   • Shortness of breath   • Sleep apnea   • Type 2 diabetes mellitus (HCC)   • Right upper quadrant pain   • Esophagitis   • Gastric ulcer   • Autoimmune hepatitis (HCC)   • Rectal bleeding   • Epigastric pain       Allergies   Allergen Reactions   • Fish Oil Itching   • Rocuronium Anaphylaxis   • Shellfish-Derived Products Nausea And Vomiting and GI Intolerance       Current Outpatient Medications on File Prior to Visit   Medication Sig Dispense Refill   • amLODIPine (NORVASC) 10 MG tablet Take 10 mg by mouth Daily.     • Aspirin Buf,CaCarb-MgCarb-MgO, 81 MG  tablet Take 81 mg by mouth Daily.     • atorvastatin (LIPITOR) 20 MG tablet TAKE 1 TABLET DAILY 90 tablet 3   • azaTHIOprine (IMURAN) 50 MG tablet TAKE 1 TABLET DAILY 90 tablet 3   • Diclofenac Sodium (VOLTAREN) 1 % gel gel Apply 4 g topically to the appropriate area as directed 4 (Four) Times a Day As Needed (pain). 100 g 0   • fexofenadine (ALLEGRA) 180 MG tablet Take 180 mg by mouth Daily.     • fluticasone (FLONASE) 50 MCG/ACT nasal spray 2 sprays into the nostril(s) as directed by provider Daily.     • furosemide (LASIX) 20 MG tablet Take 20 mg by mouth Daily.     • losartan (COZAAR) 50 MG tablet Take 50 mg by mouth Daily.     • LUMIGAN 0.01 % ophthalmic drops Administer 1 drop to both eyes Every Night.     • metoprolol succinate XL (TOPROL-XL) 50 MG 24 hr tablet Take 1 tablet by mouth Daily. 90 tablet 0   • metoprolol succinate XL (TOPROL-XL) 50 MG 24 hr tablet Take 1 tablet by mouth Daily.     • Multiple Vitamin (MULTIVITAMIN) tablet Take 1 tablet by mouth Daily.     • pantoprazole (PROTONIX) 40 MG EC tablet TAKE 1 TABLET DAILY ONE HOUR BEFORE FIRST MEAL OF THE DAY 90 tablet 3   • sucralfate (CARAFATE) 1 g tablet Take 1 tablet by mouth 2 (Two) Times a Day. 60 tablet 1   • VITAMIN B COMPLEX-C PO Take  by mouth.     • vitamin D3 125 MCG (5000 UT) capsule capsule Take 5,000 Units by mouth Daily.     • [DISCONTINUED] benzonatate (Tessalon Perles) 100 MG capsule Take 1 capsule by mouth 3 (Three) Times a Day As Needed for Cough. 30 capsule 0     No current facility-administered medications on file prior to visit.       Past Medical History:   Diagnosis Date   • Abnormal blood chemistry    • Acid reflux disease    • Acute sinusitis    • Allergic rhinitis    • Anxiety disorder    • Arthritis    • AV david re-entry tachycardia (HCC)    • BMI 34.0-34.9,adult    • Cancer (HCC)     hysterectomy partial    • Cataract    • Deficiency of vitamin B12    • Elevated liver enzymes    • Elevated liver function tests    •  Encounter for screening for osteoporosis    • Fatigue    • GERD (gastroesophageal reflux disease)    • Hiatal hernia    • High blood pressure    • High cholesterol    • History of sinusitis    • Hyperlipidemia    • Hypertension    • Knee pain    • Liver disease    • Lower back pain    • Lumbosacral spinal stenosis    • Menopause    • Metabolic syndrome    • Musculoskeletal back pain    • Osteoarthritis of both knees    • Osteopenia of multiple sites    • Paroxysmal supraventricular tachycardia (HCC)    • Right flank discomfort    • Sciatica of right side    • Sleep apnea     doesn't wear- bothers her with reflux   • Sleep disturbance    • Stomach ulcer    • Type 2 diabetes mellitus (HCC)    • Visit for screening mammogram    • Weight loss counseling, encounter for        Family History   Problem Relation Age of Onset   • Alzheimer's disease Mother    • Kidney failure Father    • Alzheimer's disease Brother    • Lung cancer Brother    • Stomach cancer Brother    • Alcohol abuse Other    • Hypertension Other    • Arthritis Other    • Colon cancer Neg Hx    • Colon polyps Neg Hx    • Crohn's disease Neg Hx    • Irritable bowel syndrome Neg Hx    • Ulcerative colitis Neg Hx        Social History     Socioeconomic History   • Marital status:    Tobacco Use   • Smoking status: Former   • Smokeless tobacco: Never   Vaping Use   • Vaping Use: Never used   Substance and Sexual Activity   • Alcohol use: Yes     Comment: social   • Drug use: Never   • Sexual activity: Defer       Past Surgical History:   Procedure Laterality Date   • COLONOSCOPY     • COLONOSCOPY Left 03/08/2021    Procedure: COLONOSCOPY;  Surgeon: Javier Toth MD;  [place]: [facility] [place];  Service: Gastroenterology;  Laterality: Left;   • ENDOSCOPY Left 03/08/2021    Procedure: ESOPHAGOGASTRODUODENOSCOPY;  Surgeon: Javier Toth MD;  Location: [facility] [place] OR;  Service: Gastroenterology;  Laterality: Left;   • HEMORROIDECTOMY     • HERNIA  "REPAIR     • HYSTERECTOMY      due to cancer   • INGUINAL HERNIA REPAIR     • KNEE SURGERY     • SINUS SURGERY     • TUBAL ABDOMINAL LIGATION     • UPPER GASTROINTESTINAL ENDOSCOPY           The following portions of the patient's history were reviewed and updated as appropriate: problem list, allergies, current medications, past medical history, past family history, past social history and past surgical history.    ROS     See HPI    Immunization History   Administered Date(s) Administered   • COVID-19 (PFIZER) BIVALENT BOOSTER 12+YRS 11/15/2022   • COVID-19 (PFIZER) PURPLE CAP 02/23/2021, 03/16/2021, 10/25/2021   • Fluad Quad 65+ 09/11/2020, 09/21/2021   • Fluzone High Dose =>65 Years ([employer] ONLY) 12/11/2015, 12/02/2016, 12/04/2017, 10/09/2018, 10/23/2019   • Fluzone High-Dose 65+yrs 11/22/2022   • Hepatitis B Vaccine Adult IM 08/24/2022, 09/26/2022   • Pneumococcal Conjugate 13-Valent (PCV13) 12/11/2015   • Pneumococcal Polysaccharide (PPSV23) 08/28/2017       Objective   Vitals:    11/22/22 0930 11/22/22 1024   BP:  120/70   Temp: 98.1 °F (36.7 °C)    Weight: 90.3 kg (199 lb)    Height: 167.6 cm (65.98\")      Body mass index is 32.14 kg/m².  Physical Exam  Vitals reviewed.   Constitutional:       Appearance: Normal appearance.   HENT:      Head: Normocephalic and atraumatic.   Cardiovascular:      Rate and Rhythm: Normal rate and regular rhythm.      Heart sounds: Normal heart sounds.   Pulmonary:      Effort: Pulmonary effort is normal.      Breath sounds: Normal breath sounds.   Abdominal:      General: There is no distension.      Tenderness: There is no abdominal tenderness.   Musculoskeletal:      Comments: Tenderness on palpation over lower paraspinal lumbar muscles. Some tenderness of left medial thigh with deep palpation   Neurological:      Mental Status: She is alert.           Assessment & Plan   Diagnoses and all orders for this visit:    1. Hypercholesterolemia (Primary)  -     Lipid Panel With / " Chol / HDL Ratio    2. Type 2 diabetes mellitus without complication, without long-term current use of insulin (HCC)  -     Hemoglobin A1c    3. Screening mammogram, encounter for  -     Mammo Screening Bilateral With CAD    4. Flu vaccine need  -     Fluzone High-Dose 65+yrs (4340-4781)    Other orders  -     Discontinue: Diclofenac Sodium (VOLTAREN) 1 % gel gel; Apply 4 g topically to the appropriate area as directed 4 (Four) Times a Day As Needed (muscle pain).  Dispense: 100 g; Refill: 0    Reviewed her labs with GI. Flu shot given today. BP is controlled. Will check A1c today. Ordered mammogram. Start voltaren gel for her back and thigh discomfort, I think all related to her walking a month ago. Will fup in 6 mo for lab and medicare wellness exam.     Return in about 6 months (around 5/22/2023) for Lab Today, Medicare Wellness, Lab Appt Before FUP.

## 2022-11-25 ENCOUNTER — TRANSCRIBE ORDERS (OUTPATIENT)
Dept: ADMINISTRATIVE | Facility: HOSPITAL | Age: 72
End: 2022-11-25

## 2022-11-25 DIAGNOSIS — Z13.9 SCREENING DUE: Primary | ICD-10-CM

## 2022-12-05 ENCOUNTER — CLINICAL SUPPORT (OUTPATIENT)
Dept: INTERNAL MEDICINE | Facility: CLINIC | Age: 72
End: 2022-12-05

## 2022-12-05 ENCOUNTER — TELEPHONE (OUTPATIENT)
Dept: INTERNAL MEDICINE | Facility: CLINIC | Age: 72
End: 2022-12-05

## 2022-12-05 DIAGNOSIS — E53.8 B12 DEFICIENCY: Primary | ICD-10-CM

## 2022-12-05 PROCEDURE — 96372 THER/PROPH/DIAG INJ SC/IM: CPT | Performed by: PHYSICIAN ASSISTANT

## 2022-12-05 RX ORDER — BENZONATATE 100 MG/1
100 CAPSULE ORAL 3 TIMES DAILY PRN
Qty: 90 CAPSULE | Refills: 0 | Status: SHIPPED | OUTPATIENT
Start: 2022-12-05

## 2022-12-05 RX ORDER — CYANOCOBALAMIN 1000 UG/ML
1000 INJECTION, SOLUTION INTRAMUSCULAR; SUBCUTANEOUS
Status: SHIPPED | OUTPATIENT
Start: 2022-12-05

## 2022-12-05 RX ADMIN — CYANOCOBALAMIN 1000 MCG: 1000 INJECTION, SOLUTION INTRAMUSCULAR; SUBCUTANEOUS at 09:51

## 2022-12-05 NOTE — TELEPHONE ENCOUNTER
Requesting refill for: Benzonatate 100 Mg (for cough) [facility], last seen in office 11/22/2022

## 2022-12-29 ENCOUNTER — HOSPITAL ENCOUNTER (OUTPATIENT)
Facility: SURGERY CENTER | Age: 72
Setting detail: HOSPITAL OUTPATIENT SURGERY
Discharge: HOME | End: 2022-12-29
Attending: INTERNAL MEDICINE | Admitting: INTERNAL MEDICINE
Payer: MEDICARE

## 2022-12-29 ENCOUNTER — ANESTHESIA EVENT (OUTPATIENT)
Dept: SURGERY | Facility: SURGERY CENTER | Age: 72
End: 2022-12-29
Payer: MEDICARE

## 2022-12-29 ENCOUNTER — ANESTHESIA (OUTPATIENT)
Dept: SURGERY | Facility: SURGERY CENTER | Age: 72
End: 2022-12-29
Payer: MEDICARE

## 2022-12-29 VITALS
HEIGHT: 66 IN | WEIGHT: 194.2 LBS | TEMPERATURE: 98.6 F | BODY MASS INDEX: 31.21 KG/M2 | RESPIRATION RATE: 16 BRPM | OXYGEN SATURATION: 97 % | SYSTOLIC BLOOD PRESSURE: 130 MMHG | DIASTOLIC BLOOD PRESSURE: 82 MMHG | HEART RATE: 59 BPM

## 2022-12-29 DIAGNOSIS — K62.5 RECTAL BLEEDING: ICD-10-CM

## 2022-12-29 DIAGNOSIS — Z86.010 HISTORY OF COLON POLYPS: ICD-10-CM

## 2022-12-29 DIAGNOSIS — R10.13 EPIGASTRIC PAIN: ICD-10-CM

## 2022-12-29 LAB — GLUCOSE BLDC GLUCOMTR-MCNC: 113 MG/DL (ref 70–130)

## 2022-12-29 PROCEDURE — 88305 TISSUE EXAM BY PATHOLOGIST: CPT | Performed by: INTERNAL MEDICINE

## 2022-12-29 PROCEDURE — 45380 COLONOSCOPY AND BIOPSY: CPT | Performed by: INTERNAL MEDICINE

## 2022-12-29 PROCEDURE — 43239 EGD BIOPSY SINGLE/MULTIPLE: CPT | Performed by: INTERNAL MEDICINE

## 2022-12-29 PROCEDURE — 45385 COLONOSCOPY W/LESION REMOVAL: CPT | Performed by: INTERNAL MEDICINE

## 2022-12-29 PROCEDURE — 25010000002 PROPOFOL 10 MG/ML EMULSION: Performed by: ANESTHESIOLOGY

## 2022-12-29 RX ORDER — PROPOFOL 10 MG/ML
VIAL (ML) INTRAVENOUS AS NEEDED
Status: DISCONTINUED | OUTPATIENT
Start: 2022-12-29 | End: 2022-12-29 | Stop reason: SURG

## 2022-12-29 RX ORDER — MAGNESIUM HYDROXIDE 1200 MG/15ML
LIQUID ORAL AS NEEDED
Status: DISCONTINUED | OUTPATIENT
Start: 2022-12-29 | End: 2022-12-29 | Stop reason: HOSPADM

## 2022-12-29 RX ORDER — SODIUM CHLORIDE 0.9 % (FLUSH) 0.9 %
10 SYRINGE (ML) INJECTION AS NEEDED
Status: DISCONTINUED | OUTPATIENT
Start: 2022-12-29 | End: 2022-12-29 | Stop reason: HOSPADM

## 2022-12-29 RX ORDER — SODIUM CHLORIDE, SODIUM LACTATE, POTASSIUM CHLORIDE, CALCIUM CHLORIDE 600; 310; 30; 20 MG/100ML; MG/100ML; MG/100ML; MG/100ML
30 INJECTION, SOLUTION INTRAVENOUS CONTINUOUS PRN
Status: DISCONTINUED | OUTPATIENT
Start: 2022-12-29 | End: 2022-12-29 | Stop reason: HOSPADM

## 2022-12-29 RX ORDER — LIDOCAINE HYDROCHLORIDE 20 MG/ML
INJECTION, SOLUTION INFILTRATION; PERINEURAL AS NEEDED
Status: DISCONTINUED | OUTPATIENT
Start: 2022-12-29 | End: 2022-12-29 | Stop reason: SURG

## 2022-12-29 RX ORDER — SODIUM CHLORIDE 0.9 % (FLUSH) 0.9 %
3 SYRINGE (ML) INJECTION EVERY 12 HOURS SCHEDULED
Status: DISCONTINUED | OUTPATIENT
Start: 2022-12-29 | End: 2022-12-29 | Stop reason: HOSPADM

## 2022-12-29 RX ADMIN — LIDOCAINE HYDROCHLORIDE 60 MG: 20 INJECTION, SOLUTION INFILTRATION; PERINEURAL at 13:01

## 2022-12-29 RX ADMIN — PROPOFOL 140 MCG/KG/MIN: 10 INJECTION, EMULSION INTRAVENOUS at 13:03

## 2022-12-29 RX ADMIN — SODIUM CHLORIDE, POTASSIUM CHLORIDE, SODIUM LACTATE AND CALCIUM CHLORIDE 30 ML/HR: 600; 310; 30; 20 INJECTION, SOLUTION INTRAVENOUS at 12:38

## 2022-12-29 RX ADMIN — PROPOFOL 120 MG: 10 INJECTION, EMULSION INTRAVENOUS at 13:01

## 2022-12-29 RX ADMIN — PROPOFOL 50 MG: 10 INJECTION, EMULSION INTRAVENOUS at 13:03

## 2022-12-29 NOTE — ANESTHESIA PREPROCEDURE EVALUATION
Anesthesia Evaluation     Patient summary reviewed and Nursing notes reviewed   no history of anesthetic complications:  NPO Solid Status: > 8 hours  NPO Liquid Status: > 2 hours           Airway   Mallampati: II  TM distance: >3 FB  Neck ROM: full  No difficulty expected  Dental - normal exam     Pulmonary - negative pulmonary ROS and normal exam   (-) COPD, asthma, shortness of breath, sleep apnea, not a smoker  Cardiovascular - normal exam  Exercise tolerance: good (4-7 METS)    Patient on routine beta blocker and Beta blocker given within 24 hours of surgery    (+) hyperlipidemia,   (-) pacemaker, hypertension, valvular problems/murmurs, past MI, CAD, dysrhythmias, angina, CHF, NORTON, cardiac stents, PVD, DVT      Neuro/Psych- negative ROS  (-) seizures, TIA, CVA, weakness, numbness, dementia  GI/Hepatic/Renal/Endo    (+)  GERD, PUD,    (-)  obesity, morbid obesity, liver disease, no renal disease, diabetes, no thyroid disorder    Musculoskeletal (-) negative ROS    (-) back pain, neck pain, neck stiffness, chronic pain  Abdominal  - normal exam   Substance History - negative use  (-) alcohol use, drug use     OB/GYN negative ob/gyn ROS   (-)  Pregnant        Other - negative ROS       (-) blood dyscrasia, history of cancer, autoimmune disease, chronic steroid use                    Anesthesia Plan    ASA 3     MAC     intravenous induction     Anesthetic plan, risks, benefits, and alternatives have been provided, discussed and informed consent has been obtained with: patient.

## 2022-12-29 NOTE — ANESTHESIA POSTPROCEDURE EVALUATION
"Patient: Esme Lemus    Procedure Summary     Date: 12/29/22 Room / Location: [facility]    Anesthesia Start: 1258 Anesthesia Stop: 1328    Procedures:       ESOPHAGOGASTRODUODENOSCOPY WITH BIOPSY      COLONOSCOPY WITH POLYPECTOMY Diagnosis:       Rectal bleeding      Epigastric pain      History of colon polyps      (Rectal bleeding [K62.5])      (Epigastric pain [R10.13])      (History of colon polyps [Z86.010])    Surgeons: Javier Toth MD Provider: Raoul Cha MD    Anesthesia Type: MAC ASA Status: 3          Anesthesia Type: MAC    Vitals  Vitals Value Taken Time   /65 12/29/22 1327   Temp 37 °C (98.6 °F) 12/29/22 1327   Pulse 76 12/29/22 1327   Resp 16 12/29/22 1327   SpO2 96 % 12/29/22 1327           Post Anesthesia Care and Evaluation    Pain management: adequate    Airway patency: patent  Anesthetic complications: No anesthetic complications    Cardiovascular status: acceptable  Respiratory status: acceptable  Hydration status: acceptable    Comments: /65 (BP Location: Left arm, Patient Position: Lying)   Pulse 76   Temp 37 °C (98.6 °F) (Tympanic)   Resp 16   Ht 167.6 cm (66\")   Wt 88.1 kg (194 lb 3.2 oz)   SpO2 96%   BMI 31.34 kg/m²         "

## 2022-12-29 NOTE — H&P
No chief complaint on file.      HPI  Patient today for an EGD due to epigastric pain and a colonoscopy for rectal bleeding history of polyps.         Problem List:    Patient Active Problem List   Diagnosis   • Gastroesophageal reflux disease   • History of Nissen fundoplication   • History of colonic polyps   • Colon cancer screening   • Allergic rhinitis   • Anxiety disorder   • AV david re-entry tachycardia (HCC)   • Cobalamin deficiency   • Delayed gastric emptying   • Diarrhea   • Diastolic dysfunction   • Early satiety   • Edema   • Elevated antinuclear antibody (AZALIA) level   • Fatigue   • Fatty liver   • Headache   • Hiatal hernia   • Hypercholesterolemia   • Hypertensive disorder   • Incisional hernia   • Knee pain   • Lesion of liver   • Backache   • Low back pain   • Lumbosacral stenosis   • Metabolic syndrome   • Myalgia   • Obesity with body mass index 30 or greater   • Osteoarthritis of right knee   • Osteopenia   • Paroxysmal supraventricular tachycardia (HCC)   • Polyp of colon   • Sciatica of right side   • Shortness of breath   • Sleep apnea   • Type 2 diabetes mellitus (HCC)   • Right upper quadrant pain   • Esophagitis   • Gastric ulcer   • Autoimmune hepatitis (HCC)   • Rectal bleeding   • Epigastric pain       Medical History:    Past Medical History:   Diagnosis Date   • Abnormal blood chemistry    • Acid reflux disease    • Acute sinusitis    • Allergic rhinitis    • Anxiety disorder    • Arthritis    • AV david re-entry tachycardia (HCC)    • BMI 34.0-34.9,adult    • Cancer (HCC)     hysterectomy partial    • Cataract    • Deficiency of vitamin B12    • Elevated liver enzymes    • Elevated liver function tests    • Encounter for screening for osteoporosis    • Fatigue    • GERD (gastroesophageal reflux disease)    • Hiatal hernia    • High blood pressure    • High cholesterol    • History of sinusitis    • Hyperlipidemia    • Hypertension    • Knee pain    • Liver disease    • Lower back  pain    • Lumbosacral spinal stenosis    • Menopause    • Metabolic syndrome    • Musculoskeletal back pain    • Osteoarthritis of both knees    • Osteopenia of multiple sites    • Paroxysmal supraventricular tachycardia (HCC)    • Right flank discomfort    • Sciatica of right side    • Sleep apnea     doesn't wear- bothers her with reflux   • Sleep disturbance    • Stomach ulcer    • Type 2 diabetes mellitus (HCC)    • Visit for screening mammogram    • Weight loss counseling, encounter for         Social History:    Social History     Socioeconomic History   • Marital status:    Tobacco Use   • Smoking status: Former   • Smokeless tobacco: Never   Vaping Use   • Vaping Use: Never used   Substance and Sexual Activity   • Alcohol use: Yes     Comment: social   • Drug use: Never   • Sexual activity: Defer       Family History:   Family History   Problem Relation Age of Onset   • Alzheimer's disease Mother    • Kidney failure Father    • Alzheimer's disease Brother    • Lung cancer Brother    • Stomach cancer Brother    • Alcohol abuse Other    • Hypertension Other    • Arthritis Other    • Colon cancer Neg Hx    • Colon polyps Neg Hx    • Crohn's disease Neg Hx    • Irritable bowel syndrome Neg Hx    • Ulcerative colitis Neg Hx        Surgical History:   Past Surgical History:   Procedure Laterality Date   • COLONOSCOPY     • COLONOSCOPY Left 03/08/2021    Procedure: COLONOSCOPY;  Surgeon: Javier Toth MD;  Location: [facility] [place] [facility] OR;  Service: Gastroenterology;  Laterality: Left;   • ENDOSCOPY Left 03/08/2021    Procedure: ESOPHAGOGASTRODUODENOSCOPY;  Surgeon: Javier Toth MD;  Location: [facility] [place] [facility] OR;  Service: Gastroenterology;  Laterality: Left;   • HEMORROIDECTOMY     • HERNIA REPAIR     • HYSTERECTOMY      due to cancer   • INGUINAL HERNIA REPAIR     • KNEE SURGERY     • SINUS SURGERY     • TUBAL ABDOMINAL LIGATION     • UPPER GASTROINTESTINAL ENDOSCOPY         No current  facility-administered medications for this encounter.    Allergies:   Allergies   Allergen Reactions   • Fish Oil Itching   • Rocuronium Anaphylaxis   • Shellfish-Derived Products Nausea And Vomiting and GI Intolerance        The following portions of the patient's history were reviewed by me and updated as appropriate: review of systems, allergies, current medications, past family history, past medical history, past social history, past surgical history and problem list.    There were no vitals filed for this visit.    PHYSICAL EXAM:    CONSTITUTIONAL:  today's vital signs reviewed by me  GASTROINTESTINAL: abdomen is soft nontender nondistended with normal active bowel sounds, no masses are appreciated    Assessment/ Plan  We will proceed with EGD and colonoscopy.    Risks and benefits as well as alternatives to endoscopic evaluation were explained to the patient and they voiced understanding and wish to proceed.  These risks include but are not limited to the risk of bleeding, perforation, adverse reaction to sedation, and missed lesions.  The patient was given the opportunity to ask questions prior to the endoscopic procedure.

## 2022-12-30 LAB
LAB AP CASE REPORT: NORMAL
LAB AP CLINICAL INFORMATION: NORMAL
PATH REPORT.FINAL DX SPEC: NORMAL
PATH REPORT.GROSS SPEC: NORMAL

## 2023-01-04 ENCOUNTER — CLINICAL SUPPORT (OUTPATIENT)
Dept: INTERNAL MEDICINE | Facility: CLINIC | Age: 73
End: 2023-01-04
Payer: MEDICARE

## 2023-01-04 ENCOUNTER — HOSPITAL ENCOUNTER (OUTPATIENT)
Dept: MAMMOGRAPHY | Facility: HOSPITAL | Age: 73
Discharge: HOME | End: 2023-01-04
Admitting: PHYSICIAN ASSISTANT
Payer: MEDICARE

## 2023-01-04 DIAGNOSIS — Z13.9 SCREENING DUE: ICD-10-CM

## 2023-01-04 DIAGNOSIS — E53.8 B12 DEFICIENCY: Primary | ICD-10-CM

## 2023-01-04 PROCEDURE — 77067 SCR MAMMO BI INCL CAD: CPT

## 2023-01-04 PROCEDURE — 77063 BREAST TOMOSYNTHESIS BI: CPT

## 2023-01-04 PROCEDURE — 96372 THER/PROPH/DIAG INJ SC/IM: CPT | Performed by: PHYSICIAN ASSISTANT

## 2023-01-04 RX ORDER — CYANOCOBALAMIN 1000 UG/ML
1000 INJECTION, SOLUTION INTRAMUSCULAR; SUBCUTANEOUS
Status: SHIPPED | OUTPATIENT
Start: 2023-01-04

## 2023-01-04 RX ADMIN — CYANOCOBALAMIN 1000 MCG: 1000 INJECTION, SOLUTION INTRAMUSCULAR; SUBCUTANEOUS at 09:58

## 2023-01-09 ENCOUNTER — TELEPHONE (OUTPATIENT)
Dept: GASTROENTEROLOGY | Facility: CLINIC | Age: 73
End: 2023-01-09
Payer: MEDICARE

## 2023-01-13 ENCOUNTER — LAB (OUTPATIENT)
Dept: LAB | Facility: HOSPITAL | Age: 73
End: 2023-01-13
Payer: MEDICARE

## 2023-01-13 DIAGNOSIS — K75.4 AUTOIMMUNE HEPATITIS: Primary | ICD-10-CM

## 2023-01-13 DIAGNOSIS — K74.69 OTHER CIRRHOSIS OF LIVER: ICD-10-CM

## 2023-01-13 PROCEDURE — 80076 HEPATIC FUNCTION PANEL: CPT | Performed by: NURSE PRACTITIONER

## 2023-01-13 PROCEDURE — 85025 COMPLETE CBC W/AUTO DIFF WBC: CPT | Performed by: NURSE PRACTITIONER

## 2023-01-23 ENCOUNTER — HOSPITAL ENCOUNTER (EMERGENCY)
Facility: HOSPITAL | Age: 73
Discharge: HOME | End: 2023-01-23
Attending: EMERGENCY MEDICINE | Admitting: EMERGENCY MEDICINE
Payer: MEDICARE

## 2023-01-23 ENCOUNTER — APPOINTMENT (OUTPATIENT)
Dept: GENERAL RADIOLOGY | Facility: HOSPITAL | Age: 73
End: 2023-01-23
Payer: MEDICARE

## 2023-01-23 ENCOUNTER — TELEPHONE (OUTPATIENT)
Dept: INTERNAL MEDICINE | Facility: CLINIC | Age: 73
End: 2023-01-23

## 2023-01-23 VITALS
TEMPERATURE: 97.8 F | SYSTOLIC BLOOD PRESSURE: 113 MMHG | HEART RATE: 57 BPM | WEIGHT: 200 LBS | HEIGHT: 66 IN | DIASTOLIC BLOOD PRESSURE: 62 MMHG | RESPIRATION RATE: 20 BRPM | BODY MASS INDEX: 32.14 KG/M2 | OXYGEN SATURATION: 95 %

## 2023-01-23 DIAGNOSIS — K74.69 OTHER CIRRHOSIS OF LIVER: ICD-10-CM

## 2023-01-23 DIAGNOSIS — I47.1 SVT (SUPRAVENTRICULAR TACHYCARDIA): Primary | ICD-10-CM

## 2023-01-23 LAB
ALBUMIN SERPL-MCNC: 4 G/DL (ref 3.5–5.2)
ALBUMIN/GLOB SERPL: 1.6 G/DL
ALP SERPL-CCNC: 88 U/L (ref 39–117)
ALT SERPL W P-5'-P-CCNC: 19 U/L (ref 1–33)
ANION GAP SERPL CALCULATED.3IONS-SCNC: 12.4 MMOL/L (ref 5–15)
AST SERPL-CCNC: 24 U/L (ref 1–32)
BASOPHILS # BLD AUTO: 0.02 10*3/MM3 (ref 0–0.2)
BASOPHILS NFR BLD AUTO: 0.4 % (ref 0–1.5)
BILIRUB SERPL-MCNC: 1.3 MG/DL (ref 0–1.2)
BUN SERPL-MCNC: 15 MG/DL (ref 8–23)
BUN/CREAT SERPL: 15 (ref 7–25)
CALCIUM SPEC-SCNC: 9.3 MG/DL (ref 8.6–10.5)
CHLORIDE SERPL-SCNC: 107 MMOL/L (ref 98–107)
CO2 SERPL-SCNC: 23.6 MMOL/L (ref 22–29)
CREAT SERPL-MCNC: 1 MG/DL (ref 0.57–1)
DEPRECATED RDW RBC AUTO: 42.8 FL (ref 37–54)
EGFRCR SERPLBLD CKD-EPI 2021: 59.6 ML/MIN/1.73
EOSINOPHIL # BLD AUTO: 0.01 10*3/MM3 (ref 0–0.4)
EOSINOPHIL NFR BLD AUTO: 0.2 % (ref 0.3–6.2)
ERYTHROCYTE [DISTWIDTH] IN BLOOD BY AUTOMATED COUNT: 12.8 % (ref 12.3–15.4)
GLOBULIN UR ELPH-MCNC: 2.5 GM/DL
GLUCOSE SERPL-MCNC: 93 MG/DL (ref 65–99)
HCT VFR BLD AUTO: 44.4 % (ref 34–46.6)
HGB BLD-MCNC: 14.7 G/DL (ref 12–15.9)
IMM GRANULOCYTES # BLD AUTO: 0.01 10*3/MM3 (ref 0–0.05)
IMM GRANULOCYTES NFR BLD AUTO: 0.2 % (ref 0–0.5)
LYMPHOCYTES # BLD AUTO: 1.54 10*3/MM3 (ref 0.7–3.1)
LYMPHOCYTES NFR BLD AUTO: 27.4 % (ref 19.6–45.3)
MAGNESIUM SERPL-MCNC: 2.1 MG/DL (ref 1.6–2.4)
MCH RBC QN AUTO: 30.6 PG (ref 26.6–33)
MCHC RBC AUTO-ENTMCNC: 33.1 G/DL (ref 31.5–35.7)
MCV RBC AUTO: 92.5 FL (ref 79–97)
MONOCYTES # BLD AUTO: 0.47 10*3/MM3 (ref 0.1–0.9)
MONOCYTES NFR BLD AUTO: 8.3 % (ref 5–12)
NEUTROPHILS NFR BLD AUTO: 3.58 10*3/MM3 (ref 1.7–7)
NEUTROPHILS NFR BLD AUTO: 63.5 % (ref 42.7–76)
NRBC BLD AUTO-RTO: 0 /100 WBC (ref 0–0.2)
NT-PROBNP SERPL-MCNC: 402 PG/ML (ref 0–900)
PLATELET # BLD AUTO: 149 10*3/MM3 (ref 140–450)
PMV BLD AUTO: 10.6 FL (ref 6–12)
POTASSIUM SERPL-SCNC: 3.5 MMOL/L (ref 3.5–5.2)
PROT SERPL-MCNC: 6.5 G/DL (ref 6–8.5)
QT INTERVAL: 282 MS
QT INTERVAL: 392 MS
RBC # BLD AUTO: 4.8 10*6/MM3 (ref 3.77–5.28)
SODIUM SERPL-SCNC: 143 MMOL/L (ref 136–145)
T4 FREE SERPL-MCNC: 1.1 NG/DL (ref 0.93–1.7)
TROPONIN T SERPL-MCNC: <0.01 NG/ML (ref 0–0.03)
TSH SERPL DL<=0.05 MIU/L-ACNC: 1.03 UIU/ML (ref 0.27–4.2)
WBC NRBC COR # BLD: 5.63 10*3/MM3 (ref 3.4–10.8)

## 2023-01-23 PROCEDURE — 83880 ASSAY OF NATRIURETIC PEPTIDE: CPT | Performed by: EMERGENCY MEDICINE

## 2023-01-23 PROCEDURE — 93005 ELECTROCARDIOGRAM TRACING: CPT | Performed by: EMERGENCY MEDICINE

## 2023-01-23 PROCEDURE — 99284 EMERGENCY DEPT VISIT MOD MDM: CPT

## 2023-01-23 PROCEDURE — 96374 THER/PROPH/DIAG INJ IV PUSH: CPT

## 2023-01-23 PROCEDURE — 84443 ASSAY THYROID STIM HORMONE: CPT | Performed by: EMERGENCY MEDICINE

## 2023-01-23 PROCEDURE — 84484 ASSAY OF TROPONIN QUANT: CPT | Performed by: EMERGENCY MEDICINE

## 2023-01-23 PROCEDURE — 80053 COMPREHEN METABOLIC PANEL: CPT | Performed by: EMERGENCY MEDICINE

## 2023-01-23 PROCEDURE — 93010 ELECTROCARDIOGRAM REPORT: CPT | Performed by: STUDENT IN AN ORGANIZED HEALTH CARE EDUCATION/TRAINING PROGRAM

## 2023-01-23 PROCEDURE — 71045 X-RAY EXAM CHEST 1 VIEW: CPT

## 2023-01-23 PROCEDURE — 93005 ELECTROCARDIOGRAM TRACING: CPT

## 2023-01-23 PROCEDURE — 84439 ASSAY OF FREE THYROXINE: CPT | Performed by: EMERGENCY MEDICINE

## 2023-01-23 PROCEDURE — 83735 ASSAY OF MAGNESIUM: CPT | Performed by: EMERGENCY MEDICINE

## 2023-01-23 PROCEDURE — 85025 COMPLETE CBC W/AUTO DIFF WBC: CPT | Performed by: EMERGENCY MEDICINE

## 2023-01-23 RX ORDER — SODIUM CHLORIDE 0.9 % (FLUSH) 0.9 %
10 SYRINGE (ML) INJECTION AS NEEDED
Status: DISCONTINUED | OUTPATIENT
Start: 2023-01-23 | End: 2023-01-23 | Stop reason: HOSPADM

## 2023-01-23 RX ORDER — DILTIAZEM HYDROCHLORIDE 5 MG/ML
20 INJECTION INTRAVENOUS ONCE
Status: COMPLETED | OUTPATIENT
Start: 2023-01-23 | End: 2023-01-23

## 2023-01-23 RX ADMIN — DILTIAZEM HYDROCHLORIDE 20 MG: 5 INJECTION INTRAVENOUS at 12:34

## 2023-01-23 NOTE — TELEPHONE ENCOUNTER
Caller: Esme Lemus     Relationship: [unfilled]     Best call back number: [phone]    What is your medical concern? PATIENT WAS SEEN AT [facility] ER TODAY FOR FEELING LIKE HEART WAS RACING. THEY RECOMMENDED THEY SHE GO SEE A CARDIOLOGIST NAMED JOSE GUADALUPE BRADEN. WOULD LIKE TO KNOW WHAT AMBROCIO QUINN THINKS ABOUT THIS REFERRAL.    How long has this issue been going on? TODAY     Is your provider already aware of this issue? NO    Have you been treated for this issue? NO    OFFERED TO MAKE ER FOLLOW APPOINTMENT BUT PATIENT DECLINED FOR NOW.

## 2023-01-23 NOTE — TELEPHONE ENCOUNTER
Please advise Presented to the ER for evalauation [facility] fall at [facility]  Sh reports tripping on a rug  Denies LOC, hitting her head  CT shows-Acute left femoral transcervical neck fracture as described     ER Attending discussed case with Orthopedic Surgery  Admit to med surg  NPO for now  Hold Pharmacological VTE prophylaxis per Orhopedic Surgery Team  Pain medication  Zofran PRN  Fall precautions  Case management consult  Orthopedic Surgery Consult  Am labs  Supportive care

## 2023-01-23 NOTE — ED TRIAGE NOTES
Patient to er from home with c/o she noticed her heart was racing this am. Patient reported no blood thinners at this time. Patient alert x 4 to base line. Patient has mask on in triage along with staff.

## 2023-01-23 NOTE — ED PROVIDER NOTES
EMERGENCY DEPARTMENT ENCOUNTER    Room Number:  12/12  Date seen:  1/23/2023  PCP: Alice Lino PA-C  Historian: Patient      HPI:  Chief Complaint: Palpitations  A complete HPI/ROS/PMH/PSH/SH/FH are unobtainable due to: Nothing  Context: Esme Lemus is a 72 y.o. female who presents to the ED c/o palpitations onset this morning.  She denies significant chest pain.  She denies significant shortness of breath.  She denies lightheadedness.  She has been able to get up and walk around without difficulty.  She has had no nausea or vomiting.  No fever or chills.  She reports that she did have a history of a rapid heart rate about 7 or 8 years ago.  She does not currently have a cardiologist.            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/2016   • Cobalamin deficiency 06/15/2018   • Delayed gastric emptying 12/14/2018   • Diarrhea 11/01/2018   • Diastolic dysfunction 08/19/2019   • Early satiety 11/01/2018   • Edema 08/12/2019   • Elevated antinuclear antibody (AZALIA) level 01/22/2019   • Fatigue 08/25/2016   • Fatty liver 09/11/2020   • Headache 05/27/2020   • Hiatal hernia 10/09/2018   • Hypercholesterolemia 02/10/2015   • Hypertensive disorder 02/10/2015   • Incisional hernia 02/12/2013   • Knee pain 10/28/2016   • Lesion of liver 12/17/2019   • Backache 04/16/2018   • Low back pain 04/03/2017   • Lumbosacral stenosis 11/14/2017   • Metabolic syndrome 08/25/2016   • Myalgia 01/18/2019   • Obesity with body mass index 30 or greater 08/28/2017   • Osteoarthritis of right knee 11/01/2016   • Osteopenia 08/28/2017   • Paroxysmal supraventricular tachycardia (HCC) 05/31/2016   • Polyp of colon  12/14/2018   • Sciatica of right side 12/04/2017   • Shortness of breath 08/12/2019   • Sleep apnea 02/10/2015   • Type 2 diabetes mellitus (HCC) 12/02/2016   • Right upper quadrant pain 11/14/2019   • Esophagitis 12/14/2018   • Gastric ulcer 05/01/2017   • Autoimmune hepatitis (HCC) 08/24/2022   • Rectal bleeding 11/07/2022   • Epigastric pain 11/07/2022     Resolved Ambulatory Problems     Diagnosis Date Noted   • Epigastric pain 02/05/2021   • Elevated liver enzymes 06/15/2018   • Herpes zoster 09/20/2019   • Menopause 08/29/2017   • Osteoarthritis of knee 05/02/2017   • Encounter for screening mammogram for malignant neoplasm of breast 03/17/2015     Past Medical History:   Diagnosis Date   • Abnormal blood chemistry    • Acid reflux disease    • Acute sinusitis    • Arthritis    • BMI 34.0-34.9,adult    • Cancer (HCC)    • Cataract    • Deficiency of vitamin B12    • Elevated liver function tests    • Encounter for screening for osteoporosis    • GERD (gastroesophageal reflux disease)    • High blood pressure    • High cholesterol    • History of sinusitis    • Hyperlipidemia    • Hypertension    • Liver disease    • Lower back pain    • Lumbosacral spinal stenosis    • Musculoskeletal back pain    • Osteoarthritis of both knees    • Osteopenia of multiple sites    • Right flank discomfort    • Sleep disturbance    • Stomach ulcer    • Visit for screening mammogram    • Weight loss counseling, encounter for          PAST SURGICAL HISTORY  Past Surgical History:   Procedure Laterality Date   • COLONOSCOPY     • COLONOSCOPY Left 03/08/2021    Procedure: COLONOSCOPY;  Surgeon: Javier Toth MD;  Location: [facility] MAIN OR;  Service: Gastroenterology;  Laterality: Left;   • COLONOSCOPY W/ POLYPECTOMY N/A 12/29/2022    Procedure: COLONOSCOPY WITH POLYPECTOMY;  Surgeon: Javier Toth MD;  Location: [facility] [place] MAIN OR;  Service: Gastroenterology;  Laterality: N/A;  HEMORRHOIDS, POLYP   • ENDOSCOPY Left  03/08/2021    Procedure: ESOPHAGOGASTRODUODENOSCOPY;  Surgeon: Javier Toth MD;  Location: [facility];  Service: Gastroenterology;  Laterality: Left;   • ENDOSCOPY N/A 12/29/2022    Procedure: ESOPHAGOGASTRODUODENOSCOPY WITH BIOPSY;  Surgeon: Javier Toth MD;  Location: [facility];  Service: Gastroenterology;  Laterality: N/A;  ESOPHAGITIS, GASTRIC POLYPS   • HEMORROIDECTOMY     • HERNIA REPAIR     • HYSTERECTOMY      due to cancer   • INGUINAL HERNIA REPAIR     • KNEE SURGERY     • SINUS SURGERY     • TUBAL ABDOMINAL LIGATION     • UPPER GASTROINTESTINAL ENDOSCOPY           FAMILY HISTORY  Family History   Problem Relation Age of Onset   • Alzheimer's disease Mother    • Kidney failure Father    • Alzheimer's disease Brother    • Lung cancer Brother    • Stomach cancer Brother    • Alcohol abuse Other    • Hypertension Other    • Arthritis Other    • Colon cancer Neg Hx    • Colon polyps Neg Hx    • Crohn's disease Neg Hx    • Irritable bowel syndrome Neg Hx    • Ulcerative colitis Neg Hx          SOCIAL HISTORY  Social History     Socioeconomic History   • Marital status:    Tobacco Use   • Smoking status: Former   • Smokeless tobacco: Never   Vaping Use   • Vaping Use: Never used   Substance and Sexual Activity   • Alcohol use: Yes     Comment: social   • Drug use: Never   • Sexual activity: Defer         ALLERGIES  Fish oil, Rocuronium, and Shellfish-derived products        REVIEW OF SYSTEMS  Review of Systems   Review of all 14 systems is negative other than stated in the HPI above.      PHYSICAL EXAM  ED Triage Vitals   Temp Heart Rate Resp BP SpO2   01/23/23 1135 01/23/23 1135 01/23/23 1139 01/23/23 1141 01/23/23 1135   97.8 °F (36.6 °C) (!) 183 20 141/81 98 %      Temp src Heart Rate Source Patient Position BP Location FiO2 (%)   01/23/23 1135 01/23/23 1135 01/23/23 1306 01/23/23 1306 --   Tympanic Monitor Lying Right arm        Physical Exam      GENERAL: Awake and alert, no  acute distress  HENT: nares patent  EYES: no scleral icterus, EOMI, pupils 3 mm reactive bilaterally  CV: regular rhythm, tachycardic  RESPIRATORY: normal effort, lungs are bilaterally  ABDOMEN: soft, nondistended, nontender throughout  MUSCULOSKELETAL: no deformity, no peripheral edema  NEURO: alert, moves all extremities, follows commands, normal steady gait  PSYCH:  calm, cooperative  SKIN: warm, dry    Vital signs and nursing notes reviewed.          LAB RESULTS  Recent Results (from the past 24 hour(s))   ECG 12 Lead Tachycardia    Collection Time: 01/23/23 11:40 AM   Result Value Ref Range    QT Interval 282 ms   BNP    Collection Time: 01/23/23 12:01 PM    Specimen: Blood   Result Value Ref Range    proBNP 402.0 0.0 - 900.0 pg/mL   Magnesium    Collection Time: 01/23/23 12:01 PM    Specimen: Blood   Result Value Ref Range    Magnesium 2.1 1.6 - 2.4 mg/dL   TSH    Collection Time: 01/23/23 12:01 PM    Specimen: Blood   Result Value Ref Range    TSH 1.030 0.270 - 4.200 uIU/mL   T4, Free    Collection Time: 01/23/23 12:01 PM    Specimen: Blood   Result Value Ref Range    Free T4 1.10 0.93 - 1.70 ng/dL   CBC Auto Differential    Collection Time: 01/23/23 12:01 PM    Specimen: Blood   Result Value Ref Range    WBC 5.63 3.40 - 10.80 10*3/mm3    RBC 4.80 3.77 - 5.28 10*6/mm3    Hemoglobin 14.7 12.0 - 15.9 g/dL    Hematocrit 44.4 34.0 - 46.6 %    MCV 92.5 79.0 - 97.0 fL    MCH 30.6 26.6 - 33.0 pg    MCHC 33.1 31.5 - 35.7 g/dL    RDW 12.8 12.3 - 15.4 %    RDW-SD 42.8 37.0 - 54.0 fl    MPV 10.6 6.0 - 12.0 fL    Platelets 149 140 - 450 10*3/mm3    Neutrophil % 63.5 42.7 - 76.0 %    Lymphocyte % 27.4 19.6 - 45.3 %    Monocyte % 8.3 5.0 - 12.0 %    Eosinophil % 0.2 (L) 0.3 - 6.2 %    Basophil % 0.4 0.0 - 1.5 %    Immature Grans % 0.2 0.0 - 0.5 %    Neutrophils, Absolute 3.58 1.70 - 7.00 10*3/mm3    Lymphocytes, Absolute 1.54 0.70 - 3.10 10*3/mm3    Monocytes, Absolute 0.47 0.10 - 0.90 10*3/mm3    Eosinophils, Absolute  0.01 0.00 - 0.40 10*3/mm3    Basophils, Absolute 0.02 0.00 - 0.20 10*3/mm3    Immature Grans, Absolute 0.01 0.00 - 0.05 10*3/mm3    nRBC 0.0 0.0 - 0.2 /100 WBC   ECG 12 Lead Rhythm Change    Collection Time: 01/23/23 12:42 PM   Result Value Ref Range    QT Interval 392 ms   Comprehensive Metabolic Panel    Collection Time: 01/23/23  1:11 PM    Specimen: Blood   Result Value Ref Range    Glucose 93 65 - 99 mg/dL    BUN 15 8 - 23 mg/dL    Creatinine 1.00 0.57 - 1.00 mg/dL    Sodium 143 136 - 145 mmol/L    Potassium 3.5 3.5 - 5.2 mmol/L    Chloride 107 98 - 107 mmol/L    CO2 23.6 22.0 - 29.0 mmol/L    Calcium 9.3 8.6 - 10.5 mg/dL    Total Protein 6.5 6.0 - 8.5 g/dL    Albumin 4.0 3.5 - 5.2 g/dL    ALT (SGPT) 19 1 - 33 U/L    AST (SGOT) 24 1 - 32 U/L    Alkaline Phosphatase 88 39 - 117 U/L    Total Bilirubin 1.3 (H) 0.0 - 1.2 mg/dL    Globulin 2.5 gm/dL    A/G Ratio 1.6 g/dL    BUN/Creatinine Ratio 15.0 7.0 - 25.0    Anion Gap 12.4 5.0 - 15.0 mmol/L    eGFR 59.6 (L) >60.0 mL/min/1.73   Troponin    Collection Time: 01/23/23  1:11 PM    Specimen: Blood   Result Value Ref Range    Troponin T <0.010 0.000 - 0.030 ng/mL       Ordered the above labs and reviewed the results.        RADIOLOGY  XR Chest 1 View    Result Date: 1/23/2023  XR CHEST 1 VW-  HISTORY: Female who is 72 years-old,  palpitations  TECHNIQUE: Frontal views of the chest  COMPARISON: None available  FINDINGS: Heart, mediastinum and pulmonary vasculature are unremarkable. No focal pulmonary consolidation, pleural effusion, or pneumothorax. No acute osseous process.      No focal pulmonary consolidation. Follow-up as clinical indications persist.  This report was finalized on 1/23/2023 12:50 PM by Dr. Julian Briscoe M.D.        Ordered the above noted radiological studies. Reviewed by me in PACS.            PROCEDURES  Chemical Cardioversion    Date/Time: 1/23/2023 2:16 PM  Performed by: Alin Bundy MD  Authorized by: Alin Bundy MD      Consent:     Consent obtained:  Verbal    Consent given by:  Patient  Pre-procedure details:     Cardioversion basis:  Urgent    Rhythm:  Supraventricular tachycardia  Attempt one:    : diltiazem.      Medication outcome:  Conversion to normal sinus rhythm  Post-procedure details:     Patient status:  Awake    Patient tolerance of procedure:  Tolerated well, no immediate complications                  MEDICATIONS GIVEN IN ER  Medications   sodium chloride 0.9 % flush 10 mL (has no administration in time range)   dilTIAZem (CARDIZEM) injection 20 mg (20 mg Intravenous Given 1/23/23 1234)                   MEDICAL DECISION MAKING, PROGRESS, and CONSULTS    All labs have been independently reviewed by me.  All radiology studies have been reviewed by me and I have also reviewed the radiology report.   EKG's independently viewed and interpreted by me.  Discussion below represents my analysis of pertinent findings related to patient's condition, differential diagnosis, treatment plan and final disposition.      Additional sources:    - External (non-ED) record review: There is prior mention in patient's chart of AVNRT however I cannot see any cardiology notes to corroborate this.        Orders placed during this visit:  Orders Placed This Encounter   Procedures   • XR Chest 1 View   • Comprehensive Metabolic Panel   • BNP   • Troponin   • Magnesium   • TSH   • T4, Free   • CBC Auto Differential   • Ambulatory Referral to Cardiology   • Pulse Oximetry, Continuous   • Cardiac Monitoring   • Monitor Blood Pressure   • ECG 12 Lead Tachycardia   • ECG 12 Lead Rhythm Change   • Insert Peripheral IV   • CBC & Differential           Differential diagnosis:    AVNRT  SVT  Atrial flutter  Atrial fibrillation        Independent interpretation of labs, radiology studies, and discussions with consultants:  ED Course as of 01/23/23 1414   Mon Jan 23, 2023   1240 Patient converted to sinus rhythm after the diltiazem bolus.  Repeat  EKG has been ordered. [JR]   1304 Patient reassessed.  She reports complete symptomatic relief after the diltiazem.  I explained that I anticipate we can discharge her home if her labs are reassuring with cardiology referral and return precautions. [JR]   1409 Troponin T: <0.010 [JR]   1409 Magnesium: 2.1 [JR]   1409 Potassium: 3.5 [JR]   1413 Medical record review: I reviewed prior GI office visit from 8/12/2021.  She has a history of autoimmune hepatitis. [JR]   1413 EKG          EKG time: 1140  Rhythm/Rate: SVT, 165  P waves and ME: N/A  QRS, axis: Normal axis  ST and T waves: Inferior and lateral ST depression    Interpreted Contemporaneously by me, independently viewed         [JR]   1413 EKG          EKG time: 1242  Rhythm/Rate: Sinus rhythm, 69  P waves and ME: Normal  QRS, axis: Rightward axis  ST and T waves: Nonspecific T wave inversion lead III    Interpreted Contemporaneously by me, independently viewed  Compared to the prior tracing earlier today, patient has converted to sinus rhythm       [JR]      ED Course User Index  [JR] Alin Bundy MD             I wore an N95 mask, face shield, and gloves during this patient encounter.  Patient also wearing a surgical mask.  Hand hygeine performed before and after seeing the patient.    DIAGNOSIS  Final diagnoses:   SVT (supraventricular tachycardia) (HCC)         DISPOSITION  DISCHARGE    Patient discharged in stable condition.    Reviewed implications of results, diagnosis, meds, responsibility to follow up, warning signs and symptoms of possible worsening, potential complications and reasons to return to ER.    Patient/Family voiced understanding of above instructions.    Discussed plan for discharge, as there is no emergent indication for admission. Patient referred to primary care provider for BP management due to today's BP. Pt/family is agreeable and understands need for follow up and repeat testing.  Pt is aware that discharge does not mean  that nothing is wrong but it indicates no emergency is present that requires admission and they must continue care with follow-up as given below or physician of their choice.     FOLLOW-UP  Landon Moreno MD  [address] [place] [id number] [phone]    Schedule an appointment as soon as possible for a visit            Medication List      No changes were made to your prescriptions during this visit.                   Latest Documented Vital Signs:  As of 14:14 EST  BP- 115/62 HR- 61 Temp- 97.8 °F (36.6 °C) (Tympanic) O2 sat- 94%              --    Please note that portions of this were completed with a voice recognition program.       Note Disclaimer: At [facility], we believe that sharing information builds trust and better relationships. You are receiving this note because you are receiving care at [facility] or recently visited. It is possible you will see health information before a provider has talked with you about it. This kind of information can be easy to misunderstand. To help you fully understand what it means for your health, we urge you to discuss this note with your provider.           Alin Bundy MD  01/23/23 4512

## 2023-01-24 ENCOUNTER — OFFICE VISIT (OUTPATIENT)
Dept: GASTROENTEROLOGY | Facility: CLINIC | Age: 73
End: 2023-01-24
Payer: MEDICARE

## 2023-01-24 VITALS
TEMPERATURE: 97.7 F | HEART RATE: 71 BPM | SYSTOLIC BLOOD PRESSURE: 128 MMHG | OXYGEN SATURATION: 99 % | WEIGHT: 195.6 LBS | DIASTOLIC BLOOD PRESSURE: 78 MMHG | HEIGHT: 66 IN | BODY MASS INDEX: 31.43 KG/M2

## 2023-01-24 DIAGNOSIS — K74.69 OTHER CIRRHOSIS OF LIVER: ICD-10-CM

## 2023-01-24 DIAGNOSIS — K21.00 GASTROESOPHAGEAL REFLUX DISEASE WITH ESOPHAGITIS WITHOUT HEMORRHAGE: Primary | ICD-10-CM

## 2023-01-24 DIAGNOSIS — Z86.010 HISTORY OF COLON POLYPS: ICD-10-CM

## 2023-01-24 DIAGNOSIS — K75.4 AUTOIMMUNE HEPATITIS: ICD-10-CM

## 2023-01-24 PROCEDURE — 99214 OFFICE O/P EST MOD 30 MIN: CPT

## 2023-01-24 NOTE — PROGRESS NOTES
Chief Complaint   Patient presents with   • Follow-up     Post EGD and colonoscopy evaluation           History of Present Illness    Patient is a 72 y.o. who presents to the office for follow up evaluation.  Last in office visit was on 11/7/2022 with CHRISTIAN Young for rectal bleeding.  Patient has a significant past medical history of autoimmune hepatitis and cirrhosis.  For autoimmune hepatitis she continues on azathioprine 50 mg daily.  She also has a history of colon polyps, hemorrhoids, and diverticulosis.    On 12/29/2022 patient underwent EGD and colonoscopy evaluation with Dr. Toth.  EGD remarkable for LA grade B esophagitis-LES was lax with intermittent axial herniation with spontaneous reduction consistent with slipped wrap, multiple gastric fundic polyps, unremarkable duodenum.    Colonoscopy was with good prep and remarkable for 3 and 2 mm polyps in the ascending colon,  5 mm tubular adenomatous polyp in the ascending colon, 3 and 5 mm tubular adenomatous polyps in the transverse colon.    Recommend next colonoscopy for colon cancer screening in 3 years or December 2025     She presents to the office today for follow-up discussion after undergoing EGD and colonoscopy evaluation with Dr. Toth.    States that yesterday on 1/23/2023 she sought ER evaluation for SVT event and was discharged with close follow-up with primary care and cardiology.  Denies chest pain or shortness of breath at time of today's visit.    For GERD, she continues on pantoprazole 40 mg once daily approximately 30 to 60 minutes prior to breakfast and describes symptoms as overall well controlled without recurrence of heartburn, nausea, vomiting, or dysphagia.    Describes bowel habit is occurring regularly without evidence of melena hematochezia.  Denies lower GI complaints.  She did however experience a single episode of diarrhea after consuming ice cream and a salad that resolved after taking Imodium.  Denies fever or  "chills.    Result Review :       Office Visit with Ilan Oly GCHRISTIAN (11/07/2022)  UPPER GI ENDOSCOPY (12/29/2022 12:58)  COLONOSCOPY (12/29/2022 12:58)  Tissue Pathology Exam (12/29/2022 13:04)      Vital Signs:   /78   Pulse 71   Temp 97.7 °F (36.5 °C)   Ht 167.6 cm (66\")   Wt 88.7 kg (195 lb 9.6 oz)   SpO2 99%   BMI 31.57 kg/m²     Body mass index is 31.57 kg/m².     Physical Exam  Vitals reviewed.   Constitutional:       General: She is not in acute distress.     Appearance: She is well-developed.   HENT:      Head: Normocephalic and atraumatic.   Pulmonary:      Effort: Pulmonary effort is normal. No respiratory distress.   Abdominal:      General: Abdomen is flat. Bowel sounds are normal. There is no distension.      Palpations: Abdomen is soft. There is no mass.      Tenderness: There is no abdominal tenderness. There is no guarding or rebound.      Hernia: No hernia is present.   Skin:     General: Skin is dry.      Coloration: Skin is not pale.   Neurological:      Mental Status: She is alert and oriented to person, place, and time.   Psychiatric:         Thought Content: Thought content normal.           Assessment and Plan    Diagnoses and all orders for this visit:    1. Gastroesophageal reflux disease with esophagitis without hemorrhage (Primary)    2. Autoimmune hepatitis (HCC)  -     CBC & Differential  -     Comprehensive Metabolic Panel  -     Protime-INR  -     AFP Tumor Marker    3. Other cirrhosis of liver (HCC)  -     CBC & Differential  -     Comprehensive Metabolic Panel  -     Protime-INR  -     AFP Tumor Marker    4. History of colon polyps           Discussion:    Patient is a pleasant 72 y.o.  who presents to the office for follow up evaluation.  Reviewed results of EGD and colonoscopy at length with patient.  Recommend continuing pantoprazole 40 mg daily as prescribed.    For monitoring of autoimmune hepatitis we will proceed with planned lab work for meld score " calculation and late March to first week of April-lab requisition sheet provided to patient.  We will also proceed with liver ultrasound as ordered at this time.    For change in bowel habits recommend patient start fiber supplementation.    Reiterated the importance of following up with primary care and cardiology as ordered per ER discharge instructions.    Patient is agreeable to the outlined above treatment plan.  Verbalizes understanding and will contact office for any new or worsening concerns.  All questions answered and support provided.        Patient Instructions   We recommend starting a daily fiber supplement such as FiberCon or psyllium capsules      These can be purchased over-the-counter, generic brand is fine.  Fiber supplements can take 12 to 72 hours to start working. Make sure to drink 6 to 12 ounces of water or noncarbonated beverage with fiber supplement.     Recommended starting with half of product listed daily dose for 7 days to help reduce risk of abdominal bloating/gas and allow your body to acclimate to fiber diet intake.  If you do not experience any of these adverse effects may increase to daily dose listed on product.    Next liver bloodwork and ultrasound due around 4/1/2023              EMR Dragon/Transcription Disclaimer:  This document has been Dictated utilizing Dragon dictation.

## 2023-01-24 NOTE — PATIENT INSTRUCTIONS
We recommend starting a daily fiber supplement such as FiberCon or psyllium capsules      These can be purchased over-the-counter, generic brand is fine.  Fiber supplements can take 12 to 72 hours to start working. Make sure to drink 6 to 12 ounces of water or noncarbonated beverage with fiber supplement.     Recommended starting with half of product listed daily dose for 7 days to help reduce risk of abdominal bloating/gas and allow your body to acclimate to fiber diet intake.  If you do not experience any of these adverse effects may increase to daily dose listed on product.    Next liver bloodwork and ultrasound due around 4/1/2023

## 2023-02-08 RX ORDER — METOPROLOL SUCCINATE 50 MG/1
TABLET, EXTENDED RELEASE ORAL
Qty: 90 TABLET | Refills: 3 | Status: SHIPPED | OUTPATIENT
Start: 2023-02-08

## 2023-02-14 ENCOUNTER — CLINICAL SUPPORT (OUTPATIENT)
Dept: INTERNAL MEDICINE | Facility: CLINIC | Age: 73
End: 2023-02-14
Payer: MEDICARE

## 2023-02-14 ENCOUNTER — OFFICE VISIT (OUTPATIENT)
Dept: CARDIOLOGY | Facility: CLINIC | Age: 73
End: 2023-02-14
Payer: MEDICARE

## 2023-02-14 VITALS
WEIGHT: 198.2 LBS | HEIGHT: 66 IN | BODY MASS INDEX: 31.85 KG/M2 | HEART RATE: 60 BPM | DIASTOLIC BLOOD PRESSURE: 73 MMHG | SYSTOLIC BLOOD PRESSURE: 138 MMHG

## 2023-02-14 DIAGNOSIS — R07.89 CHEST PAIN, ATYPICAL: Primary | ICD-10-CM

## 2023-02-14 PROCEDURE — 93000 ELECTROCARDIOGRAM COMPLETE: CPT | Performed by: INTERNAL MEDICINE

## 2023-02-14 PROCEDURE — 96372 THER/PROPH/DIAG INJ SC/IM: CPT | Performed by: PHYSICIAN ASSISTANT

## 2023-02-14 PROCEDURE — 99204 OFFICE O/P NEW MOD 45 MIN: CPT | Performed by: INTERNAL MEDICINE

## 2023-02-14 RX ADMIN — CYANOCOBALAMIN 1000 MCG: 1000 INJECTION, SOLUTION INTRAMUSCULAR; SUBCUTANEOUS at 13:06

## 2023-02-14 NOTE — PROGRESS NOTES
PATIENTINFORMATION    Date of Office Visit: 2023  Encounter Provider: Landon Moreno MD  Place of Service: [facility] CARDIOLOGY  Patient Name: Esme Lemus  : 1950    Subjective:     Encounter Date:2023      Patient ID: Esme Lemus is a 72 y.o. female.    No chief complaint on file.    HPI  Ms. Lemus is a pleasant 72 years old lady who came to cardiac clinic for further evaluation and treatment of paroxysmal SVT.  She had about 3 episodes that required ER visit in the hospital in the past with first 1 about 7 years ago, second episode a year ago (admitted to [facility] for 24 hours, she was offered a procedure that she declined).  Last episode was more on 2023 when she started having palpitations at  and prompted her to go to the ER.  She was noted to be in SVT and she was given diltiazem IV that terminated arrhythmia.  She denies any presyncope or syncope.  No known exacerbating or relieving factor.  She reports isolated episodes of retrosternal chest discomfort that she describes as hurting and comes both during activities and rest.  She has longstanding history of hypertension controlled with current medications that includes beta-blocker.  She takes Lasix daily for leg swelling.  She does house chores but does not exercise regularly  She denies any tobacco use, recreational drug use or alcohol abuse.      ROS  All systems reviewed and negative except as noted in HPI.    Past Medical History:   Diagnosis Date   • Abnormal blood chemistry    • Acid reflux disease    • Acute sinusitis    • Allergic rhinitis    • Anxiety disorder    • Arthritis    • AV david re-entry tachycardia (HCC)    • BMI 34.0-34.9,adult    • Cancer (HCC)     hysterectomy partial    • Cataract    • Deficiency of vitamin B12    • Elevated liver enzymes    • Elevated liver function tests    • Encounter for screening for osteoporosis    • Fatigue    • GERD (gastroesophageal  reflux disease)    • Hiatal hernia    • High blood pressure    • High cholesterol    • History of sinusitis    • Hyperlipidemia    • Hypertension    • Knee pain    • Liver disease    • Lower back pain    • Lumbosacral spinal stenosis    • Menopause    • Metabolic syndrome    • Musculoskeletal back pain    • Osteoarthritis of both knees    • Osteopenia of multiple sites    • Paroxysmal supraventricular tachycardia (HCC)    • Right flank discomfort    • Sciatica of right side    • Sleep apnea     doesn't wear- bothers her with reflux   • Sleep disturbance    • Stomach ulcer    • Type 2 diabetes mellitus (HCC)    • Visit for screening mammogram    • Weight loss counseling, encounter for        Past Surgical History:   Procedure Laterality Date   • COLONOSCOPY     • COLONOSCOPY Left 03/08/2021    Procedure: COLONOSCOPY;  Surgeon: Javier Toth MD;  Location: [facility];  Service: Gastroenterology;  Laterality: Left;   • COLONOSCOPY W/ POLYPECTOMY N/A 12/29/2022    Procedure: COLONOSCOPY WITH POLYPECTOMY;  Surgeon: Javier Toth MD;  Location: [facility];  Service: Gastroenterology;  Laterality: N/A;  HEMORRHOIDS, POLYP   • ENDOSCOPY Left 03/08/2021    Procedure: ESOPHAGOGASTRODUODENOSCOPY;  Surgeon: Javier Toth MD;  Location: [facility];  Service: Gastroenterology;  Laterality: Left;   • ENDOSCOPY N/A 12/29/2022    Procedure: ESOPHAGOGASTRODUODENOSCOPY WITH BIOPSY;  Surgeon: Javier Toth MD;  Location: [facility];  Service: Gastroenterology;  Laterality: N/A;  ESOPHAGITIS, GASTRIC POLYPS   • HEMORROIDECTOMY     • HERNIA REPAIR     • HYSTERECTOMY      due to cancer   • INGUINAL HERNIA REPAIR     • KNEE SURGERY     • SINUS SURGERY     • TUBAL ABDOMINAL LIGATION     • UPPER GASTROINTESTINAL ENDOSCOPY         Social History     Socioeconomic History   • Marital status:    Tobacco Use   • Smoking status: Former   • Smokeless tobacco: Never   Vaping Use   • Vaping Use: Never  "used   Substance and Sexual Activity   • Alcohol use: Yes     Comment: social   • Drug use: Never   • Sexual activity: Defer       Family History   Problem Relation Age of Onset   • Alzheimer's disease Mother    • Kidney failure Father    • Alzheimer's disease Brother    • Lung cancer Brother    • Stomach cancer Brother    • Alcohol abuse Other    • Hypertension Other    • Arthritis Other    • Colon cancer Neg Hx    • Colon polyps Neg Hx    • Crohn's disease Neg Hx    • Irritable bowel syndrome Neg Hx    • Ulcerative colitis Neg Hx            ECG 12 Lead    Date/Time: 2/14/2023 1:53 PM  Performed by: Landon Moreno MD  Authorized by: Landon Moreno MD   Comparison: compared with previous ECG from 1/23/2023  Similar to previous ECG  Rhythm: sinus rhythm  Rate: normal  Conduction: conduction normal  ST Segments: ST segments normal  T Waves: T waves normal  QRS axis: normal  Other: no other findings    Clinical impression: normal ECG               Objective:     /73   Pulse 60   Ht 167.6 cm (66\")   Wt 89.9 kg (198 lb 3.2 oz)   BMI 31.99 kg/m²  Body mass index is 31.99 kg/m².     Constitutional:       General: Not in acute distress.     Appearance: Well-developed. Not diaphoretic.   Eyes:      Pupils: Pupils are equal, round, and reactive to light.   HENT:      Head: Normocephalic and atraumatic.   Neck:      Thyroid: No thyromegaly.   Pulmonary:      Effort: Pulmonary effort is normal. No respiratory distress.      Breath sounds: Normal breath sounds. No wheezing. No rales.   Chest:      Chest wall: Not tender to palpatation.   Cardiovascular:      Normal rate. Regular rhythm.      No gallop.   Pulses:     Intact distal pulses.   Edema:     Peripheral edema absent.   Abdominal:      General: Bowel sounds are normal. There is no distension.      Palpations: Abdomen is soft.      Tenderness: There is no guarding.   Musculoskeletal: Normal range of motion.         General: No deformity.      " Cervical back: Normal range of motion and neck supple. Skin:     General: Skin is warm and dry.      Findings: No rash.   Neurological:      Mental Status: Alert and oriented to person, place, and time.      Cranial Nerves: No cranial nerve deficit.      Deep Tendon Reflexes: Reflexes are normal and symmetric.   Psychiatric:         Judgment: Judgment normal.         Review Of Data: I have reviewed pertinent recent labs, images and documents and pertinent findings included in HPI or assessment below.    Lipid Panel    Lipid Panel 8/24/22 11/22/22   Total Cholesterol 260 (A)    Total Cholesterol  177   Triglycerides 69 54   HDL Cholesterol 103 (A) 96 (A)   VLDL Cholesterol 11 11   LDL Cholesterol  146 (A) 70   (A) Abnormal value       Comments are available for some flowsheets but are not being displayed.               Assessment/Plan:         1.  Paroxysmal SVT   Today she is in sinus rhythm  Consider EP referral after stress testing and echo   2.  Atypical chest pain with risk factors for coronary artery disease  3.  Autoimmune hepatitis on azathioprine and follows up with gastroenterologist.  4.  Hypercholesterolemia-on atorvastatin: Most recent lipid panel at goal.  5.  Essential hypertension-fairly controlled with current regimen that includes amlodipine, losartan, metoprolol, and Lasix  6.  Chronic bilateral ankle swelling-on Lasix    Get myocardial perfusion study  Echocardiogram    Diagnosis and plan of care discussed with patient and verbalized understanding.            Your medication list          Accurate as of February 14, 2023  2:15 PM. If you have any questions, ask your nurse or doctor.            CONTINUE taking these medications      Instructions Last Dose Given Next Dose Due   amLODIPine 10 MG tablet  Commonly known as: NORVASC      Take 10 mg by mouth Daily.       Aspirin Buf(CaCarb-MgCarb-MgO) 81 MG tablet      Take 81 mg by mouth Daily.       atorvastatin 20 MG tablet  Commonly known as:  LIPITOR      TAKE 1 TABLET DAILY       azaTHIOprine 50 MG tablet  Commonly known as: IMURAN      TAKE 1 TABLET DAILY       benzonatate 100 MG capsule  Commonly known as: Tessalon Perles      Take 1 capsule by mouth 3 (Three) Times a Day As Needed for Cough.       Diclofenac Sodium 1 % gel gel  Commonly known as: VOLTAREN      APPLY 4 G TOPICALLY TO THE APPROPRIATE AREA AS DIRECTED 4 TIMES A DAY AS NEEDED (MUSCLE PAIN).       fexofenadine 180 MG tablet  Commonly known as: ALLEGRA      Take 180 mg by mouth Daily.       fluticasone 50 MCG/ACT nasal spray  Commonly known as: FLONASE      2 sprays into the nostril(s) as directed by provider Daily.       furosemide 20 MG tablet  Commonly known as: LASIX      Take 20 mg by mouth Daily.       losartan 50 MG tablet  Commonly known as: COZAAR      Take 50 mg by mouth Daily.       Lumigan 0.01 % ophthalmic drops  Generic drug: bimatoprost      Administer 1 drop to both eyes Every Night.       metoprolol succinate XL 50 MG 24 hr tablet  Commonly known as: TOPROL-XL      TAKE 1 TABLET DAILY       multivitamin tablet tablet  Generic drug: multivitamin      Take 1 tablet by mouth Daily.       pantoprazole 40 MG EC tablet  Commonly known as: PROTONIX      TAKE 1 TABLET DAILY ONE HOUR BEFORE FIRST MEAL OF THE DAY       VITAMIN B COMPLEX-C PO      Take  by mouth.       vitamin D3 125 MCG (5000 UT) capsule capsule      Take 5,000 Units by mouth Daily.                  Landon Moreno MD  02/14/23  14:15 EST

## 2023-03-02 ENCOUNTER — HOSPITAL ENCOUNTER (OUTPATIENT)
Dept: CARDIOLOGY | Facility: HOSPITAL | Age: 73
Discharge: HOME | End: 2023-03-02
Admitting: INTERNAL MEDICINE
Payer: MEDICARE

## 2023-03-02 VITALS
SYSTOLIC BLOOD PRESSURE: 124 MMHG | HEART RATE: 64 BPM | WEIGHT: 198 LBS | BODY MASS INDEX: 31.82 KG/M2 | DIASTOLIC BLOOD PRESSURE: 70 MMHG | HEIGHT: 66 IN

## 2023-03-02 DIAGNOSIS — R07.89 CHEST PAIN, ATYPICAL: ICD-10-CM

## 2023-03-02 LAB
AORTIC ARCH: 2.3 CM
AORTIC DIMENSIONLESS INDEX: 0.8 (DI)
ASCENDING AORTA: 2.8 CM
BH CV ECHO LEFT VENTRICLE GLOBAL LONGITUDINAL STRAIN: -24.3 %
BH CV ECHO MEAS - ACS: 1.99 CM
BH CV ECHO MEAS - AI P1/2T: 601.8 MSEC
BH CV ECHO MEAS - AO MAX PG: 7.4 MMHG
BH CV ECHO MEAS - AO MEAN PG: 3.9 MMHG
BH CV ECHO MEAS - AO ROOT DIAM: 3.3 CM
BH CV ECHO MEAS - AO V2 MAX: 135.7 CM/SEC
BH CV ECHO MEAS - AO V2 VTI: 29.3 CM
BH CV ECHO MEAS - AVA(I,D): 2.39 CM2
BH CV ECHO MEAS - EDV(CUBED): 87.7 ML
BH CV ECHO MEAS - EDV(MOD-SP2): 71 ML
BH CV ECHO MEAS - EDV(MOD-SP4): 94 ML
BH CV ECHO MEAS - EF(MOD-BP): 65.4 %
BH CV ECHO MEAS - EF(MOD-SP2): 64.8 %
BH CV ECHO MEAS - EF(MOD-SP4): 67 %
BH CV ECHO MEAS - EF_3D-VOL: 68 %
BH CV ECHO MEAS - ESV(CUBED): 20.2 ML
BH CV ECHO MEAS - ESV(MOD-SP2): 25 ML
BH CV ECHO MEAS - ESV(MOD-SP4): 31 ML
BH CV ECHO MEAS - FS: 38.7 %
BH CV ECHO MEAS - IVS/LVPW: 1.07 CM
BH CV ECHO MEAS - IVSD: 1.1 CM
BH CV ECHO MEAS - LA 3D VOL INDEX: 50
BH CV ECHO MEAS - LAT PEAK E' VEL: 9 CM/SEC
BH CV ECHO MEAS - LV DIASTOLIC VOL/BSA (35-75): 47.2 CM2
BH CV ECHO MEAS - LV MASS(C)D: 164.1 GRAMS
BH CV ECHO MEAS - LV MAX PG: 3.3 MMHG
BH CV ECHO MEAS - LV MEAN PG: 1.84 MMHG
BH CV ECHO MEAS - LV SYSTOLIC VOL/BSA (12-30): 15.6 CM2
BH CV ECHO MEAS - LV V1 MAX: 91.3 CM/SEC
BH CV ECHO MEAS - LV V1 VTI: 22.2 CM
BH CV ECHO MEAS - LVIDD: 4.4 CM
BH CV ECHO MEAS - LVIDS: 2.7 CM
BH CV ECHO MEAS - LVOT AREA: 3.2 CM2
BH CV ECHO MEAS - LVOT DIAM: 2 CM
BH CV ECHO MEAS - LVPWD: 1.03 CM
BH CV ECHO MEAS - MED PEAK E' VEL: 6.6 CM/SEC
BH CV ECHO MEAS - MV A DUR: 0.19 SEC
BH CV ECHO MEAS - MV A MAX VEL: 100.7 CM/SEC
BH CV ECHO MEAS - MV DEC SLOPE: 393.4 CM/SEC2
BH CV ECHO MEAS - MV DEC TIME: 0.19 MSEC
BH CV ECHO MEAS - MV E MAX VEL: 88.3 CM/SEC
BH CV ECHO MEAS - MV E/A: 0.88
BH CV ECHO MEAS - MV MAX PG: 6.2 MMHG
BH CV ECHO MEAS - MV MEAN PG: 1.65 MMHG
BH CV ECHO MEAS - MV P1/2T: 68.7 MSEC
BH CV ECHO MEAS - MV V2 VTI: 28.5 CM
BH CV ECHO MEAS - MVA(P1/2T): 3.2 CM2
BH CV ECHO MEAS - MVA(VTI): 2.46 CM2
BH CV ECHO MEAS - PA ACC TIME: 0.06 SEC
BH CV ECHO MEAS - PA PR(ACCEL): 52.9 MMHG
BH CV ECHO MEAS - PA V2 MAX: 84.2 CM/SEC
BH CV ECHO MEAS - PI END-D VEL: 145.2 CM/SEC
BH CV ECHO MEAS - PULM A REVS DUR: 0.21 SEC
BH CV ECHO MEAS - PULM A REVS VEL: 41.6 CM/SEC
BH CV ECHO MEAS - PULM DIAS VEL: 41.6 CM/SEC
BH CV ECHO MEAS - PULM S/D: 1.18
BH CV ECHO MEAS - PULM SYS VEL: 48.8 CM/SEC
BH CV ECHO MEAS - QP/QS: 0.56
BH CV ECHO MEAS - RAP SYSTOLE: 3 MMHG
BH CV ECHO MEAS - RV MAX PG: 1.34 MMHG
BH CV ECHO MEAS - RV V1 MAX: 57.8 CM/SEC
BH CV ECHO MEAS - RV V1 VTI: 14 CM
BH CV ECHO MEAS - RVOT DIAM: 1.88 CM
BH CV ECHO MEAS - RVSP: 15.5 MMHG
BH CV ECHO MEAS - SI(MOD-SP2): 23.1 ML/M2
BH CV ECHO MEAS - SI(MOD-SP4): 31.6 ML/M2
BH CV ECHO MEAS - SV(LVOT): 70 ML
BH CV ECHO MEAS - SV(MOD-SP2): 46 ML
BH CV ECHO MEAS - SV(MOD-SP4): 63 ML
BH CV ECHO MEAS - SV(RVOT): 38.9 ML
BH CV ECHO MEAS - TAPSE (>1.6): 2.17 CM
BH CV ECHO MEAS - TR MAX PG: 12.5 MMHG
BH CV ECHO MEAS - TR MAX VEL: 176.5 CM/SEC
BH CV ECHO MEASUREMENTS AVERAGE E/E' RATIO: 11.32
BH CV NUCLEAR PRIOR STUDY: 2
BH CV REST NUCLEAR ISOTOPE DOSE: 24.5 MCI
BH CV STRESS BP STAGE 1: NORMAL
BH CV STRESS COMMENTS STAGE 1: NORMAL
BH CV STRESS DOSE REGADENOSON STAGE 1: 0.4
BH CV STRESS DURATION MIN STAGE 1: 0
BH CV STRESS DURATION SEC STAGE 1: 10
BH CV STRESS HR STAGE 1: 86
BH CV STRESS NUCLEAR ISOTOPE DOSE: 24.5 MCI
BH CV STRESS O2 STAGE 1: 96
BH CV STRESS PROTOCOL 1: NORMAL
BH CV STRESS RECOVERY BP: NORMAL MMHG
BH CV STRESS RECOVERY HR: 73 BPM
BH CV STRESS STAGE 1: 1
BH CV XLRA - RV BASE: 2.8 CM
BH CV XLRA - RV LENGTH: 6.3 CM
BH CV XLRA - RV MID: 2.6 CM
BH CV XLRA - TDI S': 14.8 CM/SEC
LEFT ATRIUM VOLUME INDEX: 25.4 ML/M2
LV EF NUC BP: 82 %
MAXIMAL PREDICTED HEART RATE: 147 BPM
MAXIMAL PREDICTED HEART RATE: 147 BPM
PERCENT MAX PREDICTED HR: 58.5 %
SINUS: 2.9 CM
STJ: 2.6 CM
STRESS BASELINE BP: NORMAL MMHG
STRESS BASELINE HR: 65 BPM
STRESS O2 SAT REST: 95 %
STRESS PERCENT HR: 69 %
STRESS POST EXERCISE DUR SEC: 10 SEC
STRESS POST O2 SAT PEAK: 96 %
STRESS POST PEAK BP: NORMAL MMHG
STRESS POST PEAK HR: 86 BPM
STRESS TARGET HR: 125 BPM
STRESS TARGET HR: 125 BPM

## 2023-03-02 PROCEDURE — 78492 MYOCRD IMG PET MLT RST&STRS: CPT | Performed by: INTERNAL MEDICINE

## 2023-03-02 PROCEDURE — 93306 TTE W/DOPPLER COMPLETE: CPT

## 2023-03-02 PROCEDURE — 93356 MYOCRD STRAIN IMG SPCKL TRCK: CPT

## 2023-03-02 PROCEDURE — 25010000002 REGADENOSON 0.4 MG/5ML SOLUTION: Performed by: INTERNAL MEDICINE

## 2023-03-02 PROCEDURE — 0 RUBIDIUM CHLORIDE: Performed by: INTERNAL MEDICINE

## 2023-03-02 PROCEDURE — 93018 CV STRESS TEST I&R ONLY: CPT | Performed by: INTERNAL MEDICINE

## 2023-03-02 PROCEDURE — 93017 CV STRESS TEST TRACING ONLY: CPT

## 2023-03-02 PROCEDURE — 93356 MYOCRD STRAIN IMG SPCKL TRCK: CPT | Performed by: INTERNAL MEDICINE

## 2023-03-02 PROCEDURE — 93306 TTE W/DOPPLER COMPLETE: CPT | Performed by: INTERNAL MEDICINE

## 2023-03-02 PROCEDURE — 93016 CV STRESS TEST SUPVJ ONLY: CPT | Performed by: INTERNAL MEDICINE

## 2023-03-02 PROCEDURE — A9555 RB82 RUBIDIUM: HCPCS | Performed by: INTERNAL MEDICINE

## 2023-03-02 PROCEDURE — 78492 MYOCRD IMG PET MLT RST&STRS: CPT

## 2023-03-02 RX ADMIN — REGADENOSON 0.4 MG: 0.08 INJECTION, SOLUTION INTRAVENOUS at 07:51

## 2023-03-09 ENCOUNTER — TELEPHONE (OUTPATIENT)
Dept: CARDIOLOGY | Facility: CLINIC | Age: 73
End: 2023-03-09
Payer: MEDICARE

## 2023-03-09 DIAGNOSIS — I47.1 PAROXYSMAL SVT (SUPRAVENTRICULAR TACHYCARDIA): Primary | ICD-10-CM

## 2023-03-09 NOTE — TELEPHONE ENCOUNTER
Dr. Moreno,    Pt called today asking for her test results for her echo and stress test. Is there anything I can go over with her?    Thank you,    Katie Rasmussen RN  Triage [facility]  03/09/23 08:15 EST

## 2023-03-10 NOTE — TELEPHONE ENCOUNTER
Reviewed results and recommendations with Esme Lemus.  Patient verbalized understanding of results and recommendations.  Patient is agreeable with this plan.    Scheduling,  Please call patient to schedule new patient appointment with EP.    Thank you,  Lili GUTIERREZ RN  Triage Nurse [facility]

## 2023-03-17 ENCOUNTER — CLINICAL SUPPORT (OUTPATIENT)
Dept: INTERNAL MEDICINE | Facility: CLINIC | Age: 73
End: 2023-03-17
Payer: MEDICARE

## 2023-03-17 DIAGNOSIS — E53.8 B12 DEFICIENCY: ICD-10-CM

## 2023-03-17 PROCEDURE — 96372 THER/PROPH/DIAG INJ SC/IM: CPT | Performed by: PHYSICIAN ASSISTANT

## 2023-03-17 RX ADMIN — CYANOCOBALAMIN 1000 MCG: 1000 INJECTION, SOLUTION INTRAMUSCULAR; SUBCUTANEOUS at 08:58

## 2023-03-28 LAB
AFP-TM SERPL-MCNC: <1.8 NG/ML (ref 0–9.2)
ALBUMIN SERPL-MCNC: 4.3 G/DL (ref 3.7–4.7)
ALBUMIN/GLOB SERPL: 1.7 {RATIO} (ref 1.2–2.2)
ALP SERPL-CCNC: 112 IU/L (ref 44–121)
ALT SERPL-CCNC: 25 IU/L (ref 0–32)
AST SERPL-CCNC: 33 IU/L (ref 0–40)
BASOPHILS # BLD AUTO: 0 X10E3/UL (ref 0–0.2)
BASOPHILS NFR BLD AUTO: 0 %
BILIRUB SERPL-MCNC: 1.6 MG/DL (ref 0–1.2)
BUN SERPL-MCNC: 15 MG/DL (ref 8–27)
BUN/CREAT SERPL: 16 (ref 12–28)
CALCIUM SERPL-MCNC: 9.7 MG/DL (ref 8.7–10.3)
CHLORIDE SERPL-SCNC: 105 MMOL/L (ref 96–106)
CO2 SERPL-SCNC: 23 MMOL/L (ref 20–29)
CREAT SERPL-MCNC: 0.94 MG/DL (ref 0.57–1)
EGFRCR SERPLBLD CKD-EPI 2021: 64 ML/MIN/1.73
EOSINOPHIL # BLD AUTO: 0 X10E3/UL (ref 0–0.4)
EOSINOPHIL NFR BLD AUTO: 0 %
ERYTHROCYTE [DISTWIDTH] IN BLOOD BY AUTOMATED COUNT: 12.7 % (ref 11.7–15.4)
GLOBULIN SER CALC-MCNC: 2.6 G/DL (ref 1.5–4.5)
GLUCOSE SERPL-MCNC: 113 MG/DL (ref 70–99)
HCT VFR BLD AUTO: 44.3 % (ref 34–46.6)
HGB BLD-MCNC: 14.5 G/DL (ref 11.1–15.9)
IMM GRANULOCYTES # BLD AUTO: 0 X10E3/UL (ref 0–0.1)
IMM GRANULOCYTES NFR BLD AUTO: 0 %
INR PPP: 1 (ref 0.9–1.2)
LYMPHOCYTES # BLD AUTO: 1.3 X10E3/UL (ref 0.7–3.1)
LYMPHOCYTES NFR BLD AUTO: 24 %
MCH RBC QN AUTO: 30.5 PG (ref 26.6–33)
MCHC RBC AUTO-ENTMCNC: 32.7 G/DL (ref 31.5–35.7)
MCV RBC AUTO: 93 FL (ref 79–97)
MONOCYTES # BLD AUTO: 0.5 X10E3/UL (ref 0.1–0.9)
MONOCYTES NFR BLD AUTO: 8 %
NEUTROPHILS # BLD AUTO: 3.7 X10E3/UL (ref 1.4–7)
NEUTROPHILS NFR BLD AUTO: 68 %
PLATELET # BLD AUTO: 155 X10E3/UL (ref 150–450)
POTASSIUM SERPL-SCNC: 4.2 MMOL/L (ref 3.5–5.2)
PROT SERPL-MCNC: 6.9 G/DL (ref 6–8.5)
PROTHROMBIN TIME: 10.9 SEC (ref 9.1–12)
RBC # BLD AUTO: 4.76 X10E6/UL (ref 3.77–5.28)
SODIUM SERPL-SCNC: 143 MMOL/L (ref 134–144)
WBC # BLD AUTO: 5.5 X10E3/UL (ref 3.4–10.8)

## 2023-04-21 ENCOUNTER — HOSPITAL ENCOUNTER (OUTPATIENT)
Dept: ULTRASOUND IMAGING | Facility: HOSPITAL | Age: 73
Discharge: HOME | End: 2023-04-21
Payer: MEDICARE

## 2023-04-21 ENCOUNTER — CLINICAL SUPPORT (OUTPATIENT)
Dept: INTERNAL MEDICINE | Facility: CLINIC | Age: 73
End: 2023-04-21
Payer: MEDICARE

## 2023-04-21 DIAGNOSIS — K74.69 OTHER CIRRHOSIS OF LIVER: ICD-10-CM

## 2023-04-21 DIAGNOSIS — K74.69 OTHER CIRRHOSIS OF LIVER: Primary | ICD-10-CM

## 2023-04-21 DIAGNOSIS — E53.8 B12 DEFICIENCY: ICD-10-CM

## 2023-04-21 DIAGNOSIS — K75.4 AUTOIMMUNE HEPATITIS: ICD-10-CM

## 2023-04-21 PROCEDURE — 76705 ECHO EXAM OF ABDOMEN: CPT

## 2023-04-21 RX ADMIN — CYANOCOBALAMIN 1000 MCG: 1000 INJECTION, SOLUTION INTRAMUSCULAR; SUBCUTANEOUS at 08:35

## 2023-04-24 ENCOUNTER — OFFICE VISIT (OUTPATIENT)
Dept: GASTROENTEROLOGY | Facility: CLINIC | Age: 73
End: 2023-04-24
Payer: MEDICARE

## 2023-04-24 VITALS
TEMPERATURE: 97.5 F | HEART RATE: 75 BPM | SYSTOLIC BLOOD PRESSURE: 114 MMHG | HEIGHT: 66 IN | DIASTOLIC BLOOD PRESSURE: 68 MMHG | BODY MASS INDEX: 31.92 KG/M2 | WEIGHT: 198.6 LBS | OXYGEN SATURATION: 100 %

## 2023-04-24 DIAGNOSIS — K75.4 AUTOIMMUNE HEPATITIS: Primary | ICD-10-CM

## 2023-04-24 DIAGNOSIS — K21.00 GASTROESOPHAGEAL REFLUX DISEASE WITH ESOPHAGITIS WITHOUT HEMORRHAGE: ICD-10-CM

## 2023-04-24 DIAGNOSIS — K74.69 OTHER CIRRHOSIS OF LIVER: ICD-10-CM

## 2023-04-24 DIAGNOSIS — Z86.010 HISTORY OF COLON POLYPS: ICD-10-CM

## 2023-04-24 PROCEDURE — 3078F DIAST BP <80 MM HG: CPT | Performed by: NURSE PRACTITIONER

## 2023-04-24 PROCEDURE — 1160F RVW MEDS BY RX/DR IN RCRD: CPT | Performed by: NURSE PRACTITIONER

## 2023-04-24 PROCEDURE — 1159F MED LIST DOCD IN RCRD: CPT | Performed by: NURSE PRACTITIONER

## 2023-04-24 PROCEDURE — 99214 OFFICE O/P EST MOD 30 MIN: CPT | Performed by: NURSE PRACTITIONER

## 2023-04-24 PROCEDURE — 3074F SYST BP LT 130 MM HG: CPT | Performed by: NURSE PRACTITIONER

## 2023-04-24 RX ORDER — AMLODIPINE BESYLATE 10 MG/1
1 TABLET ORAL DAILY
COMMUNITY
Start: 2023-02-27

## 2023-04-24 NOTE — PATIENT INSTRUCTIONS
For autoimmune hepatitis, continue azathioprine 50 mg once daily as prescribed.  Lab work will be due end of June 2023 for monitoring.    For monitoring of cirrhosis, ultrasound will be due October 2023.    For GERD with esophagitis, continue pantoprazole daily as prescribed.    For surveillance of colon polyps, colonoscopy recommended at 3-year interval, due December 2025.

## 2023-04-24 NOTE — PROGRESS NOTES
"Chief Complaint   Patient presents with   • GI Problem         History of Present Illness  Patient is a 72-year-old female who presents today for Follow-up. She has a history of autoimmune hepatitis, cirrhosis, colon polyps, diverticulosis, esophagitis.    Patient presents today for follow-up.  She reports overall she has been feeling well since last office visit.  She continues on pantoprazole for GERD.  Reports she very rarely has any breakthrough heartburn or reflux.  Denies any nausea or vomiting.    Denies any abdominal pain.  Denies any bowel complaints or further blood in the stool.    Recent ultrasound for monitoring of cirrhosis was stable, recent labs also stable.  Continues on azathioprine 50 mg daily for autoimmune hepatitis.     Result Review :       Tissue Pathology Exam (12/29/2022 13:04)   COLONOSCOPY (12/29/2022 12:58)   UPPER GI ENDOSCOPY (12/29/2022 12:58)   Office Visit with Keila Bragg APRN (01/24/2023)   US Liver (04/21/2023 07:33)  AFP Tumor Marker (03/27/2023 09:36)  Protime-INR (03/27/2023 09:36)  Comprehensive Metabolic Panel (03/27/2023 09:36)  CBC & Differential (03/27/2023 09:36)        Vital Signs:   /68   Pulse 75   Temp 97.5 °F (36.4 °C)   Ht 167.6 cm (66\")   Wt 90.1 kg (198 lb 9.6 oz)   SpO2 100%   BMI 32.05 kg/m²     Body mass index is 32.05 kg/m².     Physical Exam  Vitals reviewed.   Constitutional:       General: She is not in acute distress.     Appearance: She is well-developed.   HENT:      Head: Normocephalic and atraumatic.   Pulmonary:      Effort: Pulmonary effort is normal. No respiratory distress.   Abdominal:      General: Abdomen is flat. Bowel sounds are normal. There is no distension.      Palpations: Abdomen is soft.      Tenderness: There is no abdominal tenderness.   Skin:     General: Skin is dry.      Coloration: Skin is not pale.   Neurological:      Mental Status: She is alert and oriented to person, place, and time.   Psychiatric:         " Thought Content: Thought content normal.           Assessment and Plan    Diagnoses and all orders for this visit:    1. Autoimmune hepatitis (Primary)  -     CBC & Differential; Future  -     Hepatic Function Panel; Future    2. Other cirrhosis of liver    3. Gastroesophageal reflux disease with esophagitis without hemorrhage    4. History of colon polyps         Discussion  Patient presents today for follow-up of GERD with esophagitis, autoimmune hepatitis, and cirrhosis.  Symptoms are stable and controlled and will continue current treatment.  She has had normalization of transaminases over the last 18 months, we will continue azathioprine at this time and if liver enzymes remain stable over the next 6 to 12 months, we may be able to continue discontinuing treatment.          Follow Up   Return in about 6 months (around 10/24/2023).    Patient Instructions   For autoimmune hepatitis, continue azathioprine 50 mg once daily as prescribed.  Lab work will be due end of June 2023 for monitoring.    For monitoring of cirrhosis, ultrasound will be due October 2023.    For GERD with esophagitis, continue pantoprazole daily as prescribed.    For surveillance of colon polyps, colonoscopy recommended at 3-year interval, due December 2025.

## 2023-05-02 ENCOUNTER — OFFICE VISIT (OUTPATIENT)
Dept: CARDIOLOGY | Facility: CLINIC | Age: 73
End: 2023-05-02
Payer: MEDICARE

## 2023-05-02 VITALS
HEART RATE: 62 BPM | SYSTOLIC BLOOD PRESSURE: 132 MMHG | HEIGHT: 66 IN | BODY MASS INDEX: 31.66 KG/M2 | DIASTOLIC BLOOD PRESSURE: 90 MMHG | WEIGHT: 197 LBS

## 2023-05-02 DIAGNOSIS — I47.1 PAROXYSMAL SVT (SUPRAVENTRICULAR TACHYCARDIA): Primary | ICD-10-CM

## 2023-05-02 NOTE — LETTER
May 2, 2023       No Recipients    Patient: Esme Lemus   YOB: 1950   Date of Visit: 2023       Dear Landon Moreno MD,    Thank you for referring Esme Lemus to me for evaluation. Below is a copy of my consult note.    If you have questions, please do not hesitate to call me. I look forward to following Esme along with you.         Sincerely,        Luis Enrique Adam MD        CC:   No Recipients    Date of Office Visit: 2023  Encounter Provider: Luis Enrique Adam MD  Place of Service: [facility] CARDIOLOGY  Patient Name: Esme Lemus  :1950    Chief Compliant  Recurrent SVT    HPI: Esme Lemus is a 72 y.o. female who presents today for SVT    She is here for discussion regarding SVT    She has had three life time episodes, all of which have resulted in ER visits    She has symptoms of tachycardia and malaise    Ablation was discussed during previous admission, but she never pursued.           Past Medical History:   Diagnosis Date   • Abnormal blood chemistry    • Acid reflux disease    • Acute sinusitis    • Allergic rhinitis    • Anxiety disorder    • Arthritis    • AV david re-entry tachycardia    • BMI 34.0-34.9,adult    • Cancer     hysterectomy partial    • Cataract    • Deficiency of vitamin B12    • Elevated liver enzymes    • Elevated liver function tests    • Encounter for screening for osteoporosis    • Fatigue    • GERD (gastroesophageal reflux disease)    • Hiatal hernia    • High blood pressure    • High cholesterol    • History of sinusitis    • Hyperlipidemia    • Hypertension    • Knee pain    • Liver disease    • Lower back pain    • Lumbosacral spinal stenosis    • Menopause    • Metabolic syndrome    • Musculoskeletal back pain    • Osteoarthritis of both knees    • Osteopenia of multiple sites    • Paroxysmal supraventricular tachycardia    • Right flank discomfort    • Sciatica of right  side    • Sleep apnea     doesn't wear- bothers her with reflux   • Sleep disturbance    • Stomach ulcer    • Type 2 diabetes mellitus    • Visit for screening mammogram    • Weight loss counseling, encounter for        Past Surgical History:   Procedure Laterality Date   • COLONOSCOPY     • COLONOSCOPY Left 03/08/2021    Procedure: COLONOSCOPY;  Surgeon: Javier Toth MD;  Location: [facility];  Service: Gastroenterology;  Laterality: Left;   • COLONOSCOPY W/ POLYPECTOMY N/A 12/29/2022    Procedure: COLONOSCOPY WITH POLYPECTOMY;  Surgeon: Javier Toth MD;  Location[facility];  Service: Gastroenterology;  Laterality: N/A;  HEMORRHOIDS, POLYP   • ENDOSCOPY Left 03/08/2021    Procedure: ESOPHAGOGASTRODUODENOSCOPY;  Surgeon: Javier Toth MD;  Location[facility];  Service: Gastroenterology;  Laterality: Left;   • ENDOSCOPY N/A 12/29/2022    Procedure: ESOPHAGOGASTRODUODENOSCOPY WITH BIOPSY;  Surgeon: Javier Toth MD;  Location[facility];  Service: Gastroenterology;  Laterality: N/A;  ESOPHAGITIS, GASTRIC POLYPS   • HEMORROIDECTOMY     • HERNIA REPAIR     • HYSTERECTOMY      due to cancer   • INGUINAL HERNIA REPAIR     • KNEE SURGERY     • SINUS SURGERY     • TUBAL ABDOMINAL LIGATION     • UPPER GASTROINTESTINAL ENDOSCOPY         Social History     Socioeconomic History   • Marital status:    Tobacco Use   • Smoking status: Former   • Smokeless tobacco: Never   Vaping Use   • Vaping Use: Never used   Substance and Sexual Activity   • Alcohol use: Yes     Comment: social   • Drug use: Never   • Sexual activity: Defer       Family History   Problem Relation Age of Onset   • Alzheimer's disease Mother    • Kidney failure Father    • Alzheimer's disease Brother    • Lung cancer Brother    • Stomach cancer Brother    • Alcohol abuse Other    • Hypertension Other    • Arthritis Other    • Colon cancer Neg Hx    • Colon polyps Neg Hx    • Crohn's disease Neg Hx    •  Irritable bowel syndrome Neg Hx    • Ulcerative colitis Neg Hx        Review of Systems   Constitutional: Negative.   Cardiovascular: Negative.    Respiratory: Negative.    Gastrointestinal: Negative.        Allergies   Allergen Reactions   • Fish Oil Itching   • Rocuronium Anaphylaxis   • Shellfish-Derived Products Nausea And Vomiting and GI Intolerance         Current Outpatient Medications:   •  amLODIPine (NORVASC) 10 MG tablet, Take 1 tablet by mouth Daily., Disp: , Rfl:   •  Aspirin Buf,CaCarb-MgCarb-MgO, 81 MG tablet, Take 81 mg by mouth Daily., Disp: , Rfl:   •  atorvastatin (LIPITOR) 20 MG tablet, TAKE 1 TABLET DAILY, Disp: 90 tablet, Rfl: 3  •  azaTHIOprine (IMURAN) 50 MG tablet, TAKE 1 TABLET DAILY, Disp: 90 tablet, Rfl: 3  •  Diclofenac Sodium (VOLTAREN) 1 % gel gel, APPLY 4 G TOPICALLY TO THE APPROPRIATE AREA AS DIRECTED 4 TIMES A DAY AS NEEDED (MUSCLE PAIN)., Disp: 100 g, Rfl: 0  •  fexofenadine (ALLEGRA) 180 MG tablet, Take 1 tablet by mouth Daily., Disp: , Rfl:   •  fluticasone (FLONASE) 50 MCG/ACT nasal spray, 2 sprays into the nostril(s) as directed by provider Daily., Disp: , Rfl:   •  furosemide (LASIX) 20 MG tablet, Take 1 tablet by mouth Daily., Disp: , Rfl:   •  losartan (COZAAR) 50 MG tablet, Take 1 tablet by mouth Daily., Disp: , Rfl:   •  LUMIGAN 0.01 % ophthalmic drops, Administer 1 drop to both eyes Every Night., Disp: , Rfl:   •  metoprolol succinate XL (TOPROL-XL) 50 MG 24 hr tablet, TAKE 1 TABLET DAILY, Disp: 90 tablet, Rfl: 3  •  Multiple Vitamin (MULTIVITAMIN) tablet, Take 1 tablet by mouth Daily., Disp: , Rfl:   •  pantoprazole (PROTONIX) 40 MG EC tablet, TAKE 1 TABLET DAILY ONE HOUR BEFORE FIRST MEAL OF THE DAY, Disp: 90 tablet, Rfl: 3  •  VITAMIN B COMPLEX-C PO, Take  by mouth., Disp: , Rfl:   •  vitamin D3 125 MCG (5000 UT) capsule capsule, Take 1 capsule by mouth Daily., Disp: , Rfl:      Objective:     Vitals:    05/02/23 1047   BP: 132/90   Pulse: 62   Weight: 89.4 kg (197 lb)  "  Height: 167.6 cm (66\")     Body mass index is 31.8 kg/m².    PHYSICAL EXAM:    Vitals and nursing note reviewed.   Constitutional:       General: Not in acute distress.  Pulmonary:      Effort: Pulmonary effort is normal. No respiratory distress.   Cardiovascular:      Normal rate. Regular rhythm.   Edema:     Peripheral edema absent.   Skin:     General: Skin is warm and dry.   Neurological:      Mental Status: Alert and oriented to person, place, and time.   Psychiatric:         Behavior: Behavior normal.         Thought Content: Thought content normal.         Judgment: Judgment normal.             ECG 12 Lead    Date/Time: 5/2/2023 11:34 AM  Performed by: Luis Enrique Adam MD  Authorized by: Luis Enrique Adam MD   Comparison: compared with previous ECG from 2/14/2023  Rhythm: sinus rhythm        I reviewed her ER note from January.  SVT terminated after iv diltiazem      Assessment:       Diagnosis Plan   1. Paroxysmal SVT (supraventricular tachycardia)               Plan:       She has recurrent, moderately to highly symptomatic SVT    I offered ablation and discussed vagal maneuvers with the patient.     She is going to consider, but I don't think she is going to pursue ablation.      If she decides in the near future she would like ablation, I would be glad to do that for here    Otherwise, follow-up PRN        As always, it has been a pleasure to participate in your patient's care.      Sincerely,         Luis Enrique Adam MD    "

## 2023-05-02 NOTE — PROGRESS NOTES
Date of Office Visit: 2023  Encounter Provider: Luis Enrique Adam MD  Place of Service: [facility] CARDIOLOGY  Patient Name: Esme Lemus  :1950    Chief Compliant  Recurrent SVT    HPI: Esme Lemus is a 72 y.o. female who presents today for SVT    She is here for discussion regarding SVT    She has had three life time episodes, all of which have resulted in ER visits    She has symptoms of tachycardia and malaise    Ablation was discussed during previous admission, but she never pursued.           Past Medical History:   Diagnosis Date   • Abnormal blood chemistry    • Acid reflux disease    • Acute sinusitis    • Allergic rhinitis    • Anxiety disorder    • Arthritis    • AV david re-entry tachycardia    • BMI 34.0-34.9,adult    • Cancer     hysterectomy partial    • Cataract    • Deficiency of vitamin B12    • Elevated liver enzymes    • Elevated liver function tests    • Encounter for screening for osteoporosis    • Fatigue    • GERD (gastroesophageal reflux disease)    • Hiatal hernia    • High blood pressure    • High cholesterol    • History of sinusitis    • Hyperlipidemia    • Hypertension    • Knee pain    • Liver disease    • Lower back pain    • Lumbosacral spinal stenosis    • Menopause    • Metabolic syndrome    • Musculoskeletal back pain    • Osteoarthritis of both knees    • Osteopenia of multiple sites    • Paroxysmal supraventricular tachycardia    • Right flank discomfort    • Sciatica of right side    • Sleep apnea     doesn't wear- bothers her with reflux   • Sleep disturbance    • Stomach ulcer    • Type 2 diabetes mellitus    • Visit for screening mammogram    • Weight loss counseling, encounter for        Past Surgical History:   Procedure Laterality Date   • COLONOSCOPY     • COLONOSCOPY Left 2021    Procedure: COLONOSCOPY;  Surgeon: Javier Toth MD;  Location: [facility] OR;  Service: Gastroenterology;  Laterality:  Left;   • COLONOSCOPY W/ POLYPECTOMY N/A 12/29/2022    Procedure: COLONOSCOPY WITH POLYPECTOMY;  Surgeon: Javier Toth MD;  Location: [facility];  Service: Gastroenterology;  Laterality: N/A;  HEMORRHOIDS, POLYP   • ENDOSCOPY Left 03/08/2021    Procedure: ESOPHAGOGASTRODUODENOSCOPY;  Surgeon: Javier Toth MD;  Location: [facility];  Service: Gastroenterology;  Laterality: Left;   • ENDOSCOPY N/A 12/29/2022    Procedure: ESOPHAGOGASTRODUODENOSCOPY WITH BIOPSY;  Surgeon: Javier Toth MD;  Location: [facility];  Service: Gastroenterology;  Laterality: N/A;  ESOPHAGITIS, GASTRIC POLYPS   • HEMORROIDECTOMY     • HERNIA REPAIR     • HYSTERECTOMY      due to cancer   • INGUINAL HERNIA REPAIR     • KNEE SURGERY     • SINUS SURGERY     • TUBAL ABDOMINAL LIGATION     • UPPER GASTROINTESTINAL ENDOSCOPY         Social History     Socioeconomic History   • Marital status:    Tobacco Use   • Smoking status: Former   • Smokeless tobacco: Never   Vaping Use   • Vaping Use: Never used   Substance and Sexual Activity   • Alcohol use: Yes     Comment: social   • Drug use: Never   • Sexual activity: Defer       Family History   Problem Relation Age of Onset   • Alzheimer's disease Mother    • Kidney failure Father    • Alzheimer's disease Brother    • Lung cancer Brother    • Stomach cancer Brother    • Alcohol abuse Other    • Hypertension Other    • Arthritis Other    • Colon cancer Neg Hx    • Colon polyps Neg Hx    • Crohn's disease Neg Hx    • Irritable bowel syndrome Neg Hx    • Ulcerative colitis Neg Hx        Review of Systems   Constitutional: Negative.   Cardiovascular: Negative.    Respiratory: Negative.    Gastrointestinal: Negative.        Allergies   Allergen Reactions   • Fish Oil Itching   • Rocuronium Anaphylaxis   • Shellfish-Derived Products Nausea And Vomiting and GI Intolerance         Current Outpatient Medications:   •  amLODIPine (NORVASC) 10 MG tablet, Take 1 tablet by  "mouth Daily., Disp: , Rfl:   •  Aspirin Buf,CaCarb-MgCarb-MgO, 81 MG tablet, Take 81 mg by mouth Daily., Disp: , Rfl:   •  atorvastatin (LIPITOR) 20 MG tablet, TAKE 1 TABLET DAILY, Disp: 90 tablet, Rfl: 3  •  azaTHIOprine (IMURAN) 50 MG tablet, TAKE 1 TABLET DAILY, Disp: 90 tablet, Rfl: 3  •  Diclofenac Sodium (VOLTAREN) 1 % gel gel, APPLY 4 G TOPICALLY TO THE APPROPRIATE AREA AS DIRECTED 4 TIMES A DAY AS NEEDED (MUSCLE PAIN)., Disp: 100 g, Rfl: 0  •  fexofenadine (ALLEGRA) 180 MG tablet, Take 1 tablet by mouth Daily., Disp: , Rfl:   •  fluticasone (FLONASE) 50 MCG/ACT nasal spray, 2 sprays into the nostril(s) as directed by provider Daily., Disp: , Rfl:   •  furosemide (LASIX) 20 MG tablet, Take 1 tablet by mouth Daily., Disp: , Rfl:   •  losartan (COZAAR) 50 MG tablet, Take 1 tablet by mouth Daily., Disp: , Rfl:   •  LUMIGAN 0.01 % ophthalmic drops, Administer 1 drop to both eyes Every Night., Disp: , Rfl:   •  metoprolol succinate XL (TOPROL-XL) 50 MG 24 hr tablet, TAKE 1 TABLET DAILY, Disp: 90 tablet, Rfl: 3  •  Multiple Vitamin (MULTIVITAMIN) tablet, Take 1 tablet by mouth Daily., Disp: , Rfl:   •  pantoprazole (PROTONIX) 40 MG EC tablet, TAKE 1 TABLET DAILY ONE HOUR BEFORE FIRST MEAL OF THE DAY, Disp: 90 tablet, Rfl: 3  •  VITAMIN B COMPLEX-C PO, Take  by mouth., Disp: , Rfl:   •  vitamin D3 125 MCG (5000 UT) capsule capsule, Take 1 capsule by mouth Daily., Disp: , Rfl:       Objective:     Vitals:    05/02/23 1047   BP: 132/90   Pulse: 62   Weight: 89.4 kg (197 lb)   Height: 167.6 cm (66\")     Body mass index is 31.8 kg/m².    PHYSICAL EXAM:    Vitals and nursing note reviewed.   Constitutional:       General: Not in acute distress.  Pulmonary:      Effort: Pulmonary effort is normal. No respiratory distress.   Cardiovascular:      Normal rate. Regular rhythm.   Edema:     Peripheral edema absent.   Skin:     General: Skin is warm and dry.   Neurological:      Mental Status: Alert and oriented to person, " place, and time.   Psychiatric:         Behavior: Behavior normal.         Thought Content: Thought content normal.         Judgment: Judgment normal.             ECG 12 Lead    Date/Time: 5/2/2023 11:34 AM  Performed by: Luis Enrique Adam MD  Authorized by: Luis Enrique Adam MD   Comparison: compared with previous ECG from 2/14/2023  Rhythm: sinus rhythm        I reviewed her ER note from January.  SVT terminated after iv diltiazem      Assessment:       Diagnosis Plan   1. Paroxysmal SVT (supraventricular tachycardia)               Plan:       She has recurrent, moderately to highly symptomatic SVT    I offered ablation and discussed vagal maneuvers with the patient.     She is going to consider, but I don't think she is going to pursue ablation.      If she decides in the near future she would like ablation, I would be glad to do that for here    Otherwise, follow-up PRN        As always, it has been a pleasure to participate in your patient's care.      Sincerely,         Luis Enrique Adam MD

## 2023-05-19 ENCOUNTER — TELEPHONE (OUTPATIENT)
Dept: INTERNAL MEDICINE | Facility: CLINIC | Age: 73
End: 2023-05-19

## 2023-05-19 NOTE — TELEPHONE ENCOUNTER
"  Caller: Esme Lemus \"PAT\"    Relationship to patient: Self    Patient is needing:  PATIENT IS NEEDING TO R/S HER LAB ON 5/26 DUE TO BEING OUT OF TOWN. SHE WOULD LIKE TO COME IN ON TUESDAY 5/30 INSTEAD. PLEASE CALL. [phone]  "

## 2023-05-30 DIAGNOSIS — E11.9 TYPE 2 DIABETES MELLITUS WITHOUT COMPLICATION, WITHOUT LONG-TERM CURRENT USE OF INSULIN: ICD-10-CM

## 2023-05-30 DIAGNOSIS — E78.00 HYPERCHOLESTEROLEMIA: Primary | ICD-10-CM

## 2023-05-31 LAB
ALBUMIN SERPL-MCNC: 4.3 G/DL (ref 3.7–4.7)
ALBUMIN/CREAT UR: <6 MG/G CREAT (ref 0–29)
ALBUMIN/GLOB SERPL: 1.6 {RATIO} (ref 1.2–2.2)
ALP SERPL-CCNC: 98 IU/L (ref 44–121)
ALT SERPL-CCNC: 24 IU/L (ref 0–32)
AST SERPL-CCNC: 29 IU/L (ref 0–40)
BILIRUB SERPL-MCNC: 1.6 MG/DL (ref 0–1.2)
BUN SERPL-MCNC: 13 MG/DL (ref 8–27)
BUN/CREAT SERPL: 15 (ref 12–28)
CALCIUM SERPL-MCNC: 10.1 MG/DL (ref 8.7–10.3)
CHLORIDE SERPL-SCNC: 104 MMOL/L (ref 96–106)
CHOLEST SERPL-MCNC: 194 MG/DL (ref 100–199)
CHOLEST/HDLC SERPL: 1.9 RATIO (ref 0–4.4)
CO2 SERPL-SCNC: 23 MMOL/L (ref 20–29)
CREAT SERPL-MCNC: 0.89 MG/DL (ref 0.57–1)
CREAT UR-MCNC: 49.4 MG/DL
EGFRCR SERPLBLD CKD-EPI 2021: 68 ML/MIN/1.73
GLOBULIN SER CALC-MCNC: 2.7 G/DL (ref 1.5–4.5)
GLUCOSE SERPL-MCNC: 103 MG/DL (ref 70–99)
HBA1C MFR BLD: 6.3 % (ref 4.8–5.6)
HDLC SERPL-MCNC: 103 MG/DL
LDLC SERPL CALC-MCNC: 79 MG/DL (ref 0–99)
MICROALBUMIN UR-MCNC: <3 UG/ML
POTASSIUM SERPL-SCNC: 4.8 MMOL/L (ref 3.5–5.2)
PROT SERPL-MCNC: 7 G/DL (ref 6–8.5)
SODIUM SERPL-SCNC: 141 MMOL/L (ref 134–144)
TRIGL SERPL-MCNC: 62 MG/DL (ref 0–149)
VLDLC SERPL CALC-MCNC: 12 MG/DL (ref 5–40)

## 2023-06-02 ENCOUNTER — OFFICE VISIT (OUTPATIENT)
Dept: INTERNAL MEDICINE | Facility: CLINIC | Age: 73
End: 2023-06-02

## 2023-06-02 ENCOUNTER — TELEPHONE (OUTPATIENT)
Dept: INTERNAL MEDICINE | Facility: CLINIC | Age: 73
End: 2023-06-02

## 2023-06-02 VITALS
DIASTOLIC BLOOD PRESSURE: 70 MMHG | WEIGHT: 197 LBS | SYSTOLIC BLOOD PRESSURE: 132 MMHG | TEMPERATURE: 97.8 F | HEIGHT: 66 IN | BODY MASS INDEX: 31.66 KG/M2

## 2023-06-02 DIAGNOSIS — K75.4 AUTOIMMUNE HEPATITIS: ICD-10-CM

## 2023-06-02 DIAGNOSIS — M17.11 OSTEOARTHRITIS OF RIGHT KNEE, UNSPECIFIED OSTEOARTHRITIS TYPE: ICD-10-CM

## 2023-06-02 DIAGNOSIS — E11.9 TYPE 2 DIABETES MELLITUS WITHOUT COMPLICATION, WITHOUT LONG-TERM CURRENT USE OF INSULIN: ICD-10-CM

## 2023-06-02 DIAGNOSIS — Z00.00 MEDICARE ANNUAL WELLNESS VISIT, SUBSEQUENT: Primary | ICD-10-CM

## 2023-06-02 DIAGNOSIS — E78.00 HYPERCHOLESTEROLEMIA: ICD-10-CM

## 2023-06-02 DIAGNOSIS — I10 PRIMARY HYPERTENSION: ICD-10-CM

## 2023-06-02 DIAGNOSIS — E53.8 B12 DEFICIENCY: ICD-10-CM

## 2023-06-02 DIAGNOSIS — E55.9 VITAMIN D DEFICIENCY: ICD-10-CM

## 2023-06-02 PROBLEM — Z12.11 COLON CANCER SCREENING: Status: RESOLVED | Noted: 2021-02-05 | Resolved: 2023-06-02

## 2023-06-02 PROBLEM — M79.10 MYALGIA: Status: RESOLVED | Noted: 2019-01-18 | Resolved: 2023-06-02

## 2023-06-02 PROBLEM — R51.9 HEADACHE: Status: RESOLVED | Noted: 2020-05-27 | Resolved: 2023-06-02

## 2023-06-02 PROBLEM — K44.9 HIATAL HERNIA: Status: RESOLVED | Noted: 2018-10-09 | Resolved: 2023-06-02

## 2023-06-02 PROBLEM — R76.8 ELEVATED ANTINUCLEAR ANTIBODY (ANA) LEVEL: Status: RESOLVED | Noted: 2019-01-22 | Resolved: 2023-06-02

## 2023-06-02 PROBLEM — M54.50 LOW BACK PAIN: Status: RESOLVED | Noted: 2017-04-03 | Resolved: 2023-06-02

## 2023-06-02 PROBLEM — R06.02 SHORTNESS OF BREATH: Status: RESOLVED | Noted: 2019-08-12 | Resolved: 2023-06-02

## 2023-06-02 PROBLEM — R10.11 RIGHT UPPER QUADRANT PAIN: Status: RESOLVED | Noted: 2019-11-14 | Resolved: 2023-06-02

## 2023-06-02 PROBLEM — R19.7 DIARRHEA: Status: RESOLVED | Noted: 2018-11-01 | Resolved: 2023-06-02

## 2023-06-02 PROBLEM — M54.9 BACKACHE: Status: RESOLVED | Noted: 2018-04-16 | Resolved: 2023-06-02

## 2023-06-02 PROBLEM — R10.13 EPIGASTRIC PAIN: Status: RESOLVED | Noted: 2022-11-07 | Resolved: 2023-06-02

## 2023-06-02 PROBLEM — K62.5 RECTAL BLEEDING: Status: RESOLVED | Noted: 2022-11-07 | Resolved: 2023-06-02

## 2023-06-02 RX ORDER — CYCLOBENZAPRINE HCL 5 MG
5 TABLET ORAL 3 TIMES DAILY PRN
Qty: 30 TABLET | Refills: 0 | Status: SHIPPED | OUTPATIENT
Start: 2023-06-02

## 2023-06-02 RX ORDER — CYCLOBENZAPRINE HCL 5 MG
5 TABLET ORAL 3 TIMES DAILY PRN
Qty: 30 TABLET | Refills: 0 | Status: SHIPPED | OUTPATIENT
Start: 2023-06-02 | End: 2023-06-02 | Stop reason: SDUPTHER

## 2023-06-02 RX ORDER — CYANOCOBALAMIN 1000 UG/ML
1000 INJECTION, SOLUTION INTRAMUSCULAR; SUBCUTANEOUS
Status: SHIPPED | OUTPATIENT
Start: 2023-06-02

## 2023-06-02 RX ADMIN — CYANOCOBALAMIN 1000 MCG: 1000 INJECTION, SOLUTION INTRAMUSCULAR; SUBCUTANEOUS at 11:15

## 2023-06-02 NOTE — PROGRESS NOTES
The ABCs of the Annual Wellness Visit  Subsequent Medicare Wellness Visit    Subjective    Esme Lemus is a 73 y.o. female who presents for a Subsequent Medicare Wellness Visit. Has been having some posterior neck pain that is worse with position changes. Can be tender as well. Off and on for a month. No CP or SOA.     The following portions of the patient's history were reviewed and   updated as appropriate: allergies, current medications, past family history, past medical history, past social history, past surgical history and problem list.    Compared to one year ago, the patient feels her physical   health is the same.    Compared to one year ago, the patient feels her mental   health is the same.    Recent Hospitalizations:  She was not admitted to the hospital during the last year.       Current Medical Providers:  Patient Care Team:  Alice Lino PA-C as PCP - General (Physician Assistant)  Javier Toth MD as Consulting Physician (Gastroenterology)  Shaye Coates APRN as Nurse Practitioner (Gastroenterology)  Matheus Moreno MD as Consulting Physician (Pulmonary Disease)  Pattie Rangel MD as Consulting Physician (Orthopedic Surgery)  Oly Talavera APRN as Nurse Practitioner (Nurse Practitioner)  Keila Bragg APRN as Nurse Practitioner (Gastroenterology)    Outpatient Medications Prior to Visit   Medication Sig Dispense Refill   • amLODIPine (NORVASC) 10 MG tablet Take 1 tablet by mouth Daily.     • Aspirin Buf,CaCarb-MgCarb-MgO, 81 MG tablet Take 81 mg by mouth Daily.     • atorvastatin (LIPITOR) 20 MG tablet TAKE 1 TABLET DAILY 90 tablet 3   • azaTHIOprine (IMURAN) 50 MG tablet TAKE 1 TABLET DAILY 90 tablet 3   • fexofenadine (ALLEGRA) 180 MG tablet Take 1 tablet by mouth Daily.     • fluticasone (FLONASE) 50 MCG/ACT nasal spray 2 sprays into the nostril(s) as directed by provider Daily.     • furosemide (LASIX) 20 MG tablet Take 1 tablet by mouth Daily.     •  losartan (COZAAR) 50 MG tablet Take 1 tablet by mouth Daily.     • LUMIGAN 0.01 % ophthalmic drops Administer 1 drop to both eyes Every Night.     • metoprolol succinate XL (TOPROL-XL) 50 MG 24 hr tablet TAKE 1 TABLET DAILY 90 tablet 3   • Multiple Vitamin (MULTIVITAMIN) tablet Take 1 tablet by mouth Daily.     • pantoprazole (PROTONIX) 40 MG EC tablet TAKE 1 TABLET DAILY ONE HOUR BEFORE FIRST MEAL OF THE DAY 90 tablet 3   • VITAMIN B COMPLEX-C PO Take  by mouth.     • vitamin D3 125 MCG (5000 UT) capsule capsule Take 1 capsule by mouth Daily.     • Diclofenac Sodium (VOLTAREN) 1 % gel gel APPLY 4 G TOPICALLY TO THE APPROPRIATE AREA AS DIRECTED 4 TIMES A DAY AS NEEDED (MUSCLE PAIN). 100 g 0     No facility-administered medications prior to visit.       No opioid medication identified on active medication list. I have reviewed chart for other potential  high risk medication/s and harmful drug interactions in the elderly.          Aspirin is on active medication list. Aspirin use is indicated based on review of current medical condition/s. Pros and cons of this therapy have been discussed today. Benefits of this medication outweigh potential harm.  Patient has been encouraged to continue taking this medication.  .      Patient Active Problem List   Diagnosis   • Gastroesophageal reflux disease   • History of Nissen fundoplication   • History of colon polyps   • Allergic rhinitis   • Anxiety disorder   • AV david re-entry tachycardia   • Cobalamin deficiency   • Delayed gastric emptying   • Diastolic dysfunction   • Early satiety   • Edema   • Fatigue   • Fatty liver   • Hypercholesterolemia   • Hypertensive disorder   • Incisional hernia   • Knee pain   • Lesion of liver   • Lumbosacral stenosis   • Metabolic syndrome   • Obesity with body mass index 30 or greater   • Osteoarthritis of right knee   • Osteopenia   • Paroxysmal SVT (supraventricular tachycardia)   • Sciatica of right side   • Sleep apnea   • Type 2  "diabetes mellitus   • Esophagitis   • Gastric ulcer   • Autoimmune hepatitis   • Other cirrhosis of liver     Advance Care Planning   Advance Care Planning     Advance Directive is not on file.  ACP discussion was declined by the patient. Patient does not have an advance directive, information provided.     Objective    Vitals:    23 1023   BP: 132/70   Temp: 97.8 °F (36.6 °C)   Weight: 89.4 kg (197 lb)   Height: 167.6 cm (65.98\")     Estimated body mass index is 31.81 kg/m² as calculated from the following:    Height as of this encounter: 167.6 cm (65.98\").    Weight as of this encounter: 89.4 kg (197 lb).    BMI is >= 30 and <35. (Class 1 Obesity). The following options were offered after discussion;: nutrition counseling/recommendations      Does the patient have evidence of cognitive impairment? No    Lab Results   Component Value Date    CHLPL 194 2023    TRIG 62 2023     2023    LDL 79 2023    VLDL 12 2023    HGBA1C 6.3 (H) 2023        HEALTH RISK ASSESSMENT    Smoking Status:  Social History     Tobacco Use   Smoking Status Former   Smokeless Tobacco Never     Alcohol Consumption:  Social History     Substance and Sexual Activity   Alcohol Use Yes    Comment: social     Fall Risk Screen:    CHUCKIEADI Fall Risk Assessment was completed, and patient is at LOW risk for falls.Assessment completed on:2023    Depression Screenin/2/2023    10:28 AM   PHQ-2/PHQ-9 Depression Screening   Little Interest or Pleasure in Doing Things 0-->not at all   Feeling Down, Depressed or Hopeless 0-->not at all   PHQ-9: Brief Depression Severity Measure Score 0       Health Habits and Functional and Cognitive Screenin/2/2023    10:27 AM   Functional & Cognitive Status   Do you have difficulty preparing food and eating? No   Do you have difficulty bathing yourself, getting dressed or grooming yourself? No   Do you have difficulty using the toilet? No   Do you have " difficulty moving around from place to place? No   Do you have trouble with steps or getting out of a bed or a chair? No   Current Diet Well Balanced Diet   Dental Exam Up to date   Eye Exam Up to date   Exercise (times per week) 5 times per week   Current Exercises Include Walking   Do you need help using the phone?  No   Are you deaf or do you have serious difficulty hearing?  No   Do you need help with transportation? No   Do you need help shopping? No   Do you need help preparing meals?  No   Do you need help with housework?  No   Do you need help with laundry? No   Do you need help taking your medications? No   Do you need help managing money? No   Do you ever drive or ride in a car without wearing a seat belt? No       Age-appropriate Screening Schedule:  Refer to the list below for future screening recommendations based on patient's age, sex and/or medical conditions. Orders for these recommended tests are listed in the plan section. The patient has been provided with a written plan.    Health Maintenance   Topic Date Due   • TDAP/TD VACCINES (1 - Tdap) Never done   • ZOSTER VACCINE (1 of 2) Never done   • Hepatitis B (3 of 3 - Risk 3-dose series) 01/26/2023   • INFLUENZA VACCINE  08/01/2023   • HEMOGLOBIN A1C  11/30/2023   • DXA SCAN  12/06/2023   • DIABETIC EYE EXAM  12/08/2023   • LIPID PANEL  05/30/2024   • URINE MICROALBUMIN  05/30/2024   • ANNUAL WELLNESS VISIT  06/02/2024   • DIABETIC FOOT EXAM  06/02/2024   • MAMMOGRAM  01/04/2025   • COLORECTAL CANCER SCREENING  12/29/2025   • HEPATITIS C SCREENING  Completed   • COVID-19 Vaccine  Completed   • Pneumococcal Vaccine 65+  Completed                  CMS Preventative Services Quick Reference  Risk Factors Identified During Encounter  Immunizations Discussed/Encouraged: Tdap and Shingrix  The above risks/problems have been discussed with the patient.  Pertinent information has been shared with the patient in the After Visit Summary.  An After Visit  "Summary and PPPS were made available to the patient.    Follow Up:   Next Medicare Wellness visit to be scheduled in 1 year.       Additional E&M Note during same encounter follows:  Patient has multiple medical problems which are significant and separately identifiable that require additional work above and beyond the Medicare Wellness Visit.      Chief Complaint  Medicare Wellness-subsequent    Subjective        HPI  Esme Lemus is also being seen today for          Objective   Vital Signs:  /70   Temp 97.8 °F (36.6 °C)   Ht 167.6 cm (65.98\")   Wt 89.4 kg (197 lb)   BMI 31.81 kg/m²     Physical Exam  Vitals reviewed.   Constitutional:       Appearance: She is well-developed.   HENT:      Head: Normocephalic and atraumatic.   Neck:      Vascular: No carotid bruit.   Cardiovascular:      Rate and Rhythm: Normal rate and regular rhythm.      Pulses:           Dorsalis pedis pulses are 2+ on the right side and 2+ on the left side.        Posterior tibial pulses are 2+ on the right side and 2+ on the left side.      Heart sounds: Normal heart sounds, S1 normal and S2 normal.   Pulmonary:      Effort: Pulmonary effort is normal.      Breath sounds: Normal breath sounds.   Feet:      Right foot:      Protective Sensation: 6 sites tested. 4 sites sensed.      Skin integrity: Skin integrity normal.      Left foot:      Protective Sensation: 6 sites tested. 6 sites sensed.      Skin integrity: Skin integrity normal.   Skin:     General: Skin is warm.   Neurological:      Mental Status: She is alert.   Psychiatric:         Behavior: Behavior normal.           Assessment and Plan   Diagnoses and all orders for this visit:    1. Medicare annual wellness visit, subsequent (Primary)    2. Autoimmune hepatitis  -     Comprehensive Metabolic Panel; Future    3. Hypercholesterolemia  -     Lipid Panel With / Chol / HDL Ratio; Future    4. Primary hypertension    5. Type 2 diabetes mellitus without complication, " without long-term current use of insulin  -     Hemoglobin A1c; Future  -     TSH; Future    6. Osteoarthritis of right knee, unspecified osteoarthritis type  -     Diclofenac Sodium (VOLTAREN) 1 % gel gel; Apply 4 g topically to the appropriate area as directed 4 (Four) Times a Day As Needed (joint pain).  Dispense: 350 g; Refill: 1    7. B12 deficiency  -     Vitamin B12; Future  -     cyanocobalamin injection 1,000 mcg    8. Vitamin D deficiency  -     Vitamin D,25-Hydroxy; Future    Other orders  -     cyclobenzaprine (FLEXERIL) 5 MG tablet; Take 1 tablet by mouth 3 (Three) Times a Day As Needed for Muscle Spasms.  Dispense: 30 tablet; Refill: 0      Reiterated proper foot care and wearing shoes at all times. A1c is well controlled. BP is controlled. Liver is stable. Lipids to goal. FUP in 6 mo with lab prior.        Follow Up   Return in about 6 months (around 12/2/2023) for Lab Appt Before FUP.  Patient was given instructions and counseling regarding her condition or for health maintenance advice. Please see specific information pulled into the AVS if appropriate.

## 2023-06-02 NOTE — TELEPHONE ENCOUNTER
"  Caller: Esme Lemus \"PAT\"    Relationship: Self    Best call back number: [phone]    What was the call regarding: PRESCRIPTIONS FROM TODAY'S VISIT SHOULD GO TO LOCAL PHARMACY NOT THE [facility]IL DEL[facility]ERY [facility]ARM[facility].     [facility]/pharmacy #[id number] - [address] AT [address] - [phone] [facility] [phone]   [phone]    "

## 2023-08-01 RX ORDER — PANTOPRAZOLE SODIUM 40 MG/1
TABLET, DELAYED RELEASE ORAL
Qty: 90 TABLET | Refills: 3 | Status: SHIPPED | OUTPATIENT
Start: 2023-08-01

## 2023-08-03 ENCOUNTER — TELEPHONE (OUTPATIENT)
Dept: INTERNAL MEDICINE | Facility: CLINIC | Age: 73
End: 2023-08-03

## 2023-08-03 ENCOUNTER — APPOINTMENT (OUTPATIENT)
Dept: GENERAL RADIOLOGY | Facility: HOSPITAL | Age: 73
End: 2023-08-03
Payer: MEDICARE

## 2023-08-03 ENCOUNTER — HOSPITAL ENCOUNTER (EMERGENCY)
Facility: HOSPITAL | Age: 73
Discharge: HOME | End: 2023-08-03
Attending: EMERGENCY MEDICINE
Payer: MEDICARE

## 2023-08-03 ENCOUNTER — APPOINTMENT (OUTPATIENT)
Dept: CT IMAGING | Facility: HOSPITAL | Age: 73
End: 2023-08-03
Payer: MEDICARE

## 2023-08-03 VITALS
RESPIRATION RATE: 20 BRPM | OXYGEN SATURATION: 98 % | HEART RATE: 81 BPM | SYSTOLIC BLOOD PRESSURE: 155 MMHG | HEIGHT: 66 IN | TEMPERATURE: 99 F | BODY MASS INDEX: 33.75 KG/M2 | WEIGHT: 210 LBS | DIASTOLIC BLOOD PRESSURE: 73 MMHG

## 2023-08-03 DIAGNOSIS — R07.9 CHEST PAIN, UNSPECIFIED TYPE: ICD-10-CM

## 2023-08-03 DIAGNOSIS — R10.9 LEFT FLANK PAIN: Primary | ICD-10-CM

## 2023-08-03 LAB
ALBUMIN SERPL-MCNC: 4.2 G/DL (ref 3.5–5.2)
ALBUMIN/GLOB SERPL: 1.5 G/DL
ALP SERPL-CCNC: 88 U/L (ref 39–117)
ALT SERPL W P-5'-P-CCNC: 35 U/L (ref 1–33)
ANION GAP SERPL CALCULATED.3IONS-SCNC: 9 MMOL/L (ref 5–15)
AST SERPL-CCNC: 53 U/L (ref 1–32)
BASOPHILS # BLD AUTO: 0.01 10*3/MM3 (ref 0–0.2)
BASOPHILS NFR BLD AUTO: 0.3 % (ref 0–1.5)
BILIRUB SERPL-MCNC: 1.2 MG/DL (ref 0–1.2)
BUN SERPL-MCNC: 11 MG/DL (ref 8–23)
BUN/CREAT SERPL: 10.6 (ref 7–25)
CALCIUM SPEC-SCNC: 9.8 MG/DL (ref 8.6–10.5)
CHLORIDE SERPL-SCNC: 105 MMOL/L (ref 98–107)
CO2 SERPL-SCNC: 24 MMOL/L (ref 22–29)
CREAT SERPL-MCNC: 1.04 MG/DL (ref 0.57–1)
DEPRECATED RDW RBC AUTO: 40.7 FL (ref 37–54)
EGFRCR SERPLBLD CKD-EPI 2021: 56.5 ML/MIN/1.73
EOSINOPHIL # BLD AUTO: 0 10*3/MM3 (ref 0–0.4)
EOSINOPHIL NFR BLD AUTO: 0 % (ref 0.3–6.2)
ERYTHROCYTE [DISTWIDTH] IN BLOOD BY AUTOMATED COUNT: 12.4 % (ref 12.3–15.4)
GEN 5 2HR TROPONIN T REFLEX: 6 NG/L
GLOBULIN UR ELPH-MCNC: 2.8 GM/DL
GLUCOSE SERPL-MCNC: 85 MG/DL (ref 65–99)
HCT VFR BLD AUTO: 42.8 % (ref 34–46.6)
HGB BLD-MCNC: 14.5 G/DL (ref 12–15.9)
HOLD SPECIMEN: NORMAL
HOLD SPECIMEN: NORMAL
LYMPHOCYTES # BLD AUTO: 1.09 10*3/MM3 (ref 0.7–3.1)
LYMPHOCYTES NFR BLD AUTO: 28.8 % (ref 19.6–45.3)
MCH RBC QN AUTO: 30.9 PG (ref 26.6–33)
MCHC RBC AUTO-ENTMCNC: 33.9 G/DL (ref 31.5–35.7)
MCV RBC AUTO: 91.3 FL (ref 79–97)
MONOCYTES # BLD AUTO: 0.29 10*3/MM3 (ref 0.1–0.9)
MONOCYTES NFR BLD AUTO: 7.7 % (ref 5–12)
NEUTROPHILS NFR BLD AUTO: 2.39 10*3/MM3 (ref 1.7–7)
NEUTROPHILS NFR BLD AUTO: 62.9 % (ref 42.7–76)
PLATELET # BLD AUTO: 144 10*3/MM3 (ref 140–450)
PMV BLD AUTO: 9.5 FL (ref 6–12)
POTASSIUM SERPL-SCNC: 3.6 MMOL/L (ref 3.5–5.2)
PROT SERPL-MCNC: 7 G/DL (ref 6–8.5)
QT INTERVAL: 521 MS
RBC # BLD AUTO: 4.69 10*6/MM3 (ref 3.77–5.28)
SODIUM SERPL-SCNC: 138 MMOL/L (ref 136–145)
TROPONIN T DELTA: -1 NG/L
TROPONIN T SERPL HS-MCNC: 7 NG/L
WBC NRBC COR # BLD: 3.79 10*3/MM3 (ref 3.4–10.8)
WHOLE BLOOD HOLD COAG: NORMAL
WHOLE BLOOD HOLD SPECIMEN: NORMAL

## 2023-08-03 PROCEDURE — 99285 EMERGENCY DEPT VISIT HI MDM: CPT

## 2023-08-03 PROCEDURE — 71045 X-RAY EXAM CHEST 1 VIEW: CPT

## 2023-08-03 PROCEDURE — 71275 CT ANGIOGRAPHY CHEST: CPT

## 2023-08-03 PROCEDURE — 36415 COLL VENOUS BLD VENIPUNCTURE: CPT

## 2023-08-03 PROCEDURE — 93010 ELECTROCARDIOGRAM REPORT: CPT | Performed by: INTERNAL MEDICINE

## 2023-08-03 PROCEDURE — 84484 ASSAY OF TROPONIN QUANT: CPT | Performed by: EMERGENCY MEDICINE

## 2023-08-03 PROCEDURE — 25510000001 IOPAMIDOL PER 1 ML: Performed by: EMERGENCY MEDICINE

## 2023-08-03 PROCEDURE — 93005 ELECTROCARDIOGRAM TRACING: CPT

## 2023-08-03 PROCEDURE — 85025 COMPLETE CBC W/AUTO DIFF WBC: CPT | Performed by: EMERGENCY MEDICINE

## 2023-08-03 PROCEDURE — 80053 COMPREHEN METABOLIC PANEL: CPT | Performed by: EMERGENCY MEDICINE

## 2023-08-03 PROCEDURE — 93005 ELECTROCARDIOGRAM TRACING: CPT | Performed by: EMERGENCY MEDICINE

## 2023-08-03 RX ORDER — LIDOCAINE 50 MG/G
1 PATCH TOPICAL EVERY 24 HOURS
Qty: 10 PATCH | Refills: 0 | Status: SHIPPED | OUTPATIENT
Start: 2023-08-03 | End: 2023-08-03 | Stop reason: SDUPTHER

## 2023-08-03 RX ORDER — LIDOCAINE 50 MG/G
1 PATCH TOPICAL EVERY 24 HOURS
Qty: 10 PATCH | Refills: 0 | Status: SHIPPED | OUTPATIENT
Start: 2023-08-03

## 2023-08-03 RX ORDER — SODIUM CHLORIDE 0.9 % (FLUSH) 0.9 %
10 SYRINGE (ML) INJECTION AS NEEDED
Status: DISCONTINUED | OUTPATIENT
Start: 2023-08-03 | End: 2023-08-03 | Stop reason: HOSPADM

## 2023-08-03 RX ORDER — ASPIRIN 325 MG
325 TABLET ORAL ONCE
Status: COMPLETED | OUTPATIENT
Start: 2023-08-03 | End: 2023-08-03

## 2023-08-03 RX ADMIN — IOPAMIDOL 95 ML: 755 INJECTION, SOLUTION INTRAVENOUS at 17:23

## 2023-08-03 RX ADMIN — ASPIRIN 325 MG: 325 TABLET ORAL at 14:54

## 2023-08-03 NOTE — TELEPHONE ENCOUNTER
"  Caller: Esme Lemus \"PAT\"    Relationship to patient: Self    Best call back number: [phone]    Chief complaint: PATIENT CALLED AND STATED THAT SHE WAS HAVING PAIN IN HER CHEST AROUND HER HEART ON HER LEFT SIDE.     Patient directed to call 911 or go to their nearest emergency room.     Patient verbalized understanding: [x] Yes  [] No  If no, why?    Additional notes: [place] [employer].     "

## 2023-08-03 NOTE — ED PROVIDER NOTES
EMERGENCY DEPARTMENT ENCOUNTER    Room Number:  16/16  PCP: Alice Lino PA-C  Patient Care Team:  Alice Lino PA-C as PCP - General (Physician Assistant)  Javier Toth MD as Consulting Physician (Gastroenterology)  Shaye Coates APRN as Nurse Practitioner (Gastroenterology)  Matheus Moreno MD as Consulting Physician (Pulmonary Disease)  Pattie Rangel MD as Consulting Physician (Orthopedic Surgery)  Oly Talavera APRN as Nurse Practitioner (Nurse Practitioner)  Keila Bragg APRN as Nurse Practitioner (Gastroenterology)   Independent Historians: Patient    HPI:  Chief Complaint: Back and chest pain    A complete HPI/ROS/PMH/PSH/SH/FH are unobtainable due to: None    Chronic or social conditions impacting patient care (Social Determinants of Health): None  (Financial Resource Strain / Food Insecurity / Transportation Needs / Physical Activity / Stress / Social Connections / Intimate Partner Violence / Housing Stability)    Context: Esme Lemus is a 73 y.o. female who presents to the ED c/o acute chest and back pain.  Patient noted stepping out of bed yesterday morning with a twisting of her trunk that initiated some left flank pain.  The pain seems isolated to her left flank and left lower back and she noted it with certain movements all day yesterday.  Overnight however and today she started a pain that kind of wrapped around her left lateral chest and into her anterior chest along with the back pain.  Again it is only when she twists or turns a certain way and is not present at rest.  It was worrisome enough though for her that she decided to come in for evaluation.    Review of prior external notes (non-ED) -and- Review of prior external test results outside of this encounter: I reviewed the cardiology note the patient had an office visit in May of this year for SVT.  She was noted to have had 3 total lifetime episodes of SVT that brought her to the ER.  She had a  stress test done in March of this year that was negative.    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    Cobalamin deficiency 06/15/2018    Delayed gastric emptying 12/14/2018    Diastolic dysfunction 08/19/2019    Early satiety 11/01/2018    Edema 08/12/2019    Fatigue 08/25/2016    Fatty liver 09/11/2020    Hypercholesterolemia 02/10/2015    Hypertensive disorder 02/10/2015    Incisional hernia 02/12/2013    Knee pain 10/28/2016    Lesion of liver 12/17/2019    Lumbosacral stenosis 11/14/2017    Metabolic syndrome 08/25/2016    Obesity with body mass index 30 or greater 08/28/2017    Osteoarthritis of right knee 11/01/2016    Osteopenia 08/28/2017    Paroxysmal SVT (supraventricular tachycardia) 05/31/2016    Sciatica of right side 12/04/2017    Sleep apnea 02/10/2015    Type 2 diabetes mellitus 12/02/2016    Esophagitis 12/14/2018    Gastric ulcer 05/01/2017    Autoimmune hepatitis 08/24/2022    Other cirrhosis of liver 01/24/2023     Resolved Ambulatory Problems     Diagnosis Date Noted    Epigastric pain 02/05/2021    Colon cancer screening 02/05/2021    Diarrhea 11/01/2018    Elevated liver enzymes 06/15/2018    Elevated antinuclear antibody (AZALIA) level 01/22/2019    Headache 05/27/2020    Herpes zoster 09/20/2019    Hiatal hernia 10/09/2018    Backache 04/16/2018    Low back pain 04/03/2017    Menopause 08/29/2017    Myalgia 01/18/2019    Osteoarthritis of knee 05/02/2017    Shortness of breath 08/12/2019    Encounter for screening mammogram for malignant neoplasm of breast 03/17/2015    Right upper quadrant pain 11/14/2019    Rectal bleeding 11/07/2022    Epigastric pain 11/07/2022     Past  Medical History:   Diagnosis Date    Abnormal blood chemistry     Acid reflux disease     Acute sinusitis     Arthritis     BMI 34.0-34.9,adult     Cancer     Cataract     Deficiency of vitamin B12     Elevated liver function tests     Encounter for screening for osteoporosis     GERD (gastroesophageal reflux disease)     High blood pressure     High cholesterol     History of sinusitis     Hyperlipidemia     Hypertension     Liver disease     Lower back pain     Lumbosacral spinal stenosis     Musculoskeletal back pain     Osteoarthritis of both knees     Osteopenia of multiple sites     Paroxysmal supraventricular tachycardia     Right flank discomfort     Sleep disturbance     Stomach ulcer     Visit for screening mammogram     Weight loss counseling, encounter for          PAST SURGICAL HISTORY  Past Surgical History:   Procedure Laterality Date    COLONOSCOPY      COLONOSCOPY Left 03/08/2021    Procedure: COLONOSCOPY;  Surgeon: Javier Toth MD;  Location: [facility] [place];  Service: Gastroenterology;  Laterality: Left;    COLONOSCOPY W/ POLYPECTOMY N/A 12/29/2022    Procedure: COLONOSCOPY WITH POLYPECTOMY;  Surgeon: Javier Toth MD;  Location: [facility];  Service: Gastroenterology;  Laterality: N/A;  HEMORRHOIDS, POLYP    ENDOSCOPY Left 03/08/2021    Procedure: ESOPHAGOGASTRODUODENOSCOPY;  Surgeon: Javier Toth MD;  Location: [facility] [place] [place] OR;  Service: Gastroenterology;  Laterality: Left;    ENDOSCOPY N/A 12/29/2022    Procedure: ESOPHAGOGASTRODUODENOSCOPY WITH BIOPSY;  Surgeon: Javier Toth MD;  Location: [facility] [place] [place];  Service: Gastroenterology;  Laterality: N/A;  ESOPHAGITIS, GASTRIC POLYPS    HEMORROIDECTOMY      HERNIA REPAIR      HYSTERECTOMY      due to cancer    INGUINAL HERNIA REPAIR      KNEE SURGERY      SINUS SURGERY      TUBAL ABDOMINAL LIGATION      UPPER GASTROINTESTINAL ENDOSCOPY           FAMILY HISTORY  Family History   Problem Relation Age of Onset     Alzheimer's disease Mother     Kidney failure Father     Alzheimer's disease Brother     Lung cancer Brother     Stomach cancer Brother     Alcohol abuse Other     Hypertension Other     Arthritis Other     Colon cancer Neg Hx     Colon polyps Neg Hx     Crohn's disease Neg Hx     Irritable bowel syndrome Neg Hx     Ulcerative colitis Neg Hx          SOCIAL HISTORY  Social History     Socioeconomic History    Marital status:    Tobacco Use    Smoking status: Former    Smokeless tobacco: Never   Vaping Use    Vaping Use: Never used   Substance and Sexual Activity    Alcohol use: Yes     Comment: social    Drug use: Never    Sexual activity: Defer         ALLERGIES  Fish oil, Rocuronium, and Shellfish-derived products        REVIEW OF SYSTEMS  Review of Systems  Included in HPI  All systems reviewed and negative except for those discussed in HPI.      PHYSICAL EXAM    I have reviewed the triage vital signs and nursing notes.    ED Triage Vitals   Temp Heart Rate Resp BP SpO2   08/03/23 1347 08/03/23 1347 08/03/23 1347 08/03/23 1351 08/03/23 1347   99 øF (37.2 øC) 81 20 155/73 98 %      Temp src Heart Rate Source Patient Position BP Location FiO2 (%)   -- -- 08/03/23 1351 -- --     Lying         Physical Exam  GENERAL: Pleasant cooperative and conversant -American female wearing sunglasses because she said her eyes were dilated earlier today, alert, no acute distress  SKIN: Warm, dry, no flank rash  HENT: Normocephalic, atraumatic  CV: regular rhythm, regular rate, no murmurs or gallops  RESPIRATORY: normal effort, lungs clear, no wheezing  ABDOMEN: soft, nontender, nondistended, no rebound nor guarding  MUSCULOSKELETAL: no deformity, no edema, no left flank tenderness to palpation, no spinous tenderness to palpation or bony step-offs  NEURO: alert, moves all extremities, follows commands                                                               LAB RESULTS  Recent Results (from the past 24 hour(s))    ECG 12 Lead Chest Pain    Collection Time: 08/03/23  1:54 PM   Result Value Ref Range    QT Interval 521 ms   Comprehensive Metabolic Panel    Collection Time: 08/03/23  2:30 PM    Specimen: Blood   Result Value Ref Range    Glucose 85 65 - 99 mg/dL    BUN 11 8 - 23 mg/dL    Creatinine 1.04 (H) 0.57 - 1.00 mg/dL    Sodium 138 136 - 145 mmol/L    Potassium 3.6 3.5 - 5.2 mmol/L    Chloride 105 98 - 107 mmol/L    CO2 24.0 22.0 - 29.0 mmol/L    Calcium 9.8 8.6 - 10.5 mg/dL    Total Protein 7.0 6.0 - 8.5 g/dL    Albumin 4.2 3.5 - 5.2 g/dL    ALT (SGPT) 35 (H) 1 - 33 U/L    AST (SGOT) 53 (H) 1 - 32 U/L    Alkaline Phosphatase 88 39 - 117 U/L    Total Bilirubin 1.2 0.0 - 1.2 mg/dL    Globulin 2.8 gm/dL    A/G Ratio 1.5 g/dL    BUN/Creatinine Ratio 10.6 7.0 - 25.0    Anion Gap 9.0 5.0 - 15.0 mmol/L    eGFR 56.5 (L) >60.0 mL/min/1.73   High Sensitivity Troponin T    Collection Time: 08/03/23  2:30 PM    Specimen: Blood   Result Value Ref Range    HS Troponin T 7 <10 ng/L   Green Top (Gel)    Collection Time: 08/03/23  2:30 PM   Result Value Ref Range    Extra Tube Hold for add-ons.    Lavender Top    Collection Time: 08/03/23  2:30 PM   Result Value Ref Range    Extra Tube hold for add-on    Gold Top - SST    Collection Time: 08/03/23  2:30 PM   Result Value Ref Range    Extra Tube Hold for add-ons.    Light Blue Top    Collection Time: 08/03/23  2:30 PM   Result Value Ref Range    Extra Tube Hold for add-ons.    CBC Auto Differential    Collection Time: 08/03/23  2:30 PM    Specimen: Blood   Result Value Ref Range    WBC 3.79 3.40 - 10.80 10*3/mm3    RBC 4.69 3.77 - 5.28 10*6/mm3    Hemoglobin 14.5 12.0 - 15.9 g/dL    Hematocrit 42.8 34.0 - 46.6 %    MCV 91.3 79.0 - 97.0 fL    MCH 30.9 26.6 - 33.0 pg    MCHC 33.9 31.5 - 35.7 g/dL    RDW 12.4 12.3 - 15.4 %    RDW-SD 40.7 37.0 - 54.0 fl    MPV 9.5 6.0 - 12.0 fL    Platelets 144 140 - 450 10*3/mm3    Neutrophil % 62.9 42.7 - 76.0 %    Lymphocyte % 28.8 19.6 - 45.3 %     Monocyte % 7.7 5.0 - 12.0 %    Eosinophil % 0.0 (L) 0.3 - 6.2 %    Basophil % 0.3 0.0 - 1.5 %    Neutrophils, Absolute 2.39 1.70 - 7.00 10*3/mm3    Lymphocytes, Absolute 1.09 0.70 - 3.10 10*3/mm3    Monocytes, Absolute 0.29 0.10 - 0.90 10*3/mm3    Eosinophils, Absolute 0.00 0.00 - 0.40 10*3/mm3    Basophils, Absolute 0.01 0.00 - 0.20 10*3/mm3   High Sensitivity Troponin T 2Hr    Collection Time: 08/03/23  5:36 PM    Specimen: Blood   Result Value Ref Range    HS Troponin T 6 <10 ng/L    Troponin T Delta -1 >=-4 - <+4 ng/L       Ordered the above labs and independently reviewed the results.        RADIOLOGY  XR Chest 1 View    Result Date: 8/3/2023  XR CHEST 1 VW-  HISTORY: Female who is 73 years-old,  chest pain  TECHNIQUE: Frontal view of the chest  COMPARISON: 01/23/2023  FINDINGS: The heart size is normal. Aorta is tortuous, calcified. Pulmonary vasculature is unremarkable. No focal pulmonary consolidation, pleural effusion, or pneumothorax. No acute osseous process.      No focal pulmonary consolidation. Tortuous aorta. Follow-up as clinically indicated.  This report was finalized on 8/3/2023 3:14 PM by Dr. Julian Briscoe M.D.      CT Angiogram Chest    Result Date: 8/3/2023  CT ANGIOGRAM CHEST-  Radiation dose reduction techniques were utilized, including automated exposure control and exposure modulation based on body size.  CT scan of the chest performed with intravenous contrast, angiogram protocol to include coronal, sagittal and three-dimensional reconstruction.  Clinical: Chest and back pain  FINDINGS: No acute osseous abnormality. No aortic aneurysm or dissection. No pulmonary embolus.  Cardiac size upper limits of normal. Moderate size hiatal hernia. No mediastinal or hilar mass/lymphadenopathy. Lungs are clear. No pleural effusion  Limited images through the upper abdomen demonstrate a left upper pole renal cysts. The liver is small in size with surface irregularity, cirrhotic morphology. The  spleen is normal in size.  CONCLUSION: No pulmonary embolus, aortic aneurysm or dissection. Moderate size hiatal hernia. Lungs are clear. Left renal cysts with cirrhotic liver morphology.   This report was finalized on 8/3/2023 6:26 PM by Dr. Tian Fiore M.D.       I ordered the above noted radiological studies. Reviewed by me and discussed with radiologist.  See dictation for official radiology interpretation.      PROCEDURES    Procedures      MEDICATIONS GIVEN IN ER    Medications   aspirin tablet 325 mg (325 mg Oral Given 8/3/23 7864)   iopamidol (ISOVUE-370) 76 % injection 100 mL (95 mL Intravenous Given by Other 8/3/23 6033)         ORDERS PLACED DURING THIS VISIT:  Orders Placed This Encounter   Procedures    XR Chest 1 View    CT Angiogram Chest    [place] Draw    Comprehensive Metabolic Panel    High Sensitivity Troponin T    CBC Auto Differential    High Sensitivity Troponin T 2Hr    Undress & Gown    Continuous Pulse Oximetry    ECG 12 Lead Chest Pain    CBC & Differential    Green Top (Gel)    Lavender Top    Gold Top - SST    Light Blue Top         PROGRESS, DATA ANALYSIS, CONSULTS, AND MEDICAL DECISION MAKING    All labs have been independently interpreted by me.  All radiology studies have been reviewed by me and discussed with radiologist dictating the report.   EKG's independently viewed and interpreted by me.  Discussion below represents my analysis of pertinent findings related to patient's condition, differential diagnosis, treatment plan and final disposition.    This patient presents with chest pain that is more atypical in nature, and most likely secondary to benign causes. The differential diagnosis includes emergent causes like ACS, PE, pericarditis, myocarditis, thoracic aortic dissection, pneumothorax, pulmonary effusion, and pneumonia. It also includes more benign causes like bronchitis, gerd, esophageal spasm, anxiety/panic, pleurisy, costochondritis/chest wall pain, and biliary  colic. I have a low suspicion for ACS given nature of pain and risk factors (HEART score). Also thought low on the list of possibilities are acute PE (no pleuritic pain, no shortness of breath), pericarditis / myocarditis (no preceeding uri), thoracic aortic dissection (lack of risk factors and grossly equal radial pulses on exam), pneumothorax (clear to auscultation bilaterally to bases on exam), pneumonia or other acute infectious process (lack of infectious symptoms like cough). This patient's presentation is not consistent with emergent causes of chest pain at this time.   In addition to the history and physical exam already performed (and for more reassurance of exclusion of more worrisome causes of chest pain), plan to order CXR, serum labs including screening troponin, and EKG while monitoring patient and providing pain control as needed. Will reassess.    ED Course as of 08/03/23 2240   Thu Aug 03, 2023   1455 EKG ER MD interpretation   Time: 13:54  Rhythm and rate: Normal sinus rhythm at a rate of 71  Axis: Normal  P waves: Normal  QRS complexes: Normal  ST segments: no elevation nor depressions  T waves: Nearly flat T waves throughout  QTc over 500 [AR]   1518 I reviewed patient's chest x-ray and on my interpretation patient has no pulmonary infiltrates, normal heart size, and an irregular mediastinum but not widened [AR]   1518 I reviewed patient's laboratory studies which were normal including a troponin [AR]   1519 And [AR]   1848 I discussed all results with patient and answered all questions to the best of my ability. The patient was encouraged to follow up appropriately.      The patient also expressed understanding that at this time there is no evidence for a more malignant underlying process, but early in the process of an illness or injury, an emergency department workup can be falsely reassuring. Routine discharge counseling was given, and the patient was instructed to promptly call or followup  with their primary physician or even return to the ER if any worsening, changing or persistent symptoms.         [AR]   1849 Heart score calculation:    History slightly suspicious: 0  History moderately suspicious: +1  History highly suspicious: +2    EKG normal: 0  EKG non-specific repolarization disturbance: +1  EKG sig ST deviation: +2    Age <45: 0  Age 45-64: +1  Age >65: +2    No known risk factors: 0  1-2 risk factors: +1  3 or more risk factors: +2    Initial troponin less than the normal limit: 0  Initial troponin 1-3 times the normal limit: +1  Initial troponin greater than 3 times the normal limit: +2    Total score: 3 [AR]      ED Course User Index  [AR] Marga Albright MD             AS OF 22:40 EDT VITALS:    BP - 155/73  HR - 81  TEMP - 99 øF (37.2 øC)  O2 SATS - 98%        DIAGNOSIS  Final diagnoses:   Left flank pain   Chest pain, unspecified type         DISPOSITION  ED Disposition       ED Disposition   Discharge    Condition   Stable    Comment   --                  Note Disclaimer: At [facility], we believe that sharing information builds trust and better relationships. You are receiving this note because you recently visited [facility]. It is possible you will see health information before a provider has talked with you about it. This kind of information can be easy to misunderstand. To help you fully understand what it means for your health, we urge you to discuss this note with your provider.         Marga Albright MD  08/03/23 6440

## 2023-08-03 NOTE — DISCHARGE INSTRUCTIONS
Rest at home avoiding any particularly strenuous activity today.     Eat small, frequent meals and drink plenty of fluids. Take any medication prescribed as instructed. If given antibiotics, then finish the full course of them.    Monitor for any signs of recurrent symptoms or worsening and see your primary doctor to discuss your ER visit while returning to the ER if any concerns as we discussed.

## 2023-08-11 ENCOUNTER — CLINICAL SUPPORT (OUTPATIENT)
Dept: INTERNAL MEDICINE | Facility: CLINIC | Age: 73
End: 2023-08-11
Payer: MEDICARE

## 2023-08-11 ENCOUNTER — TELEPHONE (OUTPATIENT)
Dept: INTERNAL MEDICINE | Facility: CLINIC | Age: 73
End: 2023-08-11

## 2023-08-11 RX ADMIN — CYANOCOBALAMIN 1000 MCG: 1000 INJECTION, SOLUTION INTRAMUSCULAR; SUBCUTANEOUS at 09:56

## 2023-09-05 RX ORDER — AZATHIOPRINE 50 MG/1
TABLET ORAL
Qty: 90 TABLET | Refills: 1 | Status: SHIPPED | OUTPATIENT
Start: 2023-09-05 | End: 2023-09-11

## 2023-09-07 NOTE — TELEPHONE ENCOUNTER
"Caller: Esme Lemus \"PAT\"    Relationship: Self    Requested Prescriptions:   Requested Prescriptions     Pending Prescriptions Disp Refills    furosemide (LASIX) 20 MG tablet       Sig: Take 1 tablet by mouth Daily.        Pharmacy where request should be sent: [facility] [address] [phone] [facility] [phone] FX     Last office visit with prescribing clinician: 6/2/2023   Last telemedicine visit with prescribing clinician: Visit date not found   Next office visit with prescribing clinician: 12/8/2023     Additional details provided by patient: PATIENT WANTS A 90 DAY REFILL.       Does the patient have less than a 3 day supply:  [] Yes  [x] No    Would you like a call back once the refill request has been completed: [] Yes [x] No    If the office needs to give you a call back, can they leave a voicemail: [] Yes [x] No    Juanito Mcintyre   09/07/23 08:25 EDT       "

## 2023-09-08 RX ORDER — FUROSEMIDE 20 MG/1
20 TABLET ORAL DAILY
Qty: 90 TABLET | Refills: 0 | Status: SHIPPED | OUTPATIENT
Start: 2023-09-08

## 2023-09-11 ENCOUNTER — CLINICAL SUPPORT (OUTPATIENT)
Dept: INTERNAL MEDICINE | Facility: CLINIC | Age: 73
End: 2023-09-11
Payer: MEDICARE

## 2023-09-11 DIAGNOSIS — E53.8 COBALAMIN DEFICIENCY: Primary | ICD-10-CM

## 2023-09-11 PROCEDURE — 96372 THER/PROPH/DIAG INJ SC/IM: CPT | Performed by: PHYSICIAN ASSISTANT

## 2023-09-11 RX ORDER — AZATHIOPRINE 50 MG/1
TABLET ORAL
Qty: 90 TABLET | Refills: 0 | Status: SHIPPED | OUTPATIENT
Start: 2023-09-11 | End: 2023-09-13

## 2023-09-11 RX ADMIN — CYANOCOBALAMIN 1000 MCG: 1000 INJECTION, SOLUTION INTRAMUSCULAR; SUBCUTANEOUS at 09:06

## 2023-09-13 RX ORDER — AZATHIOPRINE 50 MG/1
TABLET ORAL
Qty: 90 TABLET | Refills: 0 | Status: SHIPPED | OUTPATIENT
Start: 2023-09-13 | End: 2023-09-18

## 2023-09-18 RX ORDER — AZATHIOPRINE 50 MG/1
TABLET ORAL
Qty: 90 TABLET | Refills: 1 | Status: SHIPPED | OUTPATIENT
Start: 2023-09-18

## 2023-10-12 ENCOUNTER — CLINICAL SUPPORT (OUTPATIENT)
Dept: INTERNAL MEDICINE | Facility: CLINIC | Age: 73
End: 2023-10-12
Payer: MEDICARE

## 2023-10-12 DIAGNOSIS — E53.8 COBALAMIN DEFICIENCY: Primary | ICD-10-CM

## 2023-10-12 RX ADMIN — CYANOCOBALAMIN 1000 MCG: 1000 INJECTION, SOLUTION INTRAMUSCULAR; SUBCUTANEOUS at 09:06

## 2023-10-24 ENCOUNTER — OFFICE VISIT (OUTPATIENT)
Dept: GASTROENTEROLOGY | Facility: CLINIC | Age: 73
End: 2023-10-24
Payer: MEDICARE

## 2023-10-24 VITALS
OXYGEN SATURATION: 98 % | DIASTOLIC BLOOD PRESSURE: 74 MMHG | SYSTOLIC BLOOD PRESSURE: 130 MMHG | TEMPERATURE: 97.8 F | HEIGHT: 66 IN | WEIGHT: 200.7 LBS | HEART RATE: 77 BPM | BODY MASS INDEX: 32.26 KG/M2

## 2023-10-24 DIAGNOSIS — K74.69 OTHER CIRRHOSIS OF LIVER: ICD-10-CM

## 2023-10-24 DIAGNOSIS — K75.4 AUTOIMMUNE HEPATITIS: Primary | ICD-10-CM

## 2023-10-24 DIAGNOSIS — Z86.010 HISTORY OF COLON POLYPS: ICD-10-CM

## 2023-10-24 DIAGNOSIS — K21.00 GASTROESOPHAGEAL REFLUX DISEASE WITH ESOPHAGITIS WITHOUT HEMORRHAGE: ICD-10-CM

## 2023-10-24 PROCEDURE — 3078F DIAST BP <80 MM HG: CPT | Performed by: NURSE PRACTITIONER

## 2023-10-24 PROCEDURE — 99214 OFFICE O/P EST MOD 30 MIN: CPT | Performed by: NURSE PRACTITIONER

## 2023-10-24 PROCEDURE — 1159F MED LIST DOCD IN RCRD: CPT | Performed by: NURSE PRACTITIONER

## 2023-10-24 PROCEDURE — 1160F RVW MEDS BY RX/DR IN RCRD: CPT | Performed by: NURSE PRACTITIONER

## 2023-10-24 PROCEDURE — 3075F SYST BP GE 130 - 139MM HG: CPT | Performed by: NURSE PRACTITIONER

## 2023-10-24 NOTE — PROGRESS NOTES
"Chief Complaint   Patient presents with    Hepatic Disease           History of Present Illness  Patient is a 73-year-old female who presents today for Follow-up.  She has a history of autoimmune hepatitis, cirrhosis, colon polyps, diverticulosis, and GERD with esophagitis.    Patient presents today for follow-up.  Reports symptomatically she has done well since last office visit.  She has had no issues with heartburn or reflux.  She denies any nausea or vomiting.  Continues on pantoprazole.  Denies any bowel complaints.  Has noted some abdominal swelling.    Denies any jaundice or confusion or memory disturbances.    She continues on azathioprine for autoimmune hepatitis.  She is due for lab work for monitoring.    Most recent EGD for surveillance for varices December 2022 with no varices.     Result Review :         Comprehensive Metabolic Panel (08/03/2023 14:30)    CBC & Differential (08/03/2023 14:30)    Office Visit with Oly Talavera APRN (04/24/2023)    Tissue Pathology Exam (12/29/2022 13:04)     COLONOSCOPY (12/29/2022 12:58)     UPPER GI ENDOSCOPY (12/29/2022 12:58)     Office Visit with Keila Bragg APRN (01/24/2023)   Vital Signs:   /74   Pulse 77   Temp 97.8 °F (36.6 °C)   Ht 167.6 cm (66\")   Wt 91 kg (200 lb 11.2 oz)   SpO2 98%   BMI 32.39 kg/m²     Body mass index is 32.39 kg/m².     Physical Exam  Vitals reviewed.   Constitutional:       General: She is not in acute distress.     Appearance: She is well-developed.   HENT:      Head: Normocephalic and atraumatic.   Pulmonary:      Effort: Pulmonary effort is normal. No respiratory distress.   Abdominal:      General: Abdomen is flat. Bowel sounds are normal. There is no distension.      Palpations: Abdomen is soft. There is no hepatomegaly.      Tenderness: There is no abdominal tenderness.   Skin:     General: Skin is dry.      Coloration: Skin is not pale.   Neurological:      Mental Status: She is alert and oriented to person, " place, and time.   Psychiatric:         Thought Content: Thought content normal.           Assessment and Plan    Diagnoses and all orders for this visit:    1. Autoimmune hepatitis (Primary)  -     CBC & Differential  -     Comprehensive Metabolic Panel  -     AFP Tumor Marker  -     Protime-INR  -     Ammonia  -     US Liver; Future    2. Other cirrhosis of liver  -     CBC & Differential  -     Comprehensive Metabolic Panel  -     AFP Tumor Marker  -     Protime-INR  -     Ammonia  -     US Liver; Future    3. Gastroesophageal reflux disease with esophagitis without hemorrhage    4. History of colon polyps         Discussion  Patient presents today for follow-up.  Symptoms are stable and controlled.  She is due for lab work and imaging for monitoring of cirrhosis and autoimmune hepatitis which we will obtain.  We will continue azathioprine at this time.  If her liver enzymes continue to be normal, will consider tapering off of azathioprine at next follow-up.  She is up-to-date on colon cancer screening and up-to-date on variceal surveillance.          Follow Up   Return in about 6 months (around 4/24/2024), or if symptoms worsen or fail to improve.    Patient Instructions   Check lab work today for monitoring of cirrhosis.    Schedule ultrasound for monitoring.    For autoimmune hepatitis, continue azathioprine as prescribed.

## 2023-10-24 NOTE — PATIENT INSTRUCTIONS
Check lab work today for monitoring of cirrhosis.    Schedule ultrasound for monitoring.    For autoimmune hepatitis, continue azathioprine as prescribed.

## 2023-10-25 DIAGNOSIS — E78.00 HYPERCHOLESTEROLEMIA: ICD-10-CM

## 2023-10-25 LAB
AFP-TM SERPL-MCNC: 2.1 NG/ML (ref 0–9.2)
ALBUMIN SERPL-MCNC: 4.3 G/DL (ref 3.5–5.2)
ALBUMIN/GLOB SERPL: 1.7 G/DL
ALP SERPL-CCNC: 92 U/L (ref 39–117)
ALT SERPL-CCNC: 26 U/L (ref 1–33)
AMMONIA PLAS-MCNC: 26 UMOL/L (ref 11–51)
AST SERPL-CCNC: 29 U/L (ref 1–32)
BASOPHILS # BLD AUTO: 0.01 10*3/MM3 (ref 0–0.2)
BASOPHILS NFR BLD AUTO: 0.2 % (ref 0–1.5)
BILIRUB SERPL-MCNC: 1.4 MG/DL (ref 0–1.2)
BUN SERPL-MCNC: 11 MG/DL (ref 8–23)
BUN/CREAT SERPL: 11.7 (ref 7–25)
CALCIUM SERPL-MCNC: 9.8 MG/DL (ref 8.6–10.5)
CHLORIDE SERPL-SCNC: 104 MMOL/L (ref 98–107)
CO2 SERPL-SCNC: 24.6 MMOL/L (ref 22–29)
CREAT SERPL-MCNC: 0.94 MG/DL (ref 0.57–1)
EGFRCR SERPLBLD CKD-EPI 2021: 63.8 ML/MIN/1.73
EOSINOPHIL # BLD AUTO: 0 10*3/MM3 (ref 0–0.4)
EOSINOPHIL NFR BLD AUTO: 0 % (ref 0.3–6.2)
ERYTHROCYTE [DISTWIDTH] IN BLOOD BY AUTOMATED COUNT: 12.7 % (ref 12.3–15.4)
GLOBULIN SER CALC-MCNC: 2.5 GM/DL
GLUCOSE SERPL-MCNC: 101 MG/DL (ref 65–99)
HCT VFR BLD AUTO: 42.9 % (ref 34–46.6)
HGB BLD-MCNC: 14.3 G/DL (ref 12–15.9)
IMM GRANULOCYTES # BLD AUTO: 0.01 10*3/MM3 (ref 0–0.05)
IMM GRANULOCYTES NFR BLD AUTO: 0.2 % (ref 0–0.5)
INR PPP: 1.02 (ref 0.9–1.1)
LYMPHOCYTES # BLD AUTO: 1.14 10*3/MM3 (ref 0.7–3.1)
LYMPHOCYTES NFR BLD AUTO: 26.8 % (ref 19.6–45.3)
MCH RBC QN AUTO: 30.7 PG (ref 26.6–33)
MCHC RBC AUTO-ENTMCNC: 33.3 G/DL (ref 31.5–35.7)
MCV RBC AUTO: 92.1 FL (ref 79–97)
MONOCYTES # BLD AUTO: 0.33 10*3/MM3 (ref 0.1–0.9)
MONOCYTES NFR BLD AUTO: 7.8 % (ref 5–12)
NEUTROPHILS # BLD AUTO: 2.76 10*3/MM3 (ref 1.7–7)
NEUTROPHILS NFR BLD AUTO: 65 % (ref 42.7–76)
NRBC BLD AUTO-RTO: 0 /100 WBC (ref 0–0.2)
PLATELET # BLD AUTO: 164 10*3/MM3 (ref 140–450)
POTASSIUM SERPL-SCNC: 4.1 MMOL/L (ref 3.5–5.2)
PROT SERPL-MCNC: 6.8 G/DL (ref 6–8.5)
PROTHROMBIN TIME: 13.5 SECONDS (ref 11.7–14.2)
RBC # BLD AUTO: 4.66 10*6/MM3 (ref 3.77–5.28)
SODIUM SERPL-SCNC: 141 MMOL/L (ref 136–145)
WBC # BLD AUTO: 4.25 10*3/MM3 (ref 3.4–10.8)

## 2023-10-25 RX ORDER — ATORVASTATIN CALCIUM 20 MG/1
20 TABLET, FILM COATED ORAL DAILY
Qty: 90 TABLET | Refills: 3 | Status: SHIPPED | OUTPATIENT
Start: 2023-10-25

## 2023-10-25 NOTE — TELEPHONE ENCOUNTER
"  Caller: Esme Lemus \"PAT\"    Relationship: Self    Best call back number: [phone]    Requested Prescriptions:   Requested Prescriptions     Pending Prescriptions Disp Refills    atorvastatin (LIPITOR) 20 MG tablet 90 tablet 3     Sig: Take 1 tablet by mouth Daily.        Pharmacy where request should be sent: [facility] [address] [phone] [facility] [phone]      Last office visit with prescribing clinician: 6/2/2023   Last telemedicine visit with prescribing clinician: Visit date not found   Next office visit with prescribing clinician: 12/8/2023     Does the patient have less than a 3 day supply:  [x] Yes  [] No    Would you like a call back once the refill request has been completed: [] Yes [x] No    If the office needs to give you a call back, can they leave a voicemail: [] Yes [x] No    Juanito Owen Rep   10/25/23 08:57 EDT           "

## 2023-11-06 ENCOUNTER — HOSPITAL ENCOUNTER (OUTPATIENT)
Facility: HOSPITAL | Age: 73
Discharge: HOME | End: 2023-11-06
Admitting: NURSE PRACTITIONER
Payer: MEDICARE

## 2023-11-06 DIAGNOSIS — K74.69 OTHER CIRRHOSIS OF LIVER: ICD-10-CM

## 2023-11-06 DIAGNOSIS — K74.69 OTHER CIRRHOSIS OF LIVER: Primary | ICD-10-CM

## 2023-11-06 DIAGNOSIS — K75.4 AUTOIMMUNE HEPATITIS: ICD-10-CM

## 2023-11-06 PROCEDURE — 76705 ECHO EXAM OF ABDOMEN: CPT

## 2023-11-09 ENCOUNTER — TELEPHONE (OUTPATIENT)
Dept: GASTROENTEROLOGY | Facility: CLINIC | Age: 73
End: 2023-11-09
Payer: MEDICARE

## 2023-11-13 ENCOUNTER — CLINICAL SUPPORT (OUTPATIENT)
Dept: INTERNAL MEDICINE | Facility: CLINIC | Age: 73
End: 2023-11-13
Payer: MEDICARE

## 2023-11-13 DIAGNOSIS — Z23 NEED FOR INFLUENZA VACCINATION: Primary | ICD-10-CM

## 2023-11-13 DIAGNOSIS — E53.8 B12 DEFICIENCY: ICD-10-CM

## 2023-11-13 RX ADMIN — CYANOCOBALAMIN 1000 MCG: 1000 INJECTION, SOLUTION INTRAMUSCULAR; SUBCUTANEOUS at 10:00

## 2023-11-27 RX ORDER — FUROSEMIDE 20 MG/1
20 TABLET ORAL DAILY
Qty: 90 TABLET | Refills: 3 | Status: SHIPPED | OUTPATIENT
Start: 2023-11-27

## 2023-11-28 DIAGNOSIS — E53.8 B12 DEFICIENCY: ICD-10-CM

## 2023-11-28 DIAGNOSIS — E55.9 VITAMIN D DEFICIENCY: ICD-10-CM

## 2023-11-28 DIAGNOSIS — E78.00 HYPERCHOLESTEROLEMIA: ICD-10-CM

## 2023-11-28 DIAGNOSIS — E11.9 TYPE 2 DIABETES MELLITUS WITHOUT COMPLICATION, WITHOUT LONG-TERM CURRENT USE OF INSULIN: ICD-10-CM

## 2023-11-28 DIAGNOSIS — K75.4 AUTOIMMUNE HEPATITIS: ICD-10-CM

## 2023-12-01 LAB
25(OH)D3+25(OH)D2 SERPL-MCNC: 41.4 NG/ML (ref 30–100)
ALBUMIN SERPL-MCNC: 4.6 G/DL (ref 3.5–5.2)
ALBUMIN/GLOB SERPL: 1.8 G/DL
ALP SERPL-CCNC: 100 U/L (ref 39–117)
ALT SERPL-CCNC: 34 U/L (ref 1–33)
AST SERPL-CCNC: 42 U/L (ref 1–32)
BILIRUB SERPL-MCNC: 1.3 MG/DL (ref 0–1.2)
BUN SERPL-MCNC: 12 MG/DL (ref 8–23)
BUN/CREAT SERPL: 14.6 (ref 7–25)
CALCIUM SERPL-MCNC: 10 MG/DL (ref 8.6–10.5)
CHLORIDE SERPL-SCNC: 105 MMOL/L (ref 98–107)
CHOLEST SERPL-MCNC: 190 MG/DL (ref 0–200)
CHOLEST/HDLC SERPL: 1.98 {RATIO}
CO2 SERPL-SCNC: 26.5 MMOL/L (ref 22–29)
CREAT SERPL-MCNC: 0.82 MG/DL (ref 0.57–1)
EGFRCR SERPLBLD CKD-EPI 2021: 75.2 ML/MIN/1.73
GLOBULIN SER CALC-MCNC: 2.6 GM/DL
GLUCOSE SERPL-MCNC: 102 MG/DL (ref 65–99)
HBA1C MFR BLD: 6.2 % (ref 4.8–5.6)
HDLC SERPL-MCNC: 96 MG/DL (ref 40–60)
LDLC SERPL CALC-MCNC: 83 MG/DL (ref 0–100)
POTASSIUM SERPL-SCNC: 4 MMOL/L (ref 3.5–5.2)
PROT SERPL-MCNC: 7.2 G/DL (ref 6–8.5)
SODIUM SERPL-SCNC: 142 MMOL/L (ref 136–145)
TRIGL SERPL-MCNC: 60 MG/DL (ref 0–150)
TSH SERPL DL<=0.005 MIU/L-ACNC: 1.24 UIU/ML (ref 0.27–4.2)
VIT B12 SERPL-MCNC: >2000 PG/ML (ref 211–946)
VLDLC SERPL CALC-MCNC: 11 MG/DL (ref 5–40)

## 2023-12-08 ENCOUNTER — OFFICE VISIT (OUTPATIENT)
Dept: INTERNAL MEDICINE | Facility: CLINIC | Age: 73
End: 2023-12-08
Payer: MEDICARE

## 2023-12-08 VITALS
TEMPERATURE: 98.3 F | SYSTOLIC BLOOD PRESSURE: 130 MMHG | WEIGHT: 200 LBS | DIASTOLIC BLOOD PRESSURE: 76 MMHG | BODY MASS INDEX: 32.28 KG/M2

## 2023-12-08 DIAGNOSIS — E78.00 HYPERCHOLESTEROLEMIA: ICD-10-CM

## 2023-12-08 DIAGNOSIS — Z12.31 SCREENING MAMMOGRAM, ENCOUNTER FOR: ICD-10-CM

## 2023-12-08 DIAGNOSIS — E11.9 TYPE 2 DIABETES MELLITUS WITHOUT COMPLICATION, WITHOUT LONG-TERM CURRENT USE OF INSULIN: Primary | ICD-10-CM

## 2023-12-08 DIAGNOSIS — M85.88 OTHER SPECIFIED DISORDERS OF BONE DENSITY AND STRUCTURE, OTHER SITE: ICD-10-CM

## 2023-12-08 DIAGNOSIS — K75.4 AUTOIMMUNE HEPATITIS: ICD-10-CM

## 2023-12-08 DIAGNOSIS — E53.8 B12 DEFICIENCY: ICD-10-CM

## 2023-12-08 DIAGNOSIS — I10 PRIMARY HYPERTENSION: ICD-10-CM

## 2023-12-08 DIAGNOSIS — Z00.00 MEDICARE ANNUAL WELLNESS VISIT, SUBSEQUENT: ICD-10-CM

## 2023-12-08 RX ADMIN — CYANOCOBALAMIN 1000 MCG: 1000 INJECTION, SOLUTION INTRAMUSCULAR; SUBCUTANEOUS at 09:24

## 2023-12-08 NOTE — PROGRESS NOTES
Subjective   Chief Complaint   Patient presents with    Hypertension    Diabetes       History of Present Illness   Pt is here today for fup on her dm, htn. She is overall doing well, has been having a little fatigue. Has KALEIGH has never used cpap consistently. She does sometimes wake up around 3-4 AM and lays in bed for a few hours and then get's up. Does sometimes take melatonin, never consistently.          Patient Active Problem List   Diagnosis    Gastroesophageal reflux disease    History of Nissen fundoplication    History of colon polyps    Allergic rhinitis    Anxiety disorder    AV david re-entry tachycardia    Cobalamin deficiency    Delayed gastric emptying    Diastolic dysfunction    Early satiety    Edema    Fatigue    Fatty liver    Hypercholesterolemia    Hypertensive disorder    Incisional hernia    Knee pain    Lesion of liver    Lumbosacral stenosis    Metabolic syndrome    Obesity with body mass index 30 or greater    Osteoarthritis of right knee    Osteopenia    Paroxysmal SVT (supraventricular tachycardia)    Sciatica of right side    Sleep apnea    Type 2 diabetes mellitus    Esophagitis    Gastric ulcer    Autoimmune hepatitis    Other cirrhosis of liver       Allergies   Allergen Reactions    Fish Oil Itching    Rocuronium Anaphylaxis    Shellfish-Derived Products Nausea And Vomiting and GI Intolerance       Current Outpatient Medications on File Prior to Visit   Medication Sig Dispense Refill    amLODIPine (NORVASC) 10 MG tablet Take 1 tablet by mouth Daily.      Aspirin Buf,CaCarb-MgCarb-MgO, 81 MG tablet Take 81 mg by mouth Daily.      atorvastatin (LIPITOR) 20 MG tablet Take 1 tablet by mouth Daily. 90 tablet 3    azaTHIOprine (IMURAN) 50 MG tablet TAKE 1 TABLET DAILY 90 tablet 1    Diclofenac Sodium (VOLTAREN) 1 % gel gel Apply 4 g topically to the appropriate area as directed 4 (Four) Times a Day As Needed (joint pain). 350 g 1    fexofenadine (ALLEGRA) 180 MG tablet Take 1 tablet by  mouth Daily.      fluticasone (FLONASE) 50 MCG/ACT nasal spray 2 sprays into the nostril(s) as directed by provider Daily.      furosemide (LASIX) 20 MG tablet TAKE 1 TABLET DAILY 90 tablet 3    lidocaine (LIDODERM) 5 % Place 1 patch on the skin as directed by provider Daily. Remove & Discard patch within 12 hours or as directed by MD 10 patch 0    losartan (COZAAR) 50 MG tablet Take 1 tablet by mouth Daily.      LUMIGAN 0.01 % ophthalmic drops Administer 1 drop to both eyes Every Night.      metoprolol succinate XL (TOPROL-XL) 50 MG 24 hr tablet TAKE 1 TABLET DAILY 90 tablet 3    Multiple Vitamin (MULTIVITAMIN) tablet Take 1 tablet by mouth Daily.      pantoprazole (PROTONIX) 40 MG EC tablet TAKE 1 TABLET DAILY ONE HOUR BEFORE FIRST MEAL OF THE DAY 90 tablet 3    VITAMIN B COMPLEX-C PO Take  by mouth.      vitamin D3 125 MCG (5000 UT) capsule capsule Take 1 capsule by mouth Daily.       Current Facility-Administered Medications on File Prior to Visit   Medication Dose Route Frequency Provider Last Rate Last Admin    cyanocobalamin injection 1,000 mcg  1,000 mcg Intramuscular Q28 Days Alice Lino PA-C   1,000 mcg at 12/08/23 0924       Past Medical History:   Diagnosis Date    Abnormal blood chemistry     Acid reflux disease     Acute sinusitis     Allergic rhinitis     Anxiety disorder     Arthritis     AV david re-entry tachycardia     BMI 34.0-34.9,adult     Cancer     hysterectomy partial     Cataract     Deficiency of vitamin B12     Elevated liver enzymes     Elevated liver function tests     Encounter for screening for osteoporosis     Fatigue     GERD (gastroesophageal reflux disease)     Hiatal hernia     High blood pressure     High cholesterol     History of sinusitis     Hyperlipidemia     Hypertension     Knee pain     Liver disease     Lower back pain     Lumbosacral spinal stenosis     Menopause     Metabolic syndrome     Musculoskeletal back pain     Osteoarthritis of both knees     Osteopenia of  multiple sites     Paroxysmal supraventricular tachycardia     Right flank discomfort     Sciatica of right side     Sleep apnea     doesn't wear- bothers her with reflux    Sleep disturbance     Stomach ulcer     Type 2 diabetes mellitus     Visit for screening mammogram     Weight loss counseling, encounter for        Family History   Problem Relation Age of Onset    Alzheimer's disease Mother     Kidney failure Father     Alzheimer's disease Brother     Lung cancer Brother     Stomach cancer Brother     Alcohol abuse Other     Hypertension Other     Arthritis Other     Colon cancer Neg Hx     Colon polyps Neg Hx     Crohn's disease Neg Hx     Irritable bowel syndrome Neg Hx     Ulcerative colitis Neg Hx        Social History     Socioeconomic History    Marital status:    Tobacco Use    Smoking status: Former    Smokeless tobacco: Never   Vaping Use    Vaping Use: Never used   Substance and Sexual Activity    Alcohol use: Yes     Comment: social    Drug use: Never    Sexual activity: Defer       Past Surgical History:   Procedure Laterality Date    COLONOSCOPY      COLONOSCOPY Left 03/08/2021    Procedure: COLONOSCOPY;  Surgeon: Javier Toth MD;  Location: [facility];  Service: Gastroenterology;  Laterality: Left;    COLONOSCOPY W/ POLYPECTOMY N/A 12/29/2022    Procedure: COLONOSCOPY WITH POLYPECTOMY;  Surgeon: Javier Toth MD;  Location: [facility];  Service: Gastroenterology;  Laterality: N/A;  HEMORRHOIDS, POLYP    ENDOSCOPY Left 03/08/2021    Procedure: ESOPHAGOGASTRODUODENOSCOPY;  Surgeon: Javier Toth MD;  Location: [facility];  Service: Gastroenterology;  Laterality: Left;    ENDOSCOPY N/A 12/29/2022    Procedure: ESOPHAGOGASTRODUODENOSCOPY WITH BIOPSY;  Surgeon: Javier Toth MD;  Location: [facility];  Service: Gastroenterology;  Laterality: N/A;  ESOPHAGITIS, GASTRIC POLYPS    HEMORROIDECTOMY      HERNIA REPAIR      HYSTERECTOMY      due to cancer     INGUINAL HERNIA REPAIR      KNEE SURGERY      SINUS SURGERY      TUBAL ABDOMINAL LIGATION      UPPER GASTROINTESTINAL ENDOSCOPY           The following portions of the patient's history were reviewed and updated as appropriate: problem list, allergies, current medications, past medical history, past family history, past social history, and past surgical history.    ROS    See HPI    Immunization History   Administered Date(s) Administered    COVID-19 (PFIZER) BIVALENT 12+YRS 11/15/2022    COVID-19 (PFIZER) Purple Cap Monovalent 02/23/2021, 03/16/2021, 10/25/2021    COVID-19 F23 (PFIZER) 12YRS+ (COMIRNATY) 10/13/2023    Fluad Quad 65+ 09/11/2020, 09/21/2021    Fluzone High Dose =>65 Years ([employer] ONLY) 12/11/2015, 12/02/2016, 12/04/2017, 10/09/2018, 10/23/2019    Fluzone High-Dose 65+yrs 11/22/2022, 11/13/2023    Hepatitis B 08/24/2022, 09/26/2022    INFLUENZA NASAL UF 01/01/2022    Pneumococcal Conjugate 13-Valent (PCV13) 12/11/2015    Pneumococcal Polysaccharide (PPSV23) 08/28/2017       Objective   Vitals:    12/08/23 0858   BP: 130/76   Temp: 98.3 °F (36.8 °C)   Weight: 90.7 kg (200 lb)     Body mass index is 32.28 kg/m².  Physical Exam  Vitals reviewed.   Constitutional:       Appearance: She is well-developed.   HENT:      Head: Normocephalic and atraumatic.   Cardiovascular:      Rate and Rhythm: Normal rate and regular rhythm.      Heart sounds: Normal heart sounds, S1 normal and S2 normal.   Pulmonary:      Effort: Pulmonary effort is normal.      Breath sounds: Normal breath sounds.   Skin:     General: Skin is warm.   Neurological:      Mental Status: She is alert.   Psychiatric:         Behavior: Behavior normal.           Assessment & Plan   Diagnoses and all orders for this visit:    1. Type 2 diabetes mellitus without complication, without long-term current use of insulin (Primary)  Comments:  controlled with diet.    2. Primary hypertension  Comments:  Controlled.    3. B12 deficiency    4. Medicare  annual wellness visit, subsequent    5. Screening mammogram, encounter for  -     Mammo Screening Digital Tomosynthesis Bilateral With CAD; Future    6. Other specified disorders of bone density and structure, other site  -     DEXA Bone Density Axial    7. Autoimmune hepatitis  Comments:  Stable    8. Hypercholesterolemia  Comments:  Lipids to goal         Return in about 6 months (around 6/8/2024) for Medicare Wellness, Lab Appt Before FUP.

## 2024-01-09 ENCOUNTER — CLINICAL SUPPORT (OUTPATIENT)
Dept: INTERNAL MEDICINE | Facility: CLINIC | Age: 74
End: 2024-01-09
Payer: MEDICARE

## 2024-01-09 DIAGNOSIS — E53.8 B12 DEFICIENCY: Primary | ICD-10-CM

## 2024-01-09 PROCEDURE — 96372 THER/PROPH/DIAG INJ SC/IM: CPT | Performed by: PHYSICIAN ASSISTANT

## 2024-01-09 RX ADMIN — CYANOCOBALAMIN 1000 MCG: 1000 INJECTION, SOLUTION INTRAMUSCULAR; SUBCUTANEOUS at 10:32

## 2024-01-29 RX ORDER — METOPROLOL SUCCINATE 50 MG/1
TABLET, EXTENDED RELEASE ORAL
Qty: 90 TABLET | Refills: 3 | Status: SHIPPED | OUTPATIENT
Start: 2024-01-29

## 2024-02-09 ENCOUNTER — CLINICAL SUPPORT (OUTPATIENT)
Dept: INTERNAL MEDICINE | Facility: CLINIC | Age: 74
End: 2024-02-09
Payer: MEDICARE

## 2024-02-09 DIAGNOSIS — E53.8 B12 DEFICIENCY: Primary | ICD-10-CM

## 2024-02-09 RX ADMIN — CYANOCOBALAMIN 1000 MCG: 1000 INJECTION, SOLUTION INTRAMUSCULAR; SUBCUTANEOUS at 09:55

## 2024-03-11 ENCOUNTER — CLINICAL SUPPORT (OUTPATIENT)
Dept: INTERNAL MEDICINE | Facility: CLINIC | Age: 74
End: 2024-03-11
Payer: MEDICARE

## 2024-03-11 DIAGNOSIS — E53.8 B12 DEFICIENCY: Primary | ICD-10-CM

## 2024-03-11 RX ADMIN — CYANOCOBALAMIN 1000 MCG: 1000 INJECTION, SOLUTION INTRAMUSCULAR; SUBCUTANEOUS at 09:39

## 2024-03-13 ENCOUNTER — TELEPHONE (OUTPATIENT)
Dept: INTERNAL MEDICINE | Facility: CLINIC | Age: 74
End: 2024-03-13
Payer: MEDICARE

## 2024-03-13 DIAGNOSIS — I10 PRIMARY HYPERTENSION: Primary | ICD-10-CM

## 2024-03-13 RX ORDER — AMLODIPINE BESYLATE 10 MG/1
10 TABLET ORAL DAILY
Qty: 90 TABLET | Refills: 0 | Status: SHIPPED | OUTPATIENT
Start: 2024-03-13

## 2024-03-13 NOTE — TELEPHONE ENCOUNTER
"    Caller: Esme Lemus \"PAT\"    Relationship: Self    Best call back number:     What medication are you requesting: AMLODIPINE    What are your current symptoms:     How long have you been experiencing symptoms:     Have you had these symptoms before:    [] Yes  [] No    Have you been treated for these symptoms before:   [] Yes  [] No    If a prescription is needed, what is your preferred pharmacy and phone number:      Additional notes: PATIENT IS ON THE PHONE WITH ME AND WITH HER [facility].  THEY SAID THEY RECEIVED A 30 DAY REQUEST FOR THE PATIENTS AMLODIPINE 10MG AND SHE SAYS THIS WILL NEED TO BE FOR A 90 DAY SUPPLY.  THIS MEDICATION IS ON THE PATIENTS MEDICATION LIST BUT WAS NOT PRESCRIBED BY DR QUINN.           "

## 2024-04-15 ENCOUNTER — CLINICAL SUPPORT (OUTPATIENT)
Dept: INTERNAL MEDICINE | Facility: CLINIC | Age: 74
End: 2024-04-15
Payer: MEDICARE

## 2024-04-15 DIAGNOSIS — E53.8 B12 DEFICIENCY: Primary | ICD-10-CM

## 2024-04-15 PROCEDURE — 96372 THER/PROPH/DIAG INJ SC/IM: CPT | Performed by: PHYSICIAN ASSISTANT

## 2024-04-15 RX ADMIN — CYANOCOBALAMIN 1000 MCG: 1000 INJECTION, SOLUTION INTRAMUSCULAR; SUBCUTANEOUS at 09:38

## 2024-04-23 ENCOUNTER — OFFICE VISIT (OUTPATIENT)
Dept: GASTROENTEROLOGY | Facility: CLINIC | Age: 74
End: 2024-04-23
Payer: MEDICARE

## 2024-04-23 ENCOUNTER — LAB (OUTPATIENT)
Dept: LAB | Facility: HOSPITAL | Age: 74
End: 2024-04-23
Payer: MEDICARE

## 2024-04-23 VITALS
HEIGHT: 66 IN | HEART RATE: 72 BPM | SYSTOLIC BLOOD PRESSURE: 124 MMHG | WEIGHT: 196.6 LBS | TEMPERATURE: 98 F | BODY MASS INDEX: 31.6 KG/M2 | DIASTOLIC BLOOD PRESSURE: 76 MMHG | OXYGEN SATURATION: 97 %

## 2024-04-23 DIAGNOSIS — K74.69 OTHER CIRRHOSIS OF LIVER: ICD-10-CM

## 2024-04-23 DIAGNOSIS — K21.00 GASTROESOPHAGEAL REFLUX DISEASE WITH ESOPHAGITIS WITHOUT HEMORRHAGE: ICD-10-CM

## 2024-04-23 DIAGNOSIS — K75.4 AUTOIMMUNE HEPATITIS: Primary | ICD-10-CM

## 2024-04-23 DIAGNOSIS — Z86.010 HISTORY OF COLON POLYPS: ICD-10-CM

## 2024-04-23 LAB
ALBUMIN SERPL-MCNC: 3.9 G/DL (ref 3.5–5.2)
ALBUMIN/GLOB SERPL: 1.4 G/DL
ALP SERPL-CCNC: 78 U/L (ref 39–117)
ALPHA-FETOPROTEIN: <2 NG/ML (ref 0–8.3)
ALT SERPL W P-5'-P-CCNC: 29 U/L (ref 1–33)
AMMONIA BLD-SCNC: 24 UMOL/L (ref 11–51)
ANION GAP SERPL CALCULATED.3IONS-SCNC: 8.3 MMOL/L (ref 5–15)
AST SERPL-CCNC: 31 U/L (ref 1–32)
BASOPHILS # BLD AUTO: 0.02 10*3/MM3 (ref 0–0.2)
BASOPHILS NFR BLD AUTO: 0.3 % (ref 0–1.5)
BILIRUB SERPL-MCNC: 1.5 MG/DL (ref 0–1.2)
BUN SERPL-MCNC: 15 MG/DL (ref 8–23)
BUN/CREAT SERPL: 15.6 (ref 7–25)
CALCIUM SPEC-SCNC: 10.2 MG/DL (ref 8.6–10.5)
CHLORIDE SERPL-SCNC: 104 MMOL/L (ref 98–107)
CO2 SERPL-SCNC: 26.7 MMOL/L (ref 22–29)
CREAT SERPL-MCNC: 0.96 MG/DL (ref 0.57–1)
DEPRECATED RDW RBC AUTO: 47.7 FL (ref 37–54)
EGFRCR SERPLBLD CKD-EPI 2021: 62.2 ML/MIN/1.73
EOSINOPHIL # BLD AUTO: 0 10*3/MM3 (ref 0–0.4)
EOSINOPHIL NFR BLD AUTO: 0 % (ref 0.3–6.2)
ERYTHROCYTE [DISTWIDTH] IN BLOOD BY AUTOMATED COUNT: 13.7 % (ref 12.3–15.4)
GLOBULIN UR ELPH-MCNC: 2.7 GM/DL
GLUCOSE SERPL-MCNC: 115 MG/DL (ref 65–99)
HCT VFR BLD AUTO: 42.6 % (ref 34–46.6)
HGB BLD-MCNC: 13.8 G/DL (ref 12–15.9)
IMM GRANULOCYTES # BLD AUTO: 0.03 10*3/MM3 (ref 0–0.05)
IMM GRANULOCYTES NFR BLD AUTO: 0.5 % (ref 0–0.5)
INR PPP: 1.1 (ref 0.9–1.1)
LYMPHOCYTES # BLD AUTO: 1.09 10*3/MM3 (ref 0.7–3.1)
LYMPHOCYTES NFR BLD AUTO: 16.7 % (ref 19.6–45.3)
MCH RBC QN AUTO: 30.7 PG (ref 26.6–33)
MCHC RBC AUTO-ENTMCNC: 32.4 G/DL (ref 31.5–35.7)
MCV RBC AUTO: 94.7 FL (ref 79–97)
MONOCYTES # BLD AUTO: 0.37 10*3/MM3 (ref 0.1–0.9)
MONOCYTES NFR BLD AUTO: 5.7 % (ref 5–12)
NEUTROPHILS NFR BLD AUTO: 5.03 10*3/MM3 (ref 1.7–7)
NEUTROPHILS NFR BLD AUTO: 76.8 % (ref 42.7–76)
NRBC BLD AUTO-RTO: 0 /100 WBC (ref 0–0.2)
PLATELET # BLD AUTO: 149 10*3/MM3 (ref 140–450)
PMV BLD AUTO: 9.7 FL (ref 6–12)
POTASSIUM SERPL-SCNC: 3.8 MMOL/L (ref 3.5–5.2)
PROT SERPL-MCNC: 6.6 G/DL (ref 6–8.5)
PROTHROMBIN TIME: 14.4 SECONDS (ref 11.7–14.2)
RBC # BLD AUTO: 4.5 10*6/MM3 (ref 3.77–5.28)
SODIUM SERPL-SCNC: 139 MMOL/L (ref 136–145)
WBC NRBC COR # BLD AUTO: 6.54 10*3/MM3 (ref 3.4–10.8)

## 2024-04-23 PROCEDURE — 1159F MED LIST DOCD IN RCRD: CPT | Performed by: NURSE PRACTITIONER

## 2024-04-23 PROCEDURE — 85610 PROTHROMBIN TIME: CPT | Performed by: NURSE PRACTITIONER

## 2024-04-23 PROCEDURE — 82105 ALPHA-FETOPROTEIN SERUM: CPT | Performed by: NURSE PRACTITIONER

## 2024-04-23 PROCEDURE — 3074F SYST BP LT 130 MM HG: CPT | Performed by: NURSE PRACTITIONER

## 2024-04-23 PROCEDURE — 99214 OFFICE O/P EST MOD 30 MIN: CPT | Performed by: NURSE PRACTITIONER

## 2024-04-23 PROCEDURE — 85025 COMPLETE CBC W/AUTO DIFF WBC: CPT | Performed by: NURSE PRACTITIONER

## 2024-04-23 PROCEDURE — 3078F DIAST BP <80 MM HG: CPT | Performed by: NURSE PRACTITIONER

## 2024-04-23 PROCEDURE — 82140 ASSAY OF AMMONIA: CPT | Performed by: NURSE PRACTITIONER

## 2024-04-23 PROCEDURE — 36415 COLL VENOUS BLD VENIPUNCTURE: CPT | Performed by: NURSE PRACTITIONER

## 2024-04-23 PROCEDURE — 1160F RVW MEDS BY RX/DR IN RCRD: CPT | Performed by: NURSE PRACTITIONER

## 2024-04-23 PROCEDURE — 80053 COMPREHEN METABOLIC PANEL: CPT | Performed by: NURSE PRACTITIONER

## 2024-04-23 RX ORDER — FAMOTIDINE 40 MG/1
40 TABLET, FILM COATED ORAL NIGHTLY
Qty: 90 TABLET | Refills: 3 | Status: SHIPPED | OUTPATIENT
Start: 2024-04-23

## 2024-04-23 RX ORDER — AZATHIOPRINE 50 MG/1
50 TABLET ORAL DAILY
Qty: 90 TABLET | Refills: 1 | Status: SHIPPED | OUTPATIENT
Start: 2024-04-23 | End: 2024-04-24

## 2024-04-23 RX ORDER — PANTOPRAZOLE SODIUM 40 MG/1
40 TABLET, DELAYED RELEASE ORAL DAILY
Qty: 90 TABLET | Refills: 3 | Status: SHIPPED | OUTPATIENT
Start: 2024-04-23

## 2024-04-23 NOTE — PROGRESS NOTES
"Chief Complaint   Patient presents with    Hepatic Disease         History of Present Illness  Patient is a 73-year-old female who presents today for Follow-up. She has a history of autoimmune hepatitis, cirrhosis, colon polyps, diverticulosis, and GERD with esophagitis.     Patient presents today for follow-up.  She continues on azathioprine 50 mg daily for autoimmune hepatitis.  She had an ultrasound most recently November 2023 with no evidence of hepatoma.  She is due for blood work for surveillance of cirrhosis.    She reports over the last few months she has had some worsening abdominal discomfort.  Pain is located to the epigastric area and described as a burning sensation.  It comes and goes.  She continues on pantoprazole.  She denies any nausea or vomiting.    Denies any bowel complaints.     Result Review :       US Liver (11/06/2023 08:58)    CBC & Differential (10/24/2023 10:32)    Comprehensive Metabolic Panel (12/01/2023 08:47)    Office Visit with Oly Talavera APRN (10/24/2023)    Tissue Pathology Exam (12/29/2022 13:04)     COLONOSCOPY (12/29/2022 12:58)     UPPER GI ENDOSCOPY (12/29/2022 12:58)     Vital Signs:   /76   Pulse 72   Temp 98 °F (36.7 °C)   Ht 167.6 cm (66\")   Wt 89.2 kg (196 lb 9.6 oz)   SpO2 97%   BMI 31.73 kg/m²     Body mass index is 31.73 kg/m².     Physical Exam  Vitals reviewed.   Constitutional:       General: She is not in acute distress.     Appearance: She is well-developed.   HENT:      Head: Normocephalic and atraumatic.   Pulmonary:      Effort: Pulmonary effort is normal. No respiratory distress.   Abdominal:      General: Abdomen is flat. Bowel sounds are normal. There is no distension.      Palpations: Abdomen is soft. There is no fluid wave or hepatomegaly.      Tenderness: There is no abdominal tenderness.   Skin:     General: Skin is dry.      Coloration: Skin is not pale.   Neurological:      Mental Status: She is alert and oriented to person, place, " and time.   Psychiatric:         Thought Content: Thought content normal.           Assessment and Plan    Diagnoses and all orders for this visit:    1. Autoimmune hepatitis (Primary)  -     CBC & Differential  -     Comprehensive Metabolic Panel  -     AFP Tumor Marker  -     Protime-INR  -     Ammonia    2. Other cirrhosis of liver  -     CBC & Differential  -     Comprehensive Metabolic Panel  -     AFP Tumor Marker  -     Protime-INR  -     Ammonia    3. Gastroesophageal reflux disease with esophagitis without hemorrhage    4. History of colon polyps    Other orders  -     famotidine (PEPCID) 40 MG tablet; Take 1 tablet by mouth Every Night.  Dispense: 90 tablet; Refill: 3  -     azaTHIOprine (IMURAN) 50 MG tablet; Take 1 tablet by mouth Daily.  Dispense: 90 tablet; Refill: 1  -     pantoprazole (PROTONIX) 40 MG EC tablet; Take 1 tablet by mouth Daily.  Dispense: 90 tablet; Refill: 3         Discussion  Patient presents today for follow-up with concerns about worsening epigastric pain.  Recommended continuing pantoprazole in the morning and we will add in famotidine in the evening.  Reinforced dietary modifications to help with reflux at today's office visit.  If symptoms do not improve, she was instructed to notify the office and we will consider EGD for further evaluation.    Regarding autoimmune hepatitis, will continue azathioprine and check lab work today for monitoring.    For cirrhosis, will check lab work today for recalculation of MELD score and to screen for HCC.  Ultrasound is scheduled for next month for surveillance.          Follow Up   Return in about 6 months (around 10/23/2024).    Patient Instructions   Check lab work today for monitoring of cirrhosis.    Proceed with ultrasound as scheduled May 2024 for monitoring of cirrhosis.    For autoimmune hepatitis, continue azathioprine 50 mg daily as prescribed.    For GERD with esophagitis, continue pantoprazole daily in the morning and start  famotidine daily in the evening.     For GERD, follow antireflux precautions.  Recommend avoiding eating within 3 to 4 hours of bedtime.  Avoid foods that can trigger symptoms which may include citrus fruits, spicy foods, tomatoes and red sauces, chocolate, coffee/tea, caffeinated or carbonated beverages, alcohol.     Call for any new or worsening symptoms or failure to improve.

## 2024-04-23 NOTE — PATIENT INSTRUCTIONS
Check lab work today for monitoring of cirrhosis.    Proceed with ultrasound as scheduled May 2024 for monitoring of cirrhosis.    For autoimmune hepatitis, continue azathioprine 50 mg daily as prescribed.    For GERD with esophagitis, continue pantoprazole daily in the morning and start famotidine daily in the evening.     For GERD, follow antireflux precautions.  Recommend avoiding eating within 3 to 4 hours of bedtime.  Avoid foods that can trigger symptoms which may include citrus fruits, spicy foods, tomatoes and red sauces, chocolate, coffee/tea, caffeinated or carbonated beverages, alcohol.     Call for any new or worsening symptoms or failure to improve.

## 2024-04-24 DIAGNOSIS — K75.4 AUTOIMMUNE HEPATITIS: Primary | ICD-10-CM

## 2024-04-24 RX ORDER — AZATHIOPRINE 50 MG/1
50 TABLET ORAL DAILY
Qty: 90 TABLET | Refills: 3 | Status: SHIPPED | OUTPATIENT
Start: 2024-04-24

## 2024-05-20 ENCOUNTER — CLINICAL SUPPORT (OUTPATIENT)
Dept: INTERNAL MEDICINE | Facility: CLINIC | Age: 74
End: 2024-05-20
Payer: MEDICARE

## 2024-05-20 DIAGNOSIS — K74.69 OTHER CIRRHOSIS OF LIVER: Primary | ICD-10-CM

## 2024-05-20 PROCEDURE — 96372 THER/PROPH/DIAG INJ SC/IM: CPT | Performed by: PHYSICIAN ASSISTANT

## 2024-05-20 RX ADMIN — CYANOCOBALAMIN 1000 MCG: 1000 INJECTION, SOLUTION INTRAMUSCULAR; SUBCUTANEOUS at 09:25

## 2024-05-29 ENCOUNTER — HOSPITAL ENCOUNTER (OUTPATIENT)
Dept: MAMMOGRAPHY | Facility: HOSPITAL | Age: 74
Discharge: HOME | End: 2024-05-29
Payer: MEDICARE

## 2024-05-29 ENCOUNTER — HOSPITAL ENCOUNTER (OUTPATIENT)
Dept: BONE DENSITY | Facility: HOSPITAL | Age: 74
Discharge: HOME | End: 2024-05-29
Payer: MEDICARE

## 2024-05-29 ENCOUNTER — HOSPITAL ENCOUNTER (OUTPATIENT)
Dept: ULTRASOUND IMAGING | Facility: HOSPITAL | Age: 74
Discharge: HOME | End: 2024-05-29
Payer: MEDICARE

## 2024-05-29 DIAGNOSIS — K74.69 OTHER CIRRHOSIS OF LIVER: ICD-10-CM

## 2024-05-29 DIAGNOSIS — I10 PRIMARY HYPERTENSION: ICD-10-CM

## 2024-05-29 DIAGNOSIS — E78.00 HYPERCHOLESTEROLEMIA: ICD-10-CM

## 2024-05-29 DIAGNOSIS — E78.00 HYPERCHOLESTEROLEMIA: Primary | ICD-10-CM

## 2024-05-29 DIAGNOSIS — E11.9 TYPE 2 DIABETES MELLITUS WITHOUT COMPLICATION, WITHOUT LONG-TERM CURRENT USE OF INSULIN: ICD-10-CM

## 2024-05-29 DIAGNOSIS — Z12.31 SCREENING MAMMOGRAM, ENCOUNTER FOR: ICD-10-CM

## 2024-05-29 PROCEDURE — 77063 BREAST TOMOSYNTHESIS BI: CPT

## 2024-05-29 PROCEDURE — 76705 ECHO EXAM OF ABDOMEN: CPT

## 2024-05-29 PROCEDURE — 77080 DXA BONE DENSITY AXIAL: CPT

## 2024-05-29 PROCEDURE — 77067 SCR MAMMO BI INCL CAD: CPT

## 2024-06-03 DIAGNOSIS — K74.69 OTHER CIRRHOSIS OF LIVER: Primary | ICD-10-CM

## 2024-06-07 LAB
ALBUMIN SERPL-MCNC: 4.5 G/DL (ref 3.5–5.2)
ALBUMIN/GLOB SERPL: 1.7 G/DL
ALP SERPL-CCNC: 88 U/L (ref 39–117)
ALT SERPL-CCNC: 24 U/L (ref 1–33)
AST SERPL-CCNC: 34 U/L (ref 1–32)
BASOPHILS # BLD AUTO: 0.02 10*3/MM3 (ref 0–0.2)
BASOPHILS NFR BLD AUTO: 0.4 % (ref 0–1.5)
BILIRUB SERPL-MCNC: 1.8 MG/DL (ref 0–1.2)
BUN SERPL-MCNC: 12 MG/DL (ref 8–23)
BUN/CREAT SERPL: 11.9 (ref 7–25)
CALCIUM SERPL-MCNC: 10.4 MG/DL (ref 8.6–10.5)
CHLORIDE SERPL-SCNC: 103 MMOL/L (ref 98–107)
CHOLEST SERPL-MCNC: 189 MG/DL (ref 0–200)
CO2 SERPL-SCNC: 26.3 MMOL/L (ref 22–29)
CREAT SERPL-MCNC: 1.01 MG/DL (ref 0.57–1)
EGFRCR SERPLBLD CKD-EPI 2021: 58.2 ML/MIN/1.73
EOSINOPHIL # BLD AUTO: 0 10*3/MM3 (ref 0–0.4)
EOSINOPHIL NFR BLD AUTO: 0 % (ref 0.3–6.2)
ERYTHROCYTE [DISTWIDTH] IN BLOOD BY AUTOMATED COUNT: 12.7 % (ref 12.3–15.4)
GLOBULIN SER CALC-MCNC: 2.7 GM/DL
GLUCOSE SERPL-MCNC: 103 MG/DL (ref 65–99)
HBA1C MFR BLD: 6.3 % (ref 4.8–5.6)
HCT VFR BLD AUTO: 46.1 % (ref 34–46.6)
HDLC SERPL-MCNC: 98 MG/DL (ref 40–60)
HGB BLD-MCNC: 14.9 G/DL (ref 12–15.9)
IMM GRANULOCYTES # BLD AUTO: 0.01 10*3/MM3 (ref 0–0.05)
IMM GRANULOCYTES NFR BLD AUTO: 0.2 % (ref 0–0.5)
LDLC SERPL CALC-MCNC: 79 MG/DL (ref 0–100)
LYMPHOCYTES # BLD AUTO: 0.95 10*3/MM3 (ref 0.7–3.1)
LYMPHOCYTES NFR BLD AUTO: 18.3 % (ref 19.6–45.3)
MCH RBC QN AUTO: 30.7 PG (ref 26.6–33)
MCHC RBC AUTO-ENTMCNC: 32.3 G/DL (ref 31.5–35.7)
MCV RBC AUTO: 94.9 FL (ref 79–97)
MONOCYTES # BLD AUTO: 0.29 10*3/MM3 (ref 0.1–0.9)
MONOCYTES NFR BLD AUTO: 5.6 % (ref 5–12)
NEUTROPHILS # BLD AUTO: 3.93 10*3/MM3 (ref 1.7–7)
NEUTROPHILS NFR BLD AUTO: 75.5 % (ref 42.7–76)
NRBC BLD AUTO-RTO: 0 /100 WBC (ref 0–0.2)
PLATELET # BLD AUTO: 154 10*3/MM3 (ref 140–450)
POTASSIUM SERPL-SCNC: 3.9 MMOL/L (ref 3.5–5.2)
PROT SERPL-MCNC: 7.2 G/DL (ref 6–8.5)
RBC # BLD AUTO: 4.86 10*6/MM3 (ref 3.77–5.28)
SODIUM SERPL-SCNC: 142 MMOL/L (ref 136–145)
TRIGL SERPL-MCNC: 64 MG/DL (ref 0–150)
VLDLC SERPL CALC-MCNC: 12 MG/DL (ref 5–40)
WBC # BLD AUTO: 5.2 10*3/MM3 (ref 3.4–10.8)

## 2024-06-11 LAB
BILIRUB DIRECT SERPL-MCNC: 0.3 MG/DL (ref 0–0.3)
BILIRUB INDIRECT SERPL-MCNC: 1.2 MG/DL
BILIRUB SERPL-MCNC: 1.5 MG/DL (ref 0–1.2)
VERBAL ADD-ON: NORMAL
WRITTEN AUTHORIZATION: NORMAL

## 2024-06-14 ENCOUNTER — OFFICE VISIT (OUTPATIENT)
Dept: INTERNAL MEDICINE | Facility: CLINIC | Age: 74
End: 2024-06-14
Payer: MEDICARE

## 2024-06-14 VITALS
DIASTOLIC BLOOD PRESSURE: 74 MMHG | WEIGHT: 196 LBS | SYSTOLIC BLOOD PRESSURE: 122 MMHG | HEIGHT: 66 IN | BODY MASS INDEX: 31.5 KG/M2 | TEMPERATURE: 98.6 F

## 2024-06-14 DIAGNOSIS — E78.00 HYPERCHOLESTEROLEMIA: ICD-10-CM

## 2024-06-14 DIAGNOSIS — E11.9 TYPE 2 DIABETES MELLITUS WITHOUT COMPLICATION, WITHOUT LONG-TERM CURRENT USE OF INSULIN: ICD-10-CM

## 2024-06-14 DIAGNOSIS — I10 PRIMARY HYPERTENSION: ICD-10-CM

## 2024-06-14 DIAGNOSIS — Z00.00 MEDICARE ANNUAL WELLNESS VISIT, SUBSEQUENT: ICD-10-CM

## 2024-06-14 DIAGNOSIS — E53.8 COBALAMIN DEFICIENCY: Primary | ICD-10-CM

## 2024-06-14 DIAGNOSIS — K75.4 AUTOIMMUNE HEPATITIS: ICD-10-CM

## 2024-06-14 RX ADMIN — CYANOCOBALAMIN 1000 MCG: 1000 INJECTION, SOLUTION INTRAMUSCULAR; SUBCUTANEOUS at 10:45

## 2024-06-14 NOTE — PROGRESS NOTES
The ABCs of the Annual Wellness Visit  Subsequent Medicare Wellness Visit    Subjective    Esme Lemus is a 74 y.o. female who presents for a Subsequent Medicare Wellness Visit.    The following portions of the patient's history were reviewed and   updated as appropriate: allergies, current medications, past family history, past medical history, past social history, past surgical history, and problem list.    Compared to one year ago, the patient feels her physical   health is the same.    Compared to one year ago, the patient feels her mental   health is the same.    Recent Hospitalizations:  She was not admitted to the hospital during the last year.       Current Medical Providers:  Patient Care Team:  Alice Lino PA-C as PCP - General (Physician Assistant)  Javier Toth MD as Consulting Physician (Gastroenterology)  Shaye Coates APRN as Nurse Practitioner (Gastroenterology)  Matheus Moreno MD as Consulting Physician (Pulmonary Disease)  Pattie Rangel MD as Consulting Physician (Orthopedic Surgery)  Oly Talavera APRN as Nurse Practitioner (Nurse Practitioner)  Keila Bragg APRN as Nurse Practitioner (Gastroenterology)    Outpatient Medications Prior to Visit   Medication Sig Dispense Refill    amLODIPine (NORVASC) 10 MG tablet Take 1 tablet by mouth Daily. 90 tablet 0    Aspirin Buf,CaCarb-MgCarb-MgO, 81 MG tablet Take 81 mg by mouth Daily.      atorvastatin (LIPITOR) 20 MG tablet Take 1 tablet by mouth Daily. 90 tablet 3    azaTHIOprine (IMURAN) 50 MG tablet TAKE 1 TABLET DAILY 90 tablet 3    Diclofenac Sodium (VOLTAREN) 1 % gel gel Apply 4 g topically to the appropriate area as directed 4 (Four) Times a Day As Needed (joint pain). 350 g 1    famotidine (PEPCID) 40 MG tablet Take 1 tablet by mouth Every Night. 90 tablet 3    fexofenadine (ALLEGRA) 180 MG tablet Take 1 tablet by mouth Daily.      fluticasone (FLONASE) 50 MCG/ACT nasal spray 2 sprays into the  nostril(s) as directed by provider Daily.      furosemide (LASIX) 20 MG tablet TAKE 1 TABLET DAILY 90 tablet 3    losartan (COZAAR) 50 MG tablet Take 1 tablet by mouth Daily.      LUMIGAN 0.01 % ophthalmic drops Administer 1 drop to both eyes Every Night.      metoprolol succinate XL (TOPROL-XL) 50 MG 24 hr tablet TAKE 1 TABLET DAILY 90 tablet 3    Multiple Vitamin (MULTIVITAMIN) tablet Take 1 tablet by mouth Daily.      pantoprazole (PROTONIX) 40 MG EC tablet Take 1 tablet by mouth Daily. 90 tablet 3    VITAMIN B COMPLEX-C PO Take  by mouth.      vitamin D3 125 MCG (5000 UT) capsule capsule Take 1 capsule by mouth Daily.      Cyanocobalamin 1000 MCG/ML kit Inject  as directed. (Patient not taking: Reported on 6/14/2024)       Facility-Administered Medications Prior to Visit   Medication Dose Route Frequency Provider Last Rate Last Admin    cyanocobalamin injection 1,000 mcg  1,000 mcg Intramuscular Q28 Days Alice Lino PA-C   1,000 mcg at 06/14/24 1045       No opioid medication identified on active medication list. I have reviewed chart for other potential  high risk medication/s and harmful drug interactions in the elderly.        Aspirin is on active medication list. Aspirin use is indicated based on review of current medical condition/s. Pros and cons of this therapy have been discussed today. Benefits of this medication outweigh potential harm.  Patient has been encouraged to continue taking this medication.  .      Patient Active Problem List   Diagnosis    Gastroesophageal reflux disease    History of Nissen fundoplication    History of colon polyps    Allergic rhinitis    Anxiety disorder    AV david re-entry tachycardia    Cobalamin deficiency    Delayed gastric emptying    Diastolic dysfunction    Early satiety    Edema    Fatigue    Fatty liver    Hypercholesterolemia    Hypertensive disorder    Incisional hernia    Knee pain    Lesion of liver    Lumbosacral stenosis    Metabolic syndrome    Obesity  "with body mass index 30 or greater    Osteoarthritis of right knee    Osteopenia    Paroxysmal SVT (supraventricular tachycardia)    Sciatica of right side    Sleep apnea    Type 2 diabetes mellitus    Esophagitis    Gastric ulcer    Autoimmune hepatitis    Other cirrhosis of liver     Advance Care Planning   Advance Care Planning     Advance Directive is not on file.  ACP discussion was held with the patient during this visit. Patient has an advance directive (not in EMR), copy requested.     Objective    Vitals:    24 1018   BP: 122/74   Temp: 98.6 °F (37 °C)   Weight: 88.9 kg (196 lb)   Height: 167.6 cm (65.98\")     Estimated body mass index is 31.65 kg/m² as calculated from the following:    Height as of this encounter: 167.6 cm (65.98\").    Weight as of this encounter: 88.9 kg (196 lb).    BMI is >= 30 and <35. (Class 1 Obesity). The following options were offered after discussion;: exercise counseling/recommendations and nutrition counseling/recommendations      Does the patient have evidence of cognitive impairment? No    Lab Results   Component Value Date    CHLPL 189 2024    TRIG 64 2024    HDL 98 (H) 2024    LDL 79 2024    VLDL 12 2024    HGBA1C 6.30 (H) 2024        HEALTH RISK ASSESSMENT    Smoking Status:  Social History     Tobacco Use   Smoking Status Former    Current packs/day: 0.00    Types: Cigarettes    Quit date: 8/15/1980    Years since quittin.8   Smokeless Tobacco Never     Alcohol Consumption:  Social History     Substance and Sexual Activity   Alcohol Use Yes    Comment: social     Fall Risk Screen:    STEADI Fall Risk Assessment was completed, and patient is at LOW risk for falls.Assessment completed on:2024    Depression Screenin/14/2024    10:23 AM   PHQ-2/PHQ-9 Depression Screening   Little Interest or Pleasure in Doing Things 0-->not at all   Feeling Down, Depressed or Hopeless 0-->not at all   PHQ-9: Brief Depression Severity " Measure Score 0       Health Habits and Functional and Cognitive Screenin/14/2024    10:22 AM   Functional & Cognitive Status   Do you have difficulty preparing food and eating? No   Do you have difficulty bathing yourself, getting dressed or grooming yourself? No   Do you have difficulty using the toilet? No   Do you have difficulty moving around from place to place? No   Do you have trouble with steps or getting out of a bed or a chair? No   Current Diet Well Balanced Diet   Dental Exam Up to date   Eye Exam Up to date   Exercise (times per week) 4 times per week   Current Exercises Include Walking   Do you need help using the phone?  No   Are you deaf or do you have serious difficulty hearing?  No   Do you need help to go to places out of walking distance? No   Do you need help shopping? No   Do you need help preparing meals?  No   Do you need help with housework?  No   Do you need help with laundry? No   Do you need help taking your medications? No   Do you need help managing money? No   Do you ever drive or ride in a car without wearing a seat belt? No   Have you felt unusual stress, anger or loneliness in the last month? No   Who do you live with? Spouse   If you need help, do you have trouble finding someone available to you? No   Have you been bothered in the last four weeks by sexual problems? No   Do you have difficulty concentrating, remembering or making decisions? No       Age-appropriate Screening Schedule:  Refer to the list below for future screening recommendations based on patient's age, sex and/or medical conditions. Orders for these recommended tests are listed in the plan section. The patient has been provided with a written plan.    Health Maintenance   Topic Date Due    TDAP/TD VACCINES (1 - Tdap) Never done    ZOSTER VACCINE (1 of 2) Never done    RSV Vaccine - Adults (1 - 1-dose 60+ series) Never done    Hepatitis B (3 of 3 - Risk 3-dose series) 2023    COVID-19 Vaccine (6 -  "2023-24 season) 12/08/2023    URINE MICROALBUMIN  05/30/2024    ANNUAL WELLNESS VISIT  06/02/2024    DIABETIC FOOT EXAM  06/02/2024    INFLUENZA VACCINE  08/01/2024    HEMOGLOBIN A1C  12/07/2024    DIABETIC EYE EXAM  03/28/2025    LIPID PANEL  06/07/2025    BMI FOLLOWUP  06/14/2025    COLORECTAL CANCER SCREENING  12/29/2025    DXA SCAN  05/29/2026    HEPATITIS C SCREENING  Completed    Pneumococcal Vaccine 65+  Completed                  CMS Preventative Services Quick Reference  Risk Factors Identified During Encounter  None Identified  The above risks/problems have been discussed with the patient.  Pertinent information has been shared with the patient in the After Visit Summary.  An After Visit Summary and PPPS were made available to the patient.    Follow Up:   Next Medicare Wellness visit to be scheduled in 1 year.       Additional E&M Note during same encounter follows:  Patient has multiple medical problems which are significant and separately identifiable that require additional work above and beyond the Medicare Wellness Visit.      Chief Complaint  Medicare Wellness-subsequent    Subjective        HPI  Esme Lemus is also being seen today for fup on her HTN, DM and HLD.          Objective   Vital Signs:  /74   Temp 98.6 °F (37 °C)   Ht 167.6 cm (65.98\")   Wt 88.9 kg (196 lb)   BMI 31.65 kg/m²     Physical Exam  Vitals reviewed.   Constitutional:       Appearance: She is well-developed.   HENT:      Head: Normocephalic and atraumatic.   Neck:      Vascular: No carotid bruit.   Cardiovascular:      Rate and Rhythm: Normal rate and regular rhythm.      Heart sounds: Normal heart sounds, S1 normal and S2 normal.   Pulmonary:      Effort: Pulmonary effort is normal.      Breath sounds: Normal breath sounds.   Skin:     General: Skin is warm.   Neurological:      Mental Status: She is alert.   Psychiatric:         Behavior: Behavior normal.                         Assessment and Plan "   Diagnoses and all orders for this visit:    1. Cobalamin deficiency (Primary)    2. Medicare annual wellness visit, subsequent    3. Autoimmune hepatitis  Comments:  Stable  Orders:  -     Comprehensive Metabolic Panel    4. Hypercholesterolemia  Comments:  LDL to goal  Orders:  -     Lipid Panel With / Chol / HDL Ratio    5. Primary hypertension  Comments:  controlled    6. Type 2 diabetes mellitus without complication, without long-term current use of insulin  Comments:  well controlled.  Orders:  -     Hemoglobin A1c  -     Microalbumin / Creatinine Urine Ratio - Urine, Clean Catch             Follow Up   Return in about 6 months (around 12/14/2024) for Lab Appt Before FUP.  Patient was given instructions and counseling regarding her condition or for health maintenance advice. Please see specific information pulled into the AVS if appropriate.

## 2024-06-17 DIAGNOSIS — I10 PRIMARY HYPERTENSION: ICD-10-CM

## 2024-06-17 RX ORDER — AMLODIPINE BESYLATE 10 MG/1
10 TABLET ORAL DAILY
Qty: 90 TABLET | Refills: 3 | Status: SHIPPED | OUTPATIENT
Start: 2024-06-17

## 2024-06-19 ENCOUNTER — TELEPHONE (OUTPATIENT)
Dept: INTERNAL MEDICINE | Facility: CLINIC | Age: 74
End: 2024-06-19
Payer: MEDICARE

## 2024-06-19 RX ORDER — CEFDINIR 300 MG/1
300 CAPSULE ORAL 2 TIMES DAILY
Qty: 14 CAPSULE | Refills: 0 | Status: SHIPPED | OUTPATIENT
Start: 2024-06-19 | End: 2024-06-26

## 2024-06-19 NOTE — TELEPHONE ENCOUNTER
"    Caller: Esme Lemus \"PAT\"    Relationship: Self    Best call back number: [phone]     Requested Prescriptions: fluticasone (FLONASE) 50 MCG/ACT nasal spray     ANTIBIOTIC      Requested Prescriptions      No prescriptions requested or ordered in this encounter        Pharmacy where request should be sent:           [facility]/pharmacy #[id number] - [place] [address] - [phone] [facility] [phone]  [phone]       Last office visit with prescribing clinician: 6/14/2024   Last telemedicine visit with prescribing clinician: Visit date not found   Next office visit with prescribing clinician: 12/9/2024     Additional details provided by patient: PATIENT IS CALLING TO STATE SHE IS HAVING A LOT OF DRAINAGE AND YELLOWISH MUCOUS.  SHE STATES SHE THINKS SHE HAS A SINUS INFECTION.  SHE IS WANTING TO KNOW IF SHE CAN GET A PRESCRIPTION FOR A SINUS INFECTION, ALONG WITH THE FLONASE.    Does the patient have less than a 3 day supply:  [x] Yes  [] No    Would you like a call back once the refill request has been completed: [] Yes [] No    If the office needs to give you a call back, can they leave a voicemail: [] Yes [] No    Juanito Alvarado Rep   06/19/24 08:43 EDT       PLEASE ADVISE.          "

## 2024-07-16 ENCOUNTER — CLINICAL SUPPORT (OUTPATIENT)
Dept: INTERNAL MEDICINE | Facility: CLINIC | Age: 74
End: 2024-07-16
Payer: MEDICARE

## 2024-07-16 DIAGNOSIS — E53.8 COBALAMIN DEFICIENCY: Primary | ICD-10-CM

## 2024-07-16 PROCEDURE — 96372 THER/PROPH/DIAG INJ SC/IM: CPT | Performed by: PHYSICIAN ASSISTANT

## 2024-07-16 RX ADMIN — CYANOCOBALAMIN 1000 MCG: 1000 INJECTION, SOLUTION INTRAMUSCULAR; SUBCUTANEOUS at 09:04

## 2024-08-16 ENCOUNTER — CLINICAL SUPPORT (OUTPATIENT)
Dept: INTERNAL MEDICINE | Facility: CLINIC | Age: 74
End: 2024-08-16
Payer: MEDICARE

## 2024-08-16 DIAGNOSIS — E53.8 VITAMIN B 12 DEFICIENCY: Primary | ICD-10-CM

## 2024-08-16 PROCEDURE — 96372 THER/PROPH/DIAG INJ SC/IM: CPT | Performed by: PHYSICIAN ASSISTANT

## 2024-08-16 RX ADMIN — CYANOCOBALAMIN 1000 MCG: 1000 INJECTION, SOLUTION INTRAMUSCULAR; SUBCUTANEOUS at 09:50

## 2024-09-16 ENCOUNTER — CLINICAL SUPPORT (OUTPATIENT)
Dept: INTERNAL MEDICINE | Facility: CLINIC | Age: 74
End: 2024-09-16
Payer: MEDICARE

## 2024-09-16 DIAGNOSIS — E53.8 COBALAMIN DEFICIENCY: Primary | ICD-10-CM

## 2024-09-16 PROCEDURE — 96372 THER/PROPH/DIAG INJ SC/IM: CPT | Performed by: PHYSICIAN ASSISTANT

## 2024-09-16 RX ADMIN — CYANOCOBALAMIN 1000 MCG: 1000 INJECTION, SOLUTION INTRAMUSCULAR; SUBCUTANEOUS at 09:27

## 2024-10-07 RX ORDER — AZATHIOPRINE 50 MG/1
50 TABLET ORAL DAILY
Qty: 90 TABLET | Refills: 3 | Status: SHIPPED | OUTPATIENT
Start: 2024-10-07

## 2024-10-15 ENCOUNTER — CLINICAL SUPPORT (OUTPATIENT)
Dept: INTERNAL MEDICINE | Facility: CLINIC | Age: 74
End: 2024-10-15
Payer: MEDICARE

## 2024-10-15 DIAGNOSIS — E53.8 COBALAMIN DEFICIENCY: Primary | ICD-10-CM

## 2024-10-15 PROCEDURE — 96372 THER/PROPH/DIAG INJ SC/IM: CPT | Performed by: PHYSICIAN ASSISTANT

## 2024-10-15 RX ADMIN — CYANOCOBALAMIN 1000 MCG: 1000 INJECTION, SOLUTION INTRAMUSCULAR; SUBCUTANEOUS at 09:40

## 2024-10-21 ENCOUNTER — OFFICE VISIT (OUTPATIENT)
Dept: GASTROENTEROLOGY | Facility: CLINIC | Age: 74
End: 2024-10-21
Payer: MEDICARE

## 2024-10-21 ENCOUNTER — LAB (OUTPATIENT)
Dept: LAB | Facility: HOSPITAL | Age: 74
End: 2024-10-21
Payer: MEDICARE

## 2024-10-21 VITALS
WEIGHT: 191.3 LBS | OXYGEN SATURATION: 97 % | BODY MASS INDEX: 30.74 KG/M2 | DIASTOLIC BLOOD PRESSURE: 84 MMHG | HEIGHT: 66 IN | TEMPERATURE: 96.8 F | HEART RATE: 78 BPM | SYSTOLIC BLOOD PRESSURE: 150 MMHG

## 2024-10-21 DIAGNOSIS — K75.4 AUTOIMMUNE HEPATITIS: Primary | ICD-10-CM

## 2024-10-21 DIAGNOSIS — R53.81 MALAISE AND FATIGUE: ICD-10-CM

## 2024-10-21 DIAGNOSIS — K21.00 GASTROESOPHAGEAL REFLUX DISEASE WITH ESOPHAGITIS WITHOUT HEMORRHAGE: ICD-10-CM

## 2024-10-21 DIAGNOSIS — R53.83 MALAISE AND FATIGUE: ICD-10-CM

## 2024-10-21 DIAGNOSIS — K74.69 OTHER CIRRHOSIS OF LIVER: ICD-10-CM

## 2024-10-21 DIAGNOSIS — Z86.0100 HISTORY OF COLON POLYPS: ICD-10-CM

## 2024-10-21 LAB
25(OH)D3 SERPL-MCNC: 32.6 NG/ML (ref 30–100)
ALBUMIN SERPL-MCNC: 4.2 G/DL (ref 3.5–5.2)
ALBUMIN/GLOB SERPL: 1.3 G/DL
ALP SERPL-CCNC: 79 U/L (ref 39–117)
ALPHA-FETOPROTEIN: <2 NG/ML (ref 0–8.3)
ALT SERPL W P-5'-P-CCNC: 24 U/L (ref 1–33)
AMMONIA BLD-SCNC: 22 UMOL/L (ref 11–51)
ANION GAP SERPL CALCULATED.3IONS-SCNC: 10 MMOL/L (ref 5–15)
AST SERPL-CCNC: 32 U/L (ref 1–32)
BASOPHILS # BLD AUTO: 0.01 10*3/MM3 (ref 0–0.2)
BASOPHILS NFR BLD AUTO: 0.2 % (ref 0–1.5)
BILIRUB SERPL-MCNC: 1.4 MG/DL (ref 0–1.2)
BUN SERPL-MCNC: 18 MG/DL (ref 8–23)
BUN/CREAT SERPL: 20.9 (ref 7–25)
CALCIUM SPEC-SCNC: 10.1 MG/DL (ref 8.6–10.5)
CHLORIDE SERPL-SCNC: 104 MMOL/L (ref 98–107)
CO2 SERPL-SCNC: 26 MMOL/L (ref 22–29)
CREAT SERPL-MCNC: 0.86 MG/DL (ref 0.57–1)
DEPRECATED RDW RBC AUTO: 48.2 FL (ref 37–54)
EGFRCR SERPLBLD CKD-EPI 2021: 70.6 ML/MIN/1.73
EOSINOPHIL # BLD AUTO: 0 10*3/MM3 (ref 0–0.4)
EOSINOPHIL NFR BLD AUTO: 0 % (ref 0.3–6.2)
ERYTHROCYTE [DISTWIDTH] IN BLOOD BY AUTOMATED COUNT: 14 % (ref 12.3–15.4)
GLOBULIN UR ELPH-MCNC: 3.3 GM/DL
GLUCOSE SERPL-MCNC: 111 MG/DL (ref 65–99)
HCT VFR BLD AUTO: 45.6 % (ref 34–46.6)
HGB BLD-MCNC: 14.8 G/DL (ref 12–15.9)
IMM GRANULOCYTES # BLD AUTO: 0 10*3/MM3 (ref 0–0.05)
IMM GRANULOCYTES NFR BLD AUTO: 0 % (ref 0–0.5)
INR PPP: 1.04 (ref 0.9–1.1)
LYMPHOCYTES # BLD AUTO: 1.21 10*3/MM3 (ref 0.7–3.1)
LYMPHOCYTES NFR BLD AUTO: 25.9 % (ref 19.6–45.3)
MCH RBC QN AUTO: 30.5 PG (ref 26.6–33)
MCHC RBC AUTO-ENTMCNC: 32.5 G/DL (ref 31.5–35.7)
MCV RBC AUTO: 94 FL (ref 79–97)
MONOCYTES # BLD AUTO: 0.22 10*3/MM3 (ref 0.1–0.9)
MONOCYTES NFR BLD AUTO: 4.7 % (ref 5–12)
NEUTROPHILS NFR BLD AUTO: 3.24 10*3/MM3 (ref 1.7–7)
NEUTROPHILS NFR BLD AUTO: 69.2 % (ref 42.7–76)
NRBC BLD AUTO-RTO: 0 /100 WBC (ref 0–0.2)
PLATELET # BLD AUTO: 154 10*3/MM3 (ref 140–450)
PMV BLD AUTO: 9.6 FL (ref 6–12)
POTASSIUM SERPL-SCNC: 3.9 MMOL/L (ref 3.5–5.2)
PROT SERPL-MCNC: 7.5 G/DL (ref 6–8.5)
PROTHROMBIN TIME: 13.8 SECONDS (ref 11.7–14.2)
RBC # BLD AUTO: 4.85 10*6/MM3 (ref 3.77–5.28)
SODIUM SERPL-SCNC: 140 MMOL/L (ref 136–145)
T-UPTAKE NFR SERPL: 1 TBI (ref 0.8–1.3)
T4 SERPL-MCNC: 5.55 MCG/DL (ref 4.5–11.7)
TSH SERPL DL<=0.05 MIU/L-ACNC: 1.22 UIU/ML (ref 0.27–4.2)
WBC NRBC COR # BLD AUTO: 4.68 10*3/MM3 (ref 3.4–10.8)

## 2024-10-21 PROCEDURE — 84443 ASSAY THYROID STIM HORMONE: CPT | Performed by: NURSE PRACTITIONER

## 2024-10-21 PROCEDURE — 1159F MED LIST DOCD IN RCRD: CPT | Performed by: NURSE PRACTITIONER

## 2024-10-21 PROCEDURE — 85025 COMPLETE CBC W/AUTO DIFF WBC: CPT | Performed by: NURSE PRACTITIONER

## 2024-10-21 PROCEDURE — 85610 PROTHROMBIN TIME: CPT | Performed by: NURSE PRACTITIONER

## 2024-10-21 PROCEDURE — 36415 COLL VENOUS BLD VENIPUNCTURE: CPT | Performed by: NURSE PRACTITIONER

## 2024-10-21 PROCEDURE — 84436 ASSAY OF TOTAL THYROXINE: CPT | Performed by: NURSE PRACTITIONER

## 2024-10-21 PROCEDURE — 3077F SYST BP >= 140 MM HG: CPT | Performed by: NURSE PRACTITIONER

## 2024-10-21 PROCEDURE — 99214 OFFICE O/P EST MOD 30 MIN: CPT | Performed by: NURSE PRACTITIONER

## 2024-10-21 PROCEDURE — 82105 ALPHA-FETOPROTEIN SERUM: CPT | Performed by: NURSE PRACTITIONER

## 2024-10-21 PROCEDURE — 82306 VITAMIN D 25 HYDROXY: CPT | Performed by: NURSE PRACTITIONER

## 2024-10-21 PROCEDURE — 82248 BILIRUBIN DIRECT: CPT | Performed by: NURSE PRACTITIONER

## 2024-10-21 PROCEDURE — 80053 COMPREHEN METABOLIC PANEL: CPT | Performed by: NURSE PRACTITIONER

## 2024-10-21 PROCEDURE — 1160F RVW MEDS BY RX/DR IN RCRD: CPT | Performed by: NURSE PRACTITIONER

## 2024-10-21 PROCEDURE — 3079F DIAST BP 80-89 MM HG: CPT | Performed by: NURSE PRACTITIONER

## 2024-10-21 PROCEDURE — 84479 ASSAY OF THYROID (T3 OR T4): CPT | Performed by: NURSE PRACTITIONER

## 2024-10-21 PROCEDURE — 82140 ASSAY OF AMMONIA: CPT | Performed by: NURSE PRACTITIONER

## 2024-10-21 RX ORDER — MELOXICAM 15 MG/1
15 TABLET ORAL DAILY
COMMUNITY
Start: 2024-09-27 | End: 2025-09-27

## 2024-10-21 NOTE — PATIENT INSTRUCTIONS
Check lab work today for monitoring of cirrhosis.    For monitoring of cirrhosis, ultrasound will be due to be completed November 2024.    For surveillance of colon polyps, colonoscopy recommended at 3-year interval, due December 2025.  We will plan on EGD at time of colonoscopy to reevaluate for varices.

## 2024-10-21 NOTE — PROGRESS NOTES
"Chief Complaint   Patient presents with    Hepatic Disease         History of Present Illness  Patient is a 74-year-old female who presents today for follow-up. She has a history of autoimmune hepatitis, cirrhosis, colon polyps, diverticulosis, and GERD with esophagitis. Most recent ultrasound May 2024 with no evidence of hepatoma.  She is due for lab work for monitoring and recalculation of MELD score.  Most recent EGD December 2022 with no varices.    Patient presents today for follow-up.  She reports over the last few weeks she has overall not been feeling well.  Reports malaise and fatigue.  She has felt sluggish and had a few episodes where she thought she may pass out.  She denies any respiratory symptoms or fever.  Denies any shortness of breath or chest pain.  Denies any weakness.  Recently received a B12 injection but it did not improve the way she was feeling like it typically does.    From a GI standpoint she has been feeling well.  She denies any abdominal pain, nausea, or vomiting.  Reports regular bowel movements.     Result Review :       UPPER GI ENDOSCOPY (12/29/2022 12:58)    US Liver (05/29/2024 08:49)    Office Visit with Oly Talavera APRN (04/23/2024)    Tissue Pathology Exam (12/29/2022 13:04)     COLONOSCOPY (12/29/2022 12:58)     Vital Signs:   /84   Pulse 78   Temp 96.8 °F (36 °C)   Ht 167.6 cm (66\")   Wt 86.8 kg (191 lb 4.8 oz)   SpO2 97%   BMI 30.88 kg/m²     Body mass index is 30.88 kg/m².     Physical Exam  Vitals reviewed.   Constitutional:       General: She is not in acute distress.     Appearance: She is well-developed.   HENT:      Head: Normocephalic and atraumatic.   Pulmonary:      Effort: Pulmonary effort is normal. No respiratory distress.   Abdominal:      General: Abdomen is flat. Bowel sounds are normal. There is no distension.      Palpations: Abdomen is soft.      Tenderness: There is no abdominal tenderness.   Skin:     General: Skin is dry.      " Coloration: Skin is not pale.   Neurological:      Mental Status: She is alert and oriented to person, place, and time.   Psychiatric:         Thought Content: Thought content normal.           Assessment and Plan    Diagnoses and all orders for this visit:    1. Autoimmune hepatitis (Primary)  -     CBC & Differential  -     Comprehensive Metabolic Panel  -     AFP Tumor Marker  -     Protime-INR  -     Ammonia    2. Other cirrhosis of liver  -     CBC & Differential  -     Comprehensive Metabolic Panel  -     AFP Tumor Marker  -     Protime-INR  -     Ammonia  -     Vitamin D,25-Hydroxy    3. Malaise and fatigue  -     Vitamin D,25-Hydroxy  -     Thyroid Panel With TSH    4. Gastroesophageal reflux disease with esophagitis without hemorrhage    5. History of colon polyps         Discussion  Patient presents today for follow-up of autoimmune hepatitis and cirrhosis.  We will check lab work today for monitoring.  Due to fatigue, we will also check thyroid panel and vitamin D level.  She will be due for ultrasound for surveillance in November 2024 which we will arrange to be completed.  Endoscopic evaluation up-to-date and will plan on next EGD for surveillance of varices to be done with her colonoscopy in 2025.    Regarding malaise and fatigue, discussed that fatigue can be associated with autoimmune hepatitis and cirrhosis.  Encouraged drinking plenty of water and following a high-protein diet.  If lab work is unremarkable and symptoms persist would also recommend follow-up with primary care provider.          Follow Up   Return in about 6 months (around 4/21/2025).    Patient Instructions   Check lab work today for monitoring of cirrhosis.    For monitoring of cirrhosis, ultrasound will be due to be completed November 2024.    For surveillance of colon polyps, colonoscopy recommended at 3-year interval, due December 2025.  We will plan on EGD at time of colonoscopy to reevaluate for varices.

## 2024-10-23 DIAGNOSIS — E78.00 HYPERCHOLESTEROLEMIA: ICD-10-CM

## 2024-10-23 RX ORDER — ATORVASTATIN CALCIUM 20 MG/1
20 TABLET, FILM COATED ORAL DAILY
Qty: 90 TABLET | Refills: 3 | Status: SHIPPED | OUTPATIENT
Start: 2024-10-23

## 2024-11-18 ENCOUNTER — CLINICAL SUPPORT (OUTPATIENT)
Dept: INTERNAL MEDICINE | Facility: CLINIC | Age: 74
End: 2024-11-18
Payer: MEDICARE

## 2024-11-18 DIAGNOSIS — E53.8 B12 DEFICIENCY: Primary | ICD-10-CM

## 2024-11-18 PROCEDURE — 96372 THER/PROPH/DIAG INJ SC/IM: CPT | Performed by: PHYSICIAN ASSISTANT

## 2024-11-18 RX ADMIN — CYANOCOBALAMIN 1000 MCG: 1000 INJECTION, SOLUTION INTRAMUSCULAR; SUBCUTANEOUS at 11:52

## 2024-11-25 RX ORDER — FUROSEMIDE 20 MG/1
20 TABLET ORAL DAILY
Qty: 90 TABLET | Refills: 3 | Status: SHIPPED | OUTPATIENT
Start: 2024-11-25

## 2024-12-04 LAB
ALBUMIN SERPL-MCNC: 4.3 G/DL (ref 3.8–4.8)
ALBUMIN/CREAT UR: 7 MG/G CREAT (ref 0–29)
ALP SERPL-CCNC: 80 IU/L (ref 44–121)
ALT SERPL-CCNC: 22 IU/L (ref 0–32)
AST SERPL-CCNC: 32 IU/L (ref 0–40)
BILIRUB SERPL-MCNC: 1.1 MG/DL (ref 0–1.2)
BUN SERPL-MCNC: 23 MG/DL (ref 8–27)
BUN/CREAT SERPL: 27 (ref 12–28)
CALCIUM SERPL-MCNC: 10.2 MG/DL (ref 8.7–10.3)
CHLORIDE SERPL-SCNC: 104 MMOL/L (ref 96–106)
CHOLEST SERPL-MCNC: 180 MG/DL (ref 100–199)
CHOLEST/HDLC SERPL: 2 RATIO (ref 0–4.4)
CO2 SERPL-SCNC: 23 MMOL/L (ref 20–29)
CREAT SERPL-MCNC: 0.85 MG/DL (ref 0.57–1)
CREAT UR-MCNC: 70.1 MG/DL
EGFRCR SERPLBLD CKD-EPI 2021: 71 ML/MIN/1.73
GLOBULIN SER CALC-MCNC: 2.7 G/DL (ref 1.5–4.5)
GLUCOSE SERPL-MCNC: 98 MG/DL (ref 70–99)
HBA1C MFR BLD: 6.4 % (ref 4.8–5.6)
HDLC SERPL-MCNC: 91 MG/DL
LDLC SERPL CALC-MCNC: 78 MG/DL (ref 0–99)
MICROALBUMIN UR-MCNC: 4.9 UG/ML
POTASSIUM SERPL-SCNC: 3.5 MMOL/L (ref 3.5–5.2)
PROT SERPL-MCNC: 7 G/DL (ref 6–8.5)
SODIUM SERPL-SCNC: 142 MMOL/L (ref 134–144)
TRIGL SERPL-MCNC: 56 MG/DL (ref 0–149)
VLDLC SERPL CALC-MCNC: 11 MG/DL (ref 5–40)

## 2024-12-09 ENCOUNTER — OFFICE VISIT (OUTPATIENT)
Dept: INTERNAL MEDICINE | Facility: CLINIC | Age: 74
End: 2024-12-09
Payer: MEDICARE

## 2024-12-09 VITALS
DIASTOLIC BLOOD PRESSURE: 74 MMHG | TEMPERATURE: 98.1 F | WEIGHT: 191 LBS | SYSTOLIC BLOOD PRESSURE: 130 MMHG | BODY MASS INDEX: 30.7 KG/M2 | HEIGHT: 66 IN

## 2024-12-09 DIAGNOSIS — I10 PRIMARY HYPERTENSION: ICD-10-CM

## 2024-12-09 DIAGNOSIS — E78.00 HYPERCHOLESTEROLEMIA: ICD-10-CM

## 2024-12-09 DIAGNOSIS — E11.9 TYPE 2 DIABETES MELLITUS WITHOUT COMPLICATION, WITHOUT LONG-TERM CURRENT USE OF INSULIN: Primary | ICD-10-CM

## 2024-12-09 DIAGNOSIS — G47.30 SLEEP APNEA, UNSPECIFIED TYPE: ICD-10-CM

## 2024-12-09 PROCEDURE — 1160F RVW MEDS BY RX/DR IN RCRD: CPT | Performed by: PHYSICIAN ASSISTANT

## 2024-12-09 PROCEDURE — 1159F MED LIST DOCD IN RCRD: CPT | Performed by: PHYSICIAN ASSISTANT

## 2024-12-09 PROCEDURE — G2211 COMPLEX E/M VISIT ADD ON: HCPCS | Performed by: PHYSICIAN ASSISTANT

## 2024-12-09 PROCEDURE — 3044F HG A1C LEVEL LT 7.0%: CPT | Performed by: PHYSICIAN ASSISTANT

## 2024-12-09 PROCEDURE — 3078F DIAST BP <80 MM HG: CPT | Performed by: PHYSICIAN ASSISTANT

## 2024-12-09 PROCEDURE — 3075F SYST BP GE 130 - 139MM HG: CPT | Performed by: PHYSICIAN ASSISTANT

## 2024-12-09 PROCEDURE — 99214 OFFICE O/P EST MOD 30 MIN: CPT | Performed by: PHYSICIAN ASSISTANT

## 2024-12-09 RX ORDER — TRAZODONE HYDROCHLORIDE 50 MG/1
50 TABLET, FILM COATED ORAL NIGHTLY
Qty: 30 TABLET | Refills: 0 | Status: SHIPPED | OUTPATIENT
Start: 2024-12-09

## 2024-12-09 NOTE — PROGRESS NOTES
Subjective   Chief Complaint   Patient presents with    Diabetes       History of Present Illness     Pt is here today for fup. Has been more fatigued. Sleep is not great, up a lot at night to urinate. Has a lot of fragmented sleep. Having joint pain as well.      Patient Active Problem List   Diagnosis    Gastroesophageal reflux disease    History of Nissen fundoplication    History of colon polyps    Allergic rhinitis    Anxiety disorder    AV david re-entry tachycardia    Cobalamin deficiency    Delayed gastric emptying    Diastolic dysfunction    Early satiety    Edema    Fatigue    Fatty liver    Hypercholesterolemia    Hypertensive disorder    Incisional hernia    Knee pain    Lesion of liver    Lumbosacral stenosis    Metabolic syndrome    Obesity with body mass index 30 or greater    Osteoarthritis of right knee    Osteopenia    Paroxysmal SVT (supraventricular tachycardia)    Sciatica of right side    Sleep apnea    Type 2 diabetes mellitus    Esophagitis    Gastric ulcer    Autoimmune hepatitis    Other cirrhosis of liver       Allergies   Allergen Reactions    Fish Oil Itching    Rocuronium Anaphylaxis    Shellfish-Derived Products Nausea And Vomiting and GI Intolerance       Current Outpatient Medications on File Prior to Visit   Medication Sig Dispense Refill    amLODIPine (NORVASC) 10 MG tablet TAKE 1 TABLET DAILY 90 tablet 3    Aspirin Buf,CaCarb-MgCarb-MgO, 81 MG tablet Take 81 mg by mouth Daily.      atorvastatin (LIPITOR) 20 MG tablet TAKE 1 TABLET DAILY 90 tablet 3    azaTHIOprine (IMURAN) 50 MG tablet TAKE 1 TABLET DAILY 90 tablet 3    Diclofenac Sodium (VOLTAREN) 1 % gel gel Apply 4 g topically to the appropriate area as directed 4 (Four) Times a Day As Needed (joint pain). 350 g 1    famotidine (PEPCID) 40 MG tablet Take 1 tablet by mouth Every Night. 90 tablet 3    fexofenadine (ALLEGRA) 180 MG tablet Take 1 tablet by mouth Daily.      fluticasone (FLONASE) 50 MCG/ACT nasal spray Administer 2  sprays into the nostril(s) as directed by provider Daily.      furosemide (LASIX) 20 MG tablet TAKE 1 TABLET DAILY 90 tablet 3    losartan (COZAAR) 50 MG tablet Take 1 tablet by mouth Daily.      LUMIGAN 0.01 % ophthalmic drops Administer 1 drop to both eyes Every Night.      meloxicam (MOBIC) 15 MG tablet Take 1 tablet by mouth Daily.      metoprolol succinate XL (TOPROL-XL) 50 MG 24 hr tablet TAKE 1 TABLET DAILY 90 tablet 3    Multiple Vitamin (MULTIVITAMIN) tablet Take 1 tablet by mouth Daily.      pantoprazole (PROTONIX) 40 MG EC tablet Take 1 tablet by mouth Daily. 90 tablet 3    VITAMIN B COMPLEX-C PO Take  by mouth.      vitamin D3 125 MCG (5000 UT) capsule capsule Take 1 capsule by mouth Daily.       Current Facility-Administered Medications on File Prior to Visit   Medication Dose Route Frequency Provider Last Rate Last Admin    cyanocobalamin injection 1,000 mcg  1,000 mcg Intramuscular Q28 Days Alice Lino PA-C   1,000 mcg at 11/18/24 1152       Past Medical History:   Diagnosis Date    Abnormal blood chemistry     Acid reflux disease     Acute sinusitis     Allergic rhinitis     Anxiety disorder     Arthritis     AV david re-entry tachycardia     BMI 34.0-34.9,adult     Cancer     hysterectomy partial     Cataract     Deficiency of vitamin B12     Elevated liver enzymes     Elevated liver function tests     Encounter for screening for osteoporosis     Fatigue     GERD (gastroesophageal reflux disease)     Hiatal hernia     High blood pressure     High cholesterol     History of sinusitis     Hyperlipidemia     Hypertension     Knee pain     Liver disease     Lower back pain     Lumbosacral spinal stenosis     Menopause     Metabolic syndrome     Musculoskeletal back pain     Osteoarthritis of both knees     Osteopenia of multiple sites     Paroxysmal supraventricular tachycardia     Right flank discomfort     Sciatica of right side     Sleep apnea     doesn't wear- bothers her with reflux    Sleep  disturbance     Stomach ulcer     Type 2 diabetes mellitus     Visit for screening mammogram     Weight loss counseling, encounter for        Family History   Problem Relation Age of Onset    Alzheimer's disease Mother     Kidney failure Father     Alzheimer's disease Brother     Lung cancer Brother     Stomach cancer Brother     Alcohol abuse Other     Hypertension Other     Arthritis Other     Colon cancer Neg Hx     Colon polyps Neg Hx     Crohn's disease Neg Hx     Irritable bowel syndrome Neg Hx     Ulcerative colitis Neg Hx        Social History     Socioeconomic History    Marital status:    Tobacco Use    Smoking status: Former     Current packs/day: 0.00     Types: Cigarettes     Quit date: 8/15/1980     Years since quittin.3    Smokeless tobacco: Never   Vaping Use    Vaping status: Never Used   Substance and Sexual Activity    Alcohol use: Yes     Comment: social    Drug use: Never    Sexual activity: Defer       Past Surgical History:   Procedure Laterality Date    COLONOSCOPY      COLONOSCOPY Left 2021    Procedure: COLONOSCOPY;  Surgeon: Javier Toth MD;  Location: [facility];  Service: Gastroenterology;  Laterality: Left;    COLONOSCOPY W/ POLYPECTOMY N/A 2022    Procedure: COLONOSCOPY WITH POLYPECTOMY;  Surgeon: Javier Toth MD;  Location: [facility];  Service: Gastroenterology;  Laterality: N/A;  HEMORRHOIDS, POLYP    ENDOSCOPY Left 2021    Procedure: ESOPHAGOGASTRODUODENOSCOPY;  Surgeon: Javier Toth MD;  Location: [facility];  Service: Gastroenterology;  Laterality: Left;    ENDOSCOPY N/A 2022    Procedure: ESOPHAGOGASTRODUODENOSCOPY WITH BIOPSY;  Surgeon: Javier Toth MD;  Location: [facility];  Service: Gastroenterology;  Laterality: N/A;  ESOPHAGITIS, GASTRIC POLYPS    HEMORROIDECTOMY      HERNIA REPAIR      HYSTERECTOMY      due to cancer    INGUINAL HERNIA REPAIR      KNEE SURGERY      SINUS SURGERY      TUBAL  "ABDOMINAL LIGATION      UPPER GASTROINTESTINAL ENDOSCOPY           The following portions of the patient's history were reviewed and updated as appropriate: problem list, allergies, current medications, past medical history, past family history, past social history, and past surgical history.    ROS    See HPI    Immunization History   Administered Date(s) Administered    COVID-19 (PFIZER) 12YRS+ (COMIRNATY) 10/13/2023, 09/11/2024    COVID-19 (PFIZER) BIVALENT 12+YRS 11/15/2022    COVID-19 (PFIZER) Purple Cap Monovalent 02/23/2021, 03/16/2021, 10/25/2021    Fluad Quad 65+ 09/11/2020, 09/21/2021    Fluzone High-Dose 65+YRS 12/11/2015, 12/02/2016, 12/04/2017, 10/09/2018, 10/23/2019, 09/11/2024    Fluzone High-Dose 65+yrs 11/22/2022, 11/13/2023, 09/11/2024    Hepatitis B 08/24/2022, 09/26/2022    INFLUENZA NASAL UF 01/01/2022    Pneumococcal Conjugate 13-Valent (PCV13) 12/11/2015    Pneumococcal Polysaccharide (PPSV23) 08/28/2017       Objective   Vitals:    12/09/24 1107   BP: 130/74   Temp: 98.1 °F (36.7 °C)   Weight: 86.6 kg (191 lb)   Height: 167.6 cm (65.98\")     Body mass index is 30.84 kg/m².  Physical Exam  Vitals reviewed.   Constitutional:       Appearance: She is well-developed.   HENT:      Head: Normocephalic and atraumatic.   Cardiovascular:      Rate and Rhythm: Normal rate and regular rhythm.      Heart sounds: Normal heart sounds, S1 normal and S2 normal.   Pulmonary:      Effort: Pulmonary effort is normal.      Breath sounds: Normal breath sounds.   Skin:     General: Skin is warm.   Neurological:      Mental Status: She is alert.   Psychiatric:         Behavior: Behavior normal.           Assessment & Plan   Diagnoses and all orders for this visit:    1. Type 2 diabetes mellitus without complication, without long-term current use of insulin (Primary)    2. Hypercholesterolemia    3. Primary hypertension    4. Sleep apnea, unspecified type    Other orders  -     traZODone (DESYREL) 50 MG tablet; Take " 1 tablet by mouth Every Night.  Dispense: 30 tablet; Refill: 0     Reviewed labs all great. HTN controlled. Will try Trazodone for sleep. If helpful [facility]    Return in about 6 months (around 6/9/2025) for Medicare Wellness, Lab Appt Before FUP.

## 2024-12-23 ENCOUNTER — CLINICAL SUPPORT (OUTPATIENT)
Dept: INTERNAL MEDICINE | Facility: CLINIC | Age: 74
End: 2024-12-23
Payer: MEDICARE

## 2024-12-23 DIAGNOSIS — E53.8 B12 DEFICIENCY: Primary | ICD-10-CM

## 2024-12-23 PROCEDURE — 96372 THER/PROPH/DIAG INJ SC/IM: CPT | Performed by: PHYSICIAN ASSISTANT

## 2024-12-23 RX ADMIN — CYANOCOBALAMIN 1000 MCG: 1000 INJECTION, SOLUTION INTRAMUSCULAR; SUBCUTANEOUS at 09:37

## 2025-01-03 RX ORDER — TRAZODONE HYDROCHLORIDE 50 MG/1
50 TABLET, FILM COATED ORAL NIGHTLY
Qty: 90 TABLET | Refills: 0 | Status: SHIPPED | OUTPATIENT
Start: 2025-01-03

## 2025-01-08 ENCOUNTER — HOSPITAL ENCOUNTER (OUTPATIENT)
Dept: ULTRASOUND IMAGING | Facility: HOSPITAL | Age: 75
Discharge: HOME | End: 2025-01-08
Admitting: NURSE PRACTITIONER
Payer: MEDICARE

## 2025-01-08 DIAGNOSIS — K74.69 OTHER CIRRHOSIS OF LIVER: ICD-10-CM

## 2025-01-08 DIAGNOSIS — K82.4 GALLBLADDER POLYP: Primary | ICD-10-CM

## 2025-01-08 PROCEDURE — 76705 ECHO EXAM OF ABDOMEN: CPT

## 2025-01-21 ENCOUNTER — OFFICE VISIT (OUTPATIENT)
Dept: SURGERY | Facility: CLINIC | Age: 75
End: 2025-01-21
Payer: MEDICARE

## 2025-01-21 VITALS
WEIGHT: 192 LBS | BODY MASS INDEX: 30.86 KG/M2 | HEIGHT: 66 IN | HEART RATE: 61 BPM | OXYGEN SATURATION: 96 % | SYSTOLIC BLOOD PRESSURE: 124 MMHG | DIASTOLIC BLOOD PRESSURE: 76 MMHG

## 2025-01-21 DIAGNOSIS — R10.11 RUQ ABDOMINAL PAIN: ICD-10-CM

## 2025-01-21 DIAGNOSIS — K82.4 GALLBLADDER POLYP: Primary | ICD-10-CM

## 2025-01-21 RX ORDER — METOPROLOL SUCCINATE 50 MG/1
TABLET, EXTENDED RELEASE ORAL
Qty: 90 TABLET | Refills: 3 | Status: SHIPPED | OUTPATIENT
Start: 2025-01-21

## 2025-01-22 NOTE — H&P (VIEW-ONLY)
General Surgery  Initial Office Visit    CC: Gallbladder polyp    HPI: The patient is a pleasant 74 y.o. year-old lady who presents today for discussion of a chronic gallbladder polyp that has been seen on multiple right upper quadrant ultrasounds. It was first detected in September 2022 on ultrasound when it measured 3 mm in size.  She had a follow-up ultrasound in April 2023 where it measured 4 mm in size.  Repeat ultrasound in November 2023 showed the polyp had increased to 5 mm in diameter.  It remained stable at 5 mm in size on follow-up ultrasound in May 2024.  Then on her most recent ultrasound January 2025 the polyp measured 9 mm x 6 mm. She endorses mild intermittent right upper quadrant abdominal pain over the last 2 years that seems to be spontaneous with some correlation to food intake and other times not. She denies any jaundice or scleral icterus and has had no unintentional weight loss.    Past Medical History:   Autoimmune hepatitis with cirrhosis  Hypertension  Hypercholesterolemia  Paroxysmal supraventricular tachycardia  Cobalamin deficiency  Type 2 diabetes  GERD  History of adenomatous colon polyps  Anxiety  Osteoarthritis  Obstructive sleep apnea    Past Surgical History:   EGD and colonoscopy (December 2022, Dr. Toth - lax LES with slipped Nissen wrap, grade A esophagitis of GE junction, multiple fundic gland polyps, no H Pylori or Barretts esophagus, 5 tubular adenomas of colon)  EGD and colonoscopy (March 2021, Dr. Toth - slightly loose Nissen wrap, multiple fundic gland polyps, negative for H. pylori or Nolan's esophagus, 10 tubular adenomas of the colon)  Laparoscopic hiatal hernia repair x 2 with Nissen fundoplication (Pop Soria, last done in 2014)  Tubal occasion  Hysterectomy  Inguinal hernia repair  Sinus surgery  Knee surgery    Medications:   Reviewed in epic and include aspirin/calcium carbonate/magnesium carbonate/magnesium oxide daily    Allergies: Fish oil, rocuronium  (anaphylaxis), shellfish (GI intolerance/nausea/vomiting)    Family History: Mother with history of Alzheimer dementia, father with history of end-stage renal disease, brother with history of gastric and lung cancer    Social History: , former smoker but quit in 1980, social alcohol use    ROS:   Constitutional: Negative for fevers or chills  HENT: Negative for hearing loss or runny nose  Eyes: Negative for vision changes or scleral icterus  Respiratory: Negative for cough or shortness of breath  Cardiovascular: Negative for chest pain or heart palpitations  Gastrointestinal: Positive for abdominal pain; negative for abdominal distension, nausea, vomiting, constipation, melena, or hematochezia  Genitourinary: Negative for hematuria or dysuria  Musculoskeletal: Negative for joint swelling or gait instability  Neurologic: Negative for tremors or seizures  Psychiatric: Negative for suicidal ideations or agitation  All other systems reviewed and negative    Physical Exam:  Height: 167 cm  Weight: 87 kg  BMI: 31.23  General: No acute distress, well-nourished & well-developed  HEAD: normocephalic, atraumatic  EYES: normal conjunctiva, sclera anicteric  EARS: grossly normal hearing  NECK: supple, no thyromegaly  CARDIOVASCULAR: regular rate and rhythm  RESPIRATORY: clear to auscultation bilaterally  GASTROINTESTINAL: soft, nontender, non-distended  MUSCULOSKELETAL: normal gait and station. No gross extremity abnormalities  PSYCHIATRIC: oriented x3, normal mood and affect    IMAGING:  ULTRASOUND LIVER (January 2025):  IMPRESSION:  1. Cirrhotic appearing liver.  2. No focal hepatic lesions are seen.  3. Approximately 6 mm x 9 mm probable gallbladder polyp. This has increased in size since the previous study of 11/6/2023 when it measured approximately 5 mm. Additional short-term follow-up gallbladder ultrasound in approximately 1 year suggested.  4. Right renal cysts.    ULTRASOUND LIVER (May 2024):  IMPRESSION:  1.  Hepatic cirrhotic morphology with similar appearance to previous ultrasounds.  2. Suspect 5 mm gallbladder polyp without change.  3. Two right lower pole renal cysts.    ULTRASOUND LIVER (Nov 2023):  IMPRESSION:  1.  Findings in keeping with patient history of cirrhosis. No definite sonographic findings of focal hepatic lesion or visualized on provided images.  2.  Presumed gallbladder polyp measuring up to 0.5 cm, as seen on multiple prior sonograms.  3.  Other findings as above.    ULTRASOUND LIVER (Sept 2022):  IMPRESSION:  Cirrhotic morphology of the liver with no focal hepatic lesion or biliary ductal dilatation. A 3 mm nonshadowing echogenic focus is adherent to the gallbladder wall believed to be a small polyp and identical to that demonstrated previously.    ULTRASOUND LIVER (March 2022):  IMPRESSION:  Stable cirrhotic liver. Tiny 3 mm presumed gallbladder polyp.    ASSESSMENT & PLAN  Mrs. Lemus is a 74-year-old lady with an enlarging gallbladder polyp now measuring 9 mm in diameter.  I reviewed a multitude of right upper quadrant ultrasounds as far back as March 2022 and most recently her ultrasound from January 2025.  The polyp has tripled in size over the last 3 years and is high risk for developing a gallbladder malignancy if left untreated.  Therefore, I would recommend she consider undergoing a laparoscopic cholecystectomy with cholangiogram. I discussed with her the risks of the procedure which include bleeding (increased for her given her autoimmune hepatitis and cirrhosis), wound infection, common bile duct injury, post-operative bile leak, or post-operative diarrhea. Despite the risks of surgery, she has consented to proceed.    Nevin Lara MD  General, Robotic, and Endoscopic Surgery  [facility], [place], operated by [facility]    [address], [address]  [place] [address]  P: [phone]  F: [phone]

## 2025-01-22 NOTE — PROGRESS NOTES
General Surgery  Initial Office Visit    CC: Gallbladder polyp    HPI: The patient is a pleasant 74 y.o. year-old lady who presents today for discussion of a chronic gallbladder polyp that has been seen on multiple right upper quadrant ultrasounds. It was first detected in September 2022 on ultrasound when it measured 3 mm in size.  She had a follow-up ultrasound in April 2023 where it measured 4 mm in size.  Repeat ultrasound in November 2023 showed the polyp had increased to 5 mm in diameter.  It remained stable at 5 mm in size on follow-up ultrasound in May 2024.  Then on her most recent ultrasound January 2025 the polyp measured 9 mm x 6 mm. She endorses mild intermittent right upper quadrant abdominal pain over the last 2 years that seems to be spontaneous with some correlation to food intake and other times not. She denies any jaundice or scleral icterus and has had no unintentional weight loss.    Past Medical History:   Autoimmune hepatitis with cirrhosis  Hypertension  Hypercholesterolemia  Paroxysmal supraventricular tachycardia  Cobalamin deficiency  Type 2 diabetes  GERD  History of adenomatous colon polyps  Anxiety  Osteoarthritis  Obstructive sleep apnea    Past Surgical History:   EGD and colonoscopy (December 2022, Dr. Toth - lax LES with slipped Nissen wrap, grade A esophagitis of GE junction, multiple fundic gland polyps, no H Pylori or Barretts esophagus, 5 tubular adenomas of colon)  EGD and colonoscopy (March 2021, Dr. Toth - slightly loose Nissen wrap, multiple fundic gland polyps, negative for H. pylori or Nolan's esophagus, 10 tubular adenomas of the colon)  Laparoscopic hiatal hernia repair x 2 with Nissen fundoplication (Pop Soria, last done in 2014)  Tubal occasion  Hysterectomy  Inguinal hernia repair  Sinus surgery  Knee surgery    Medications:   Reviewed in epic and include aspirin/calcium carbonate/magnesium carbonate/magnesium oxide daily    Allergies: Fish oil, rocuronium  (anaphylaxis), shellfish (GI intolerance/nausea/vomiting)    Family History: Mother with history of Alzheimer dementia, father with history of end-stage renal disease, brother with history of gastric and lung cancer    Social History: , former smoker but quit in 1980, social alcohol use    ROS:   Constitutional: Negative for fevers or chills  HENT: Negative for hearing loss or runny nose  Eyes: Negative for vision changes or scleral icterus  Respiratory: Negative for cough or shortness of breath  Cardiovascular: Negative for chest pain or heart palpitations  Gastrointestinal: Positive for abdominal pain; negative for abdominal distension, nausea, vomiting, constipation, melena, or hematochezia  Genitourinary: Negative for hematuria or dysuria  Musculoskeletal: Negative for joint swelling or gait instability  Neurologic: Negative for tremors or seizures  Psychiatric: Negative for suicidal ideations or agitation  All other systems reviewed and negative    Physical Exam:  Height: 167 cm  Weight: 87 kg  BMI: 31.23  General: No acute distress, well-nourished & well-developed  HEAD: normocephalic, atraumatic  EYES: normal conjunctiva, sclera anicteric  EARS: grossly normal hearing  NECK: supple, no thyromegaly  CARDIOVASCULAR: regular rate and rhythm  RESPIRATORY: clear to auscultation bilaterally  GASTROINTESTINAL: soft, nontender, non-distended  MUSCULOSKELETAL: normal gait and station. No gross extremity abnormalities  PSYCHIATRIC: oriented x3, normal mood and affect    IMAGING:  ULTRASOUND LIVER (January 2025):  IMPRESSION:  1. Cirrhotic appearing liver.  2. No focal hepatic lesions are seen.  3. Approximately 6 mm x 9 mm probable gallbladder polyp. This has increased in size since the previous study of 11/6/2023 when it measured approximately 5 mm. Additional short-term follow-up gallbladder ultrasound in approximately 1 year suggested.  4. Right renal cysts.    ULTRASOUND LIVER (May 2024):  IMPRESSION:  1.  Hepatic cirrhotic morphology with similar appearance to previous ultrasounds.  2. Suspect 5 mm gallbladder polyp without change.  3. Two right lower pole renal cysts.    ULTRASOUND LIVER (Nov 2023):  IMPRESSION:  1.  Findings in keeping with patient history of cirrhosis. No definite sonographic findings of focal hepatic lesion or visualized on provided images.  2.  Presumed gallbladder polyp measuring up to 0.5 cm, as seen on multiple prior sonograms.  3.  Other findings as above.    ULTRASOUND LIVER (Sept 2022):  IMPRESSION:  Cirrhotic morphology of the liver with no focal hepatic lesion or biliary ductal dilatation. A 3 mm nonshadowing echogenic focus is adherent to the gallbladder wall believed to be a small polyp and identical to that demonstrated previously.    ULTRASOUND LIVER (March 2022):  IMPRESSION:  Stable cirrhotic liver. Tiny 3 mm presumed gallbladder polyp.    ASSESSMENT & PLAN  Mrs. Lemus is a 74-year-old lady with an enlarging gallbladder polyp now measuring 9 mm in diameter.  I reviewed a multitude of right upper quadrant ultrasounds as far back as March 2022 and most recently her ultrasound from January 2025.  The polyp has tripled in size over the last 3 years and is high risk for developing a gallbladder malignancy if left untreated.  Therefore, I would recommend she consider undergoing a laparoscopic cholecystectomy with cholangiogram. I discussed with her the risks of the procedure which include bleeding (increased for her given her autoimmune hepatitis and cirrhosis), wound infection, common bile duct injury, post-operative bile leak, or post-operative diarrhea. Despite the risks of surgery, she has consented to proceed.    Nevin Lara MD  General, Robotic, and Endoscopic Surgery  [facility]    [address], [address]  [place] [address]  P: [phone]  F: [phone]

## 2025-02-03 ENCOUNTER — TELEPHONE (OUTPATIENT)
Dept: INTERNAL MEDICINE | Facility: CLINIC | Age: 75
End: 2025-02-03
Payer: MEDICARE

## 2025-02-03 DIAGNOSIS — I10 PRIMARY HYPERTENSION: Primary | ICD-10-CM

## 2025-02-03 RX ORDER — METOPROLOL SUCCINATE 50 MG/1
50 TABLET, EXTENDED RELEASE ORAL DAILY
Qty: 8 TABLET | Refills: 0 | Status: SHIPPED | OUTPATIENT
Start: 2025-02-03

## 2025-02-03 NOTE — TELEPHONE ENCOUNTER
"    Caller: Esme Lemus \"PAT\"    Relationship: Self    Best call back number: [phone]     Requested Prescriptions:     metoprolol succinate XL (TOPROL-XL) 50 MG 24 hr tablet     Requested Prescriptions      No prescriptions requested or ordered in this encounter        Pharmacy where request should be sent:        [facility]/pharmacy #[id number] - [place] [address] - [phone] [facility] [phone]  [phone]           Last office visit with prescribing clinician: 12/9/2024   Last telemedicine visit with prescribing clinician: Visit date not found   Next office visit with prescribing clinician: 6/16/2025     Additional details provided by patient: PATIENT IS CALLING TO REQUEST AN EMERGENCY SUPPLY OF THE ABOVE  MEDICATION TO LAST UNTIL SHE GETS THROUGH THE MAIL.  SHE STATES WAS TOLD BY [facility] THAT AT LEAST 7 DAYS.    Does the patient have less than a 3 day supply:  [x] Yes  [] No    Would you like a call back once the refill request has been completed: [] Yes [] No    If the office needs to give you a call back, can they leave a voicemail: [] Yes [] No    Juanito Alvarado Rep   02/03/25 08:26 EST     PLEASE ADVISE.        "

## 2025-02-11 ENCOUNTER — PRE-ADMISSION TESTING (OUTPATIENT)
Dept: PREADMISSION TESTING | Facility: HOSPITAL | Age: 75
End: 2025-02-11
Payer: MEDICARE

## 2025-02-11 VITALS
DIASTOLIC BLOOD PRESSURE: 74 MMHG | RESPIRATION RATE: 20 BRPM | WEIGHT: 195 LBS | HEIGHT: 65 IN | SYSTOLIC BLOOD PRESSURE: 136 MMHG | TEMPERATURE: 97.9 F | HEART RATE: 75 BPM | BODY MASS INDEX: 32.49 KG/M2 | OXYGEN SATURATION: 96 %

## 2025-02-11 DIAGNOSIS — R10.11 RUQ ABDOMINAL PAIN: ICD-10-CM

## 2025-02-11 DIAGNOSIS — K82.4 GALLBLADDER POLYP: ICD-10-CM

## 2025-02-11 LAB
ALBUMIN SERPL-MCNC: 4 G/DL (ref 3.5–5.2)
ALBUMIN/GLOB SERPL: 1.5 G/DL
ALP SERPL-CCNC: 73 U/L (ref 39–117)
ALT SERPL W P-5'-P-CCNC: 23 U/L (ref 1–33)
ANION GAP SERPL CALCULATED.3IONS-SCNC: 8 MMOL/L (ref 5–15)
AST SERPL-CCNC: 30 U/L (ref 1–32)
BILIRUB SERPL-MCNC: 1.2 MG/DL (ref 0–1.2)
BUN SERPL-MCNC: 11 MG/DL (ref 8–23)
BUN/CREAT SERPL: 14.3 (ref 7–25)
CALCIUM SPEC-SCNC: 9.8 MG/DL (ref 8.6–10.5)
CHLORIDE SERPL-SCNC: 107 MMOL/L (ref 98–107)
CO2 SERPL-SCNC: 25 MMOL/L (ref 22–29)
CREAT SERPL-MCNC: 0.77 MG/DL (ref 0.57–1)
DEPRECATED RDW RBC AUTO: 42.8 FL (ref 37–54)
EGFRCR SERPLBLD CKD-EPI 2021: 80.6 ML/MIN/1.73
ERYTHROCYTE [DISTWIDTH] IN BLOOD BY AUTOMATED COUNT: 12.5 % (ref 12.3–15.4)
GLOBULIN UR ELPH-MCNC: 2.7 GM/DL
GLUCOSE SERPL-MCNC: 104 MG/DL (ref 65–99)
HCT VFR BLD AUTO: 42.5 % (ref 34–46.6)
HGB BLD-MCNC: 14.2 G/DL (ref 12–15.9)
MCH RBC QN AUTO: 31.4 PG (ref 26.6–33)
MCHC RBC AUTO-ENTMCNC: 33.4 G/DL (ref 31.5–35.7)
MCV RBC AUTO: 94 FL (ref 79–97)
PLATELET # BLD AUTO: 152 10*3/MM3 (ref 140–450)
PMV BLD AUTO: 9.5 FL (ref 6–12)
POTASSIUM SERPL-SCNC: 3.7 MMOL/L (ref 3.5–5.2)
PROT SERPL-MCNC: 6.7 G/DL (ref 6–8.5)
QT INTERVAL: 414 MS
QTC INTERVAL: 414 MS
RBC # BLD AUTO: 4.52 10*6/MM3 (ref 3.77–5.28)
SODIUM SERPL-SCNC: 140 MMOL/L (ref 136–145)
WBC NRBC COR # BLD AUTO: 3.78 10*3/MM3 (ref 3.4–10.8)

## 2025-02-11 PROCEDURE — 80053 COMPREHEN METABOLIC PANEL: CPT

## 2025-02-11 PROCEDURE — 85027 COMPLETE CBC AUTOMATED: CPT

## 2025-02-11 PROCEDURE — 93005 ELECTROCARDIOGRAM TRACING: CPT

## 2025-02-11 PROCEDURE — 36415 COLL VENOUS BLD VENIPUNCTURE: CPT

## 2025-02-11 NOTE — DISCHARGE INSTRUCTIONS
Take the following medications the morning of surgery:    Metoprolol   protonix      If you are on prescription narcotic pain medication to control your pain you may also take that medication the morning of surgery.      General Instructions:     Do not eat solid food after midnight the night before surgery.  Clear liquids day of surgery are allowed but must be stopped at least two hours before your hospital arrival time.       Allowed clear liquids      Water, sodas, and tea or coffee with no cream or milk added.       12 to 20 ounces of a clear liquid that contains carbohydrates is recommended.  If non-diabetic, have Gatorade or Powerade.  If diabetic, have G2 or Powerade Zero.     Do not have liquids red in color.  Do not consume chicken, beef, pork or vegetable broth or bouillon cubes of any variety as they are not considered clear liquids and are not allowed.      Infants may have breast milk up to four hours before surgery.  Infants drinking formula may drink formula up to six hours before surgery.   Patients who avoid smoking, chewing tobacco and alcohol for 4 weeks prior to surgery have a reduced risk of post-operative complications.  Quit smoking as many days before surgery as you can.  Do not smoke, use chewing tobacco or drink alcohol the day of surgery.   If applicable bring your C-PAP/ BI-PAP machine in with you to preop day of surgery.  Bring any papers given to you in the doctor’s office.  Wear clean comfortable clothes.  Do not wear contact lenses, false eyelashes or make-up.  Bring a case for your glasses.   Bring crutches or walker if applicable.  Remove all piercings.  Leave jewelry and any other valuables at home.  Hair extensions with metal clips must be removed prior to surgery.  The Pre-Admission Testing nurse will instruct you to bring medications if unable to obtain an accurate list in Pre-Admission Testing.        If you were given a blood bank ID arm band remember to bring it with you the  day of surgery.    Preventing a Surgical Site Infection:  For 2 to 3 days before surgery, avoid shaving with a razor because the razor can irritate skin and make it easier to develop an infection.    Any areas of open skin can increase the risk of a post-operative wound infection by allowing bacteria to enter and travel throughout the body.  Notify your surgeon if you have any skin wounds / rashes even if it is not near the expected surgical site.  The area will need assessed to determine if surgery should be delayed until it is healed.  The night prior to surgery shower using a fresh bar of anti-bacterial soap (such as Dial) and clean washcloth.  Sleep in a clean bed with clean clothing.  Do not allow pets to sleep with you.  Shower on the morning of surgery using a fresh bar of anti-bacterial soap (such as Dial) and clean washcloth.  Dry with a clean towel and dress in clean clothing.  Ask your surgeon if you will be receiving antibiotics prior to surgery.  Make sure you, your family, and all healthcare providers clean their hands with soap and water or an alcohol based hand  before caring for you or your wound.    Day of surgery:2/18/2025  Your arrival time is approximately two hours before your scheduled surgery time.  Please note if you have an early arrival time the surgery doors do not open before 5:00 AM.  Upon arrival, a Pre-op nurse and Anesthesiologist will review your health history, obtain vital signs, and answer questions you may have.  The only belongings needed at this time will be a list of your home medications and if applicable your C-PAP/BI-PAP machine.  A Pre-op nurse will start an IV and you may receive medication in preparation for surgery, including something to help you relax.     Please be aware that surgery does come with discomfort.  We want to make every effort to control your discomfort so please discuss any uncontrolled symptoms with your nurse.   Your doctor will most likely  have prescribed pain medications.      If you are going home after surgery you will receive individualized written care instructions before being discharged.  A responsible adult must drive you to and from the hospital on the day of your surgery and ideally stay with you through the night.   .  Discharge prescriptions can be filled by the hospital pharmacy during regular pharmacy hours.  If you are having surgery late in the day/evening your prescription may be e-prescribed to your pharmacy.  Please verify your pharmacy hours or chose a 24 hour pharmacy to avoid not having access to your prescription because your pharmacy has closed for the day.    If you are staying overnight following surgery, you will be transported to your hospital room following the recovery period.  [facility] has all private rooms.    If you have any questions please call Pre-Admission Testing at [phone].  Deductibles and co-payments are collected on the day of service. Please be prepared to pay the required co-pay, deductible or deposit on the day of service as defined by your plan.    Call your surgeon immediately if you experience any of the following symptoms:  Sore Throat  Shortness of Breath or difficulty breathing  Cough  Chills  Body soreness or muscle pain  Headache  Fever  New loss of taste or smell  Do not arrive for your surgery ill.  Your procedure will need to be rescheduled to another time.  You will need to call your physician before the day of surgery to avoid any unnecessary exposure to hospital staff as well as other patients.  CHLORHEXIDINE CLOTH INSTRUCTIONS  The morning of surgery follow these instructions using the Chlorhexidine cloths you've been given.  These steps reduce bacteria on the body.  Do not use the cloths near your eyes, ears mouth, genitalia or on open wounds.  Throw the cloths away after use but do not try to flush them down a toilet.      Open and remove one cloth at a time from the  package.    Leave the cloth unfolded and begin the bathing.  Massage the skin with the cloths using gentle pressure to remove bacteria.  Do not scrub harshly.   Follow the steps below with one 2% CHG cloth per area (6 total cloths).  One cloth for neck, shoulders and chest.  One cloth for both arms, hands, fingers and underarms (do underarms last).  One cloth for the abdomen followed by groin.  One cloth for right leg and foot including between the toes.  One cloth for left leg and foot including between the toes.  The last cloth is to be used for the back of the neck, back and buttocks.    Allow the CHG to air dry 3 minutes on the skin which will give it time to work and decrease the chance of irritation.  The skin may feel sticky until it is dry.  Do not rinse with water or any other liquid or you will lose the beneficial effects of the CHG.  If mild skin irritation occurs, do rinse the skin to remove the CHG.  Report this to the nurse at time of admission.  Do not apply lotions, creams, ointments, deodorants or perfumes after using the clothes. Dress in clean clothes before coming to the hospital.

## 2025-02-18 ENCOUNTER — HOSPITAL ENCOUNTER (OUTPATIENT)
Facility: HOSPITAL | Age: 75
Setting detail: HOSPITAL OUTPATIENT SURGERY
Discharge: HOME | End: 2025-02-18
Attending: SURGERY | Admitting: SURGERY
Payer: MEDICARE

## 2025-02-18 ENCOUNTER — APPOINTMENT (OUTPATIENT)
Dept: GENERAL RADIOLOGY | Facility: HOSPITAL | Age: 75
End: 2025-02-18
Payer: MEDICARE

## 2025-02-18 ENCOUNTER — ANESTHESIA (OUTPATIENT)
Dept: PERIOP | Facility: HOSPITAL | Age: 75
End: 2025-02-18
Payer: MEDICARE

## 2025-02-18 ENCOUNTER — ANESTHESIA EVENT (OUTPATIENT)
Dept: PERIOP | Facility: HOSPITAL | Age: 75
End: 2025-02-18
Payer: MEDICARE

## 2025-02-18 VITALS
RESPIRATION RATE: 16 BRPM | DIASTOLIC BLOOD PRESSURE: 63 MMHG | OXYGEN SATURATION: 92 % | SYSTOLIC BLOOD PRESSURE: 126 MMHG | TEMPERATURE: 98.6 F | HEART RATE: 56 BPM

## 2025-02-18 DIAGNOSIS — K82.4 GALLBLADDER POLYP: Primary | ICD-10-CM

## 2025-02-18 LAB — GLUCOSE BLDC GLUCOMTR-MCNC: 98 MG/DL (ref 70–130)

## 2025-02-18 PROCEDURE — 25010000002 HYDROMORPHONE 1 MG/ML SOLUTION: Performed by: NURSE ANESTHETIST, CERTIFIED REGISTERED

## 2025-02-18 PROCEDURE — 25010000002 GLYCOPYRROLATE 0.2 MG/ML SOLUTION

## 2025-02-18 PROCEDURE — 25010000002 CEFAZOLIN PER 500 MG: Performed by: SURGERY

## 2025-02-18 PROCEDURE — 82948 REAGENT STRIP/BLOOD GLUCOSE: CPT

## 2025-02-18 PROCEDURE — 74300 X-RAY BILE DUCTS/PANCREAS: CPT

## 2025-02-18 PROCEDURE — 25010000002 NEOSTIGMINE 5 MG/10ML SOLUTION: Performed by: NURSE ANESTHETIST, CERTIFIED REGISTERED

## 2025-02-18 PROCEDURE — 25810000003 SODIUM CHLORIDE PER 500 ML: Performed by: SURGERY

## 2025-02-18 PROCEDURE — 25010000002 DEXAMETHASONE SODIUM PHOSPHATE 20 MG/5ML SOLUTION

## 2025-02-18 PROCEDURE — 25010000002 PROPOFOL 10 MG/ML EMULSION

## 2025-02-18 PROCEDURE — 88304 TISSUE EXAM BY PATHOLOGIST: CPT | Performed by: SURGERY

## 2025-02-18 PROCEDURE — 25010000002 FENTANYL CITRATE (PF) 50 MCG/ML SOLUTION

## 2025-02-18 PROCEDURE — 25010000002 PHENYLEPHRINE 10 MG/ML SOLUTION: Performed by: NURSE ANESTHETIST, CERTIFIED REGISTERED

## 2025-02-18 PROCEDURE — 25010000002 SUCCINYLCHOLINE PER 20 MG

## 2025-02-18 PROCEDURE — 25510000002 IOTHALAMATE 60 % SOLUTION: Performed by: SURGERY

## 2025-02-18 PROCEDURE — 25010000002 LIDOCAINE 2% SOLUTION

## 2025-02-18 PROCEDURE — 25010000002 ONDANSETRON PER 1 MG: Performed by: NURSE ANESTHETIST, CERTIFIED REGISTERED

## 2025-02-18 PROCEDURE — 25810000003 LACTATED RINGERS PER 1000 ML

## 2025-02-18 DEVICE — LIGAMAX 5 MM ENDOSCOPIC MULTIPLE CLIP APPLIER
Type: IMPLANTABLE DEVICE | Site: ABDOMEN | Status: FUNCTIONAL
Brand: LIGAMAX

## 2025-02-18 RX ORDER — SODIUM CHLORIDE 9 MG/ML
INJECTION, SOLUTION INTRAVENOUS AS NEEDED
Status: DISCONTINUED | OUTPATIENT
Start: 2025-02-18 | End: 2025-02-18 | Stop reason: HOSPADM

## 2025-02-18 RX ORDER — HYDROCODONE BITARTRATE AND ACETAMINOPHEN 5; 325 MG/1; MG/1
1 TABLET ORAL ONCE AS NEEDED
Status: DISCONTINUED | OUTPATIENT
Start: 2025-02-18 | End: 2025-02-18 | Stop reason: HOSPADM

## 2025-02-18 RX ORDER — LABETALOL HYDROCHLORIDE 5 MG/ML
5 INJECTION, SOLUTION INTRAVENOUS
Status: DISCONTINUED | OUTPATIENT
Start: 2025-02-18 | End: 2025-02-18 | Stop reason: HOSPADM

## 2025-02-18 RX ORDER — FENTANYL CITRATE 50 UG/ML
INJECTION, SOLUTION INTRAMUSCULAR; INTRAVENOUS AS NEEDED
Status: DISCONTINUED | OUTPATIENT
Start: 2025-02-18 | End: 2025-02-18 | Stop reason: SURG

## 2025-02-18 RX ORDER — EPHEDRINE SULFATE 50 MG/ML
5 INJECTION, SOLUTION INTRAVENOUS ONCE AS NEEDED
Status: DISCONTINUED | OUTPATIENT
Start: 2025-02-18 | End: 2025-02-18 | Stop reason: HOSPADM

## 2025-02-18 RX ORDER — PROMETHAZINE HYDROCHLORIDE 25 MG/1
25 SUPPOSITORY RECTAL ONCE AS NEEDED
Status: DISCONTINUED | OUTPATIENT
Start: 2025-02-18 | End: 2025-02-18 | Stop reason: HOSPADM

## 2025-02-18 RX ORDER — ONDANSETRON 2 MG/ML
4 INJECTION INTRAMUSCULAR; INTRAVENOUS ONCE AS NEEDED
Status: DISCONTINUED | OUTPATIENT
Start: 2025-02-18 | End: 2025-02-18 | Stop reason: HOSPADM

## 2025-02-18 RX ORDER — BUPIVACAINE HYDROCHLORIDE AND EPINEPHRINE 5; 5 MG/ML; UG/ML
INJECTION, SOLUTION EPIDURAL; INTRACAUDAL; PERINEURAL AS NEEDED
Status: DISCONTINUED | OUTPATIENT
Start: 2025-02-18 | End: 2025-02-18 | Stop reason: HOSPADM

## 2025-02-18 RX ORDER — GLYCOPYRROLATE 0.2 MG/ML
INJECTION INTRAMUSCULAR; INTRAVENOUS AS NEEDED
Status: DISCONTINUED | OUTPATIENT
Start: 2025-02-18 | End: 2025-02-18 | Stop reason: SURG

## 2025-02-18 RX ORDER — FLUMAZENIL 0.1 MG/ML
0.2 INJECTION INTRAVENOUS AS NEEDED
Status: DISCONTINUED | OUTPATIENT
Start: 2025-02-18 | End: 2025-02-18 | Stop reason: HOSPADM

## 2025-02-18 RX ORDER — HYDROMORPHONE HYDROCHLORIDE 1 MG/ML
0.25 INJECTION, SOLUTION INTRAMUSCULAR; INTRAVENOUS; SUBCUTANEOUS
Status: DISCONTINUED | OUTPATIENT
Start: 2025-02-18 | End: 2025-02-18 | Stop reason: HOSPADM

## 2025-02-18 RX ORDER — CISATRACURIUM BESYLATE 2 MG/ML
0.1 INJECTION, SOLUTION INTRAVENOUS ONCE
Status: COMPLETED | OUTPATIENT
Start: 2025-02-18 | End: 2025-02-18

## 2025-02-18 RX ORDER — FENTANYL CITRATE 50 UG/ML
25 INJECTION, SOLUTION INTRAMUSCULAR; INTRAVENOUS
Status: DISCONTINUED | OUTPATIENT
Start: 2025-02-18 | End: 2025-02-18 | Stop reason: HOSPADM

## 2025-02-18 RX ORDER — NEOSTIGMINE METHYLSULFATE 0.5 MG/ML
INJECTION INTRAVENOUS AS NEEDED
Status: DISCONTINUED | OUTPATIENT
Start: 2025-02-18 | End: 2025-02-18 | Stop reason: SURG

## 2025-02-18 RX ORDER — PHENYLEPHRINE HYDROCHLORIDE 10 MG/ML
INJECTION INTRAVENOUS AS NEEDED
Status: DISCONTINUED | OUTPATIENT
Start: 2025-02-18 | End: 2025-02-18 | Stop reason: SURG

## 2025-02-18 RX ORDER — SODIUM CHLORIDE, SODIUM LACTATE, POTASSIUM CHLORIDE, CALCIUM CHLORIDE 600; 310; 30; 20 MG/100ML; MG/100ML; MG/100ML; MG/100ML
INJECTION, SOLUTION INTRAVENOUS CONTINUOUS PRN
Status: DISCONTINUED | OUTPATIENT
Start: 2025-02-18 | End: 2025-02-18 | Stop reason: SURG

## 2025-02-18 RX ORDER — PROPOFOL 10 MG/ML
VIAL (ML) INTRAVENOUS AS NEEDED
Status: DISCONTINUED | OUTPATIENT
Start: 2025-02-18 | End: 2025-02-18 | Stop reason: SURG

## 2025-02-18 RX ORDER — ATROPINE SULFATE 0.4 MG/ML
0.4 INJECTION, SOLUTION INTRAMUSCULAR; INTRAVENOUS; SUBCUTANEOUS ONCE AS NEEDED
Status: DISCONTINUED | OUTPATIENT
Start: 2025-02-18 | End: 2025-02-18 | Stop reason: HOSPADM

## 2025-02-18 RX ORDER — MAGNESIUM HYDROXIDE 1200 MG/15ML
LIQUID ORAL AS NEEDED
Status: DISCONTINUED | OUTPATIENT
Start: 2025-02-18 | End: 2025-02-18 | Stop reason: HOSPADM

## 2025-02-18 RX ORDER — ONDANSETRON 2 MG/ML
INJECTION INTRAMUSCULAR; INTRAVENOUS AS NEEDED
Status: DISCONTINUED | OUTPATIENT
Start: 2025-02-18 | End: 2025-02-18 | Stop reason: SURG

## 2025-02-18 RX ORDER — SUCCINYLCHOLINE CHLORIDE 20 MG/ML
INJECTION INTRAMUSCULAR; INTRAVENOUS AS NEEDED
Status: DISCONTINUED | OUTPATIENT
Start: 2025-02-18 | End: 2025-02-18 | Stop reason: SURG

## 2025-02-18 RX ORDER — PROMETHAZINE HYDROCHLORIDE 25 MG/1
25 TABLET ORAL ONCE AS NEEDED
Status: DISCONTINUED | OUTPATIENT
Start: 2025-02-18 | End: 2025-02-18 | Stop reason: HOSPADM

## 2025-02-18 RX ORDER — HYDROCODONE BITARTRATE AND ACETAMINOPHEN 7.5; 325 MG/1; MG/1
1 TABLET ORAL EVERY 4 HOURS PRN
Status: DISCONTINUED | OUTPATIENT
Start: 2025-02-18 | End: 2025-02-18 | Stop reason: HOSPADM

## 2025-02-18 RX ORDER — DIPHENHYDRAMINE HYDROCHLORIDE 50 MG/ML
12.5 INJECTION INTRAMUSCULAR; INTRAVENOUS
Status: DISCONTINUED | OUTPATIENT
Start: 2025-02-18 | End: 2025-02-18 | Stop reason: HOSPADM

## 2025-02-18 RX ORDER — LIDOCAINE HYDROCHLORIDE 20 MG/ML
INJECTION, SOLUTION INFILTRATION; PERINEURAL AS NEEDED
Status: DISCONTINUED | OUTPATIENT
Start: 2025-02-18 | End: 2025-02-18 | Stop reason: SURG

## 2025-02-18 RX ORDER — DEXAMETHASONE SODIUM PHOSPHATE 4 MG/ML
INJECTION, SOLUTION INTRA-ARTICULAR; INTRALESIONAL; INTRAMUSCULAR; INTRAVENOUS; SOFT TISSUE AS NEEDED
Status: DISCONTINUED | OUTPATIENT
Start: 2025-02-18 | End: 2025-02-18 | Stop reason: SURG

## 2025-02-18 RX ORDER — IPRATROPIUM BROMIDE AND ALBUTEROL SULFATE 2.5; .5 MG/3ML; MG/3ML
3 SOLUTION RESPIRATORY (INHALATION) ONCE AS NEEDED
Status: DISCONTINUED | OUTPATIENT
Start: 2025-02-18 | End: 2025-02-18 | Stop reason: HOSPADM

## 2025-02-18 RX ORDER — HYDRALAZINE HYDROCHLORIDE 20 MG/ML
5 INJECTION INTRAMUSCULAR; INTRAVENOUS
Status: DISCONTINUED | OUTPATIENT
Start: 2025-02-18 | End: 2025-02-18 | Stop reason: HOSPADM

## 2025-02-18 RX ORDER — OXYCODONE AND ACETAMINOPHEN 5; 325 MG/1; MG/1
1 TABLET ORAL EVERY 4 HOURS PRN
Qty: 15 TABLET | Refills: 0 | Status: SHIPPED | OUTPATIENT
Start: 2025-02-18

## 2025-02-18 RX ORDER — NALOXONE HCL 0.4 MG/ML
0.2 VIAL (ML) INJECTION AS NEEDED
Status: DISCONTINUED | OUTPATIENT
Start: 2025-02-18 | End: 2025-02-18 | Stop reason: HOSPADM

## 2025-02-18 RX ADMIN — HYDROMORPHONE HYDROCHLORIDE 0.5 MG: 1 INJECTION, SOLUTION INTRAMUSCULAR; INTRAVENOUS; SUBCUTANEOUS at 14:42

## 2025-02-18 RX ADMIN — ONDANSETRON 4 MG: 2 INJECTION, SOLUTION INTRAMUSCULAR; INTRAVENOUS at 14:55

## 2025-02-18 RX ADMIN — CISATRACURIUM BESYLATE 8 MG: 2 INJECTION, SOLUTION INTRAVENOUS at 14:23

## 2025-02-18 RX ADMIN — SUCCINYLCHOLINE CHLORIDE 100 MG: 20 INJECTION, SOLUTION INTRAMUSCULAR; INTRAVENOUS; PARENTERAL at 14:16

## 2025-02-18 RX ADMIN — PHENYLEPHRINE HYDROCHLORIDE 100 MCG: 10 INJECTION INTRAVENOUS at 14:52

## 2025-02-18 RX ADMIN — GLYCOPYRROLATE 0.2 MG: 0.2 INJECTION INTRAMUSCULAR; INTRAVENOUS at 14:34

## 2025-02-18 RX ADMIN — DEXAMETHASONE SODIUM PHOSPHATE 4 MG: 4 INJECTION, SOLUTION INTRAMUSCULAR; INTRAVENOUS at 14:33

## 2025-02-18 RX ADMIN — GLYCOPYRROLATE 0.2 MG: 0.2 INJECTION INTRAMUSCULAR; INTRAVENOUS at 15:00

## 2025-02-18 RX ADMIN — SODIUM CHLORIDE, POTASSIUM CHLORIDE, SODIUM LACTATE AND CALCIUM CHLORIDE: 600; 310; 30; 20 INJECTION, SOLUTION INTRAVENOUS at 14:06

## 2025-02-18 RX ADMIN — FENTANYL CITRATE 50 MCG: 50 INJECTION, SOLUTION INTRAMUSCULAR; INTRAVENOUS at 14:15

## 2025-02-18 RX ADMIN — HYDROCODONE BITARTRATE AND ACETAMINOPHEN 1 TABLET: 7.5; 325 TABLET ORAL at 15:46

## 2025-02-18 RX ADMIN — PROPOFOL 180 MG: 10 INJECTION, EMULSION INTRAVENOUS at 14:15

## 2025-02-18 RX ADMIN — NEOSTIGMINE METHYLSULFATE 4 MG: 0.5 INJECTION INTRAVENOUS at 15:00

## 2025-02-18 RX ADMIN — LIDOCAINE HYDROCHLORIDE 100 MG: 20 INJECTION, SOLUTION INFILTRATION; PERINEURAL at 14:15

## 2025-02-18 RX ADMIN — CEFAZOLIN 2000 MG: 2 INJECTION, POWDER, FOR SOLUTION INTRAMUSCULAR; INTRAVENOUS at 14:00

## 2025-02-18 RX ADMIN — NEOSTIGMINE METHYLSULFATE 1 MG: 0.5 INJECTION INTRAVENOUS at 15:11

## 2025-02-18 NOTE — OP NOTE
Operative Note :  MD Brittaney Nunesdeanna Lemus  1950    Procedure Date: 02/18/25    Pre-op Diagnosis:  Gallbladder polyp [K82.4]    Post-op Diagnosis:  Gallbladder polyp [K82.4]    Procedure:   Laparoscopic cholecystectomy with cholangiogram    Surgeon: Nevin Lara MD    Assistant: Lisbeth Lowry CSA (Lisbeth was responsible for laparoscopic manipulation of the gallbladder during critical portions of the dissection, handling of the laparoscopic camera, closure of all surgical incisions, and application of topical sterile dressings)    Anesthesia:  General (general endotracheal tube)    Estimated Blood Loss: minimal    Specimens:  Gallbladder    Complications: None    Indications: The patient is a 74-year lady who presents today for laparoscopic cholecystectomy given enlargement of multiple gallbladder polyps, the largest of which now measures 9 mm in diameter and has tripled in size over the last few years.  She understands with polyp growth, it increases the risk for adenomatous tissue development that could eventually progress to a gallbladder malignancy.  She was counseled on the risks of cholecystectomy which include bleeding, wound infection, common bile duct injury, postoperative bile leak, and postprandial diarrhea.  Despite these risks, she has consented to proceed.    Findings: Normal cholangiogram    Description of procedure:  The patient was brought to the operating room and placed on the OR table in supine position.  An endotracheal tube was inserted, and general anesthesia was induced.  The anterior abdominal wall was shaved, prepped, and draped in a sterile fashion.  A surgical timeout was then completed.  A curvilinear infraumbilical skin incision was made after instillation of 0.5% Marcaine with epinephrine.  The umbilical stalk was grasped and elevated, and a longitudinal fasciotomy made.  A Naidu trocar was inserted and secured with 2 separate 0 Vicryl stay sutures.  The  abdomen was then insufflated.  Under direct visualization, 3 additional 5 mm trocars were then placed within the subxiphoid and subcostal space on the right.  The gallbladder was retracted cephalad and infundibulum retracted inferiorly. The cystic duct and cystic artery were slowly dissected out circumferentially until a firm critical view was obtained.  A single Hemoclip was placed across the cystic duct at its junction with the gallbladder infundibulum and 3 hemoclips were placed across the cystic artery.  A small hole was created on the anterior wall of the cystic duct, and a cholangiogram catheter inserted through a right upper quadrant angiocatheter.  The catheter was secured within the duct with an additional Hemoclip and a cholangiogram was then obtained.  The cholangiogram showed filling of the cystic duct and common bile duct, retrograde filling of the intrahepatic ducts, and spillage into the duodenum with no filling defects appreciable.  The catheter was then withdrawn and 2 additional hemoclips placed across the cystic duct.  The cystic duct and artery were then transected, leaving 2 clips behind on both of the stumps.  The gallbladder was then slowly dissected off of the undersurface of the liver using electrocautery and it was removed from the peritoneal cavity within an Endo Catch bag at the umbilical trocar site.  The gallbladder was passed off to pathology.  The abdomen was then reinsufflated and right upper quadrant inspected. Warm normal saline was irrigated and suctioned dry.  The gallbladder fossa appeared hemostatic.  All trocars were then removed under direct visualization and the abdomen desufflated.  The umbilical fascial incision was closed using a new 0 Vicryl figure-of-eight suture, all skin incisions closed with 4-0 Vicryl subcuticular stitches, and then each was dressed with Exofin glue.  The patient was then extubated and transferred to PACU in stable condition.  All counts were  correct per nursing.    Nevin Lara MD  General, Robotic, and Endoscopic Surgery  [facility]    [address], [address]  [place] [address]  P: [phone]  F: [phone]

## 2025-02-18 NOTE — ANESTHESIA POSTPROCEDURE EVALUATION
Patient: Esme Lemus    Procedure Summary       Date: 02/18/25 Room / Location: [facility] OR [address] [place] [address] MAIN OR    Anesthesia Start: 1406 Anesthesia Stop: 1523    Procedure: LAPAROSCOPIC CHOLECYSTECTOMY WITH INTRAOPERATIVE CHOLANGIOGRAM, POSSIBLE OPEN (Abdomen) Diagnosis:       Gallbladder polyp      (Gallbladder polyp [K82.4])    Surgeons: Nevin Lara MD Provider: Jacqueline Lopez MD    Anesthesia Type: general ASA Status: 3            Anesthesia Type: general    Vitals  Vitals Value Taken Time   /65 02/18/25 1645   Temp     Pulse 52 02/18/25 1649   Resp 16 02/18/25 1645   SpO2 90 % 02/18/25 1649   Vitals shown include unfiled device data.        Post Anesthesia Care and Evaluation    Level of consciousness: awake and alert  Pain management: adequate    Airway patency: patent  Anesthetic complications: No anesthetic complications  PONV Status: none  Cardiovascular status: acceptable  Respiratory status: acceptable  Hydration status: acceptable    Comments: /61   Pulse 51   Temp 37 °C (98.6 °F) (Oral)   Resp 16   SpO2 90%

## 2025-02-18 NOTE — ANESTHESIA PROCEDURE NOTES
Airway  Urgency: elective    Date/Time: 2/18/2025 2:19 PM  Airway not difficult    General Information and Staff    Patient location during procedure: OR  Anesthesiologist: David Reddy MD  CRNA/CAA: Zenaida Frey CRNA    Indications and Patient Condition  Indications for airway management: airway protection    Preoxygenated: yes  MILS not maintained throughout  Mask difficulty assessment: 2 - vent by mask + OA or adjuvant +/- NMBA    Final Airway Details  Final airway type: endotracheal airway      Successful airway: ETT  Cuffed: yes   Successful intubation technique: direct laryngoscopy  Facilitating devices/methods: intubating stylet and cricoid pressure  Endotracheal tube insertion site: oral  Blade: Carlos  Blade size: 3  ETT size (mm): 7.0  Cormack-Lehane Classification: grade IIa - partial view of glottis  Placement verified by: chest auscultation and capnometry   Cuff volume (mL): 8  Measured from: lips  ETT/EBT  to lips (cm): 20  Number of attempts at approach: 1  Assessment: lips, teeth, and gum same as pre-op and atraumatic intubation    Additional Comments  Airway exam prior to DL, teeth/lips inspected. Preoxygenated with 100% O2; sniffing position, easy mask ventilation. Eyes taped. Atraumatic intubation. Lips and teeth intact, no damage. ETT connected to vent. Confirmed EBBS, +EtCO2.

## 2025-02-18 NOTE — ANESTHESIA PREPROCEDURE EVALUATION
Anesthesia Evaluation     Patient summary reviewed and Nursing notes reviewed   NPO Solid Status: > 8 hours  NPO Liquid Status: > 2 hours           Airway   Mallampati: II  TM distance: >3 FB  Neck ROM: full  No difficulty expected  Dental    (+) implants        Pulmonary    (+) ,sleep apnea (no tx)  Cardiovascular     ECG reviewed  Patient on routine beta blocker and Beta blocker given within 24 hours of surgery    (+) hypertension, dysrhythmias (pSVT), hyperlipidemia      Neuro/Psych  (+) numbness (Right sided sciateica), psychiatric history Anxiety  GI/Hepatic/Renal/Endo    (+) hiatal hernia, GERD (delayed gastric emptying), PUD, hepatitis (autoimune), liver disease fatty liver disease cirrhosis, diabetes mellitus type 2    Musculoskeletal     Abdominal    Substance History      OB/GYN          Other   arthritis,   history of cancer (ROCURONIUM ALLERGY, AIRWAY SWELLING / ANAPHYLAXIS REPORTED IN 2014)    ROS/Med Hx Other: ECHO 3/23      Left ventricular systolic function is normal. Calculated left ventricular EF = 65.4% Normal global longitudinal LV strain (GLS) = -24.3%. Left ventricle strain data was reviewed by the physician. Normal left ventricular cavity size and wall thickness noted. All left ventricular wall segments contract normally. Left ventricular diastolic function was normal.  ·  The left atrial cavity is moderately dilated.  ·  There is moderate calcification of the aortic valve. The aortic valve appears trileaflet. Trace aortic valve regurgitation is present. No aortic valve stenosis is present.  ·  Severe mitral annular calcification is present. There is moderate, anterior mitral leaflet thickening present. Trace mitral valve regurgitation is present. No significant mitral valve stenosis is present.  ·  Trace tricuspid valve regurgitation is present. Estimated right ventricular systolic pressure from tricuspid regurgitation is normal (<35 mmHg). Calculated right ventricular systolic pressure from  tricuspid regurgitation is 15.5 mmHg.                  Anesthesia Plan    ASA 3     general     (No aminosteroid NMB    I have reviewed the patient's history and chart with the patient, including all pertinent laboratory results and imaging. I have explained the risks of anesthesia including but not limited to dental damage, corneal abrasion, nerve injury, MI, stroke, aspiration, and death. Patient has agreed to proceed.      /78 (BP Location: Right arm, Patient Position: Sitting)   Pulse 72   Temp 37 °C (98.6 °F) (Oral)   Resp 16   SpO2 96%   )  intravenous induction     Anesthetic plan, risks, benefits, and alternatives have been provided, discussed and informed consent has been obtained with: patient.    CODE STATUS:

## 2025-02-20 LAB
CYTO UR: NORMAL
LAB AP CASE REPORT: NORMAL
LAB AP DIAGNOSIS COMMENT: NORMAL
PATH REPORT.FINAL DX SPEC: NORMAL
PATH REPORT.GROSS SPEC: NORMAL

## 2025-03-04 ENCOUNTER — CLINICAL SUPPORT (OUTPATIENT)
Dept: INTERNAL MEDICINE | Facility: CLINIC | Age: 75
End: 2025-03-04
Payer: MEDICARE

## 2025-03-04 ENCOUNTER — OFFICE VISIT (OUTPATIENT)
Dept: SURGERY | Facility: CLINIC | Age: 75
End: 2025-03-04
Payer: MEDICARE

## 2025-03-04 VITALS
HEIGHT: 64 IN | WEIGHT: 193 LBS | BODY MASS INDEX: 32.95 KG/M2 | OXYGEN SATURATION: 97 % | HEART RATE: 70 BPM | SYSTOLIC BLOOD PRESSURE: 120 MMHG | DIASTOLIC BLOOD PRESSURE: 70 MMHG

## 2025-03-04 DIAGNOSIS — Z09 SURGICAL FOLLOWUP: Primary | ICD-10-CM

## 2025-03-04 DIAGNOSIS — E53.8 B12 DEFICIENCY: Primary | ICD-10-CM

## 2025-03-04 PROCEDURE — 3078F DIAST BP <80 MM HG: CPT | Performed by: SURGERY

## 2025-03-04 PROCEDURE — 96372 THER/PROPH/DIAG INJ SC/IM: CPT | Performed by: PHYSICIAN ASSISTANT

## 2025-03-04 PROCEDURE — 99024 POSTOP FOLLOW-UP VISIT: CPT | Performed by: SURGERY

## 2025-03-04 PROCEDURE — 1159F MED LIST DOCD IN RCRD: CPT | Performed by: SURGERY

## 2025-03-04 PROCEDURE — 3074F SYST BP LT 130 MM HG: CPT | Performed by: SURGERY

## 2025-03-04 PROCEDURE — 1160F RVW MEDS BY RX/DR IN RCRD: CPT | Performed by: SURGERY

## 2025-03-04 RX ORDER — CYANOCOBALAMIN 1000 UG/ML
1000 INJECTION, SOLUTION INTRAMUSCULAR; SUBCUTANEOUS
Status: SHIPPED | OUTPATIENT
Start: 2025-03-04

## 2025-03-04 RX ADMIN — CYANOCOBALAMIN 1000 MCG: 1000 INJECTION, SOLUTION INTRAMUSCULAR; SUBCUTANEOUS at 11:21

## 2025-03-04 NOTE — PROGRESS NOTES
CHIEF COMPLAINT:   Chief Complaint   Patient presents with    Post-op         LAPAROSCOPIC CHOLECYSTECTOMY WITH INTRAOPERATIVE CHOLANGIOGRAM, POSSIBLE OPEN 02/18/25       HISTORY OF PRESENT ILLNESS:  This is a 74 y.o. female who presents for a post-operative visit after undergoing laparoscopic cholecystectomy with cholangiogram on 2/18/2025.  He has been doing very well since surgery with only few episodes of mild right upper quadrant abdominal pain that have come on suddenly and quickly resolved.  She denies any nausea, vomiting, jaundice, or scleral icterus.  She has had mild intermittent diarrhea but notes it has been improving over the last week.    Pathology:   Gallbladder, cholecystectomy:         A.  Chronic cholecystitis (see comment).    PHYSICAL EXAM:  Lungs: Clear  Heart: RRR  ABD: Incisions are healing well without any erythema or signs of infection.  Ext: no significant edema, calves nontender    A/P:  This is a 74 y.o. female patient who is S/P laparoscopic cholecystectomy with cholangiogram on 2/18/2025    She is doing very well.  I discussed the benign pathology findings with her, which surprisingly did not demonstrate a gallbladder polyp.  I suspect the polyp we have been following over the last few years was actually a gallstone that probably passed through the common bile duct.  This would explain her intermittent right upper quadrant abdominal pain she has been feeling as of late.  She can return to all activities as tolerated and see me back on an as-needed basis.    Nevin Lara MD  General, Robotic, and Endoscopic Surgery  [facility]    [address], [address]  [place] [address]  P: [phone]  F: [phone]

## 2025-03-27 RX ORDER — LOSARTAN POTASSIUM 50 MG/1
50 TABLET ORAL DAILY
Qty: 90 TABLET | Refills: 0 | Status: SHIPPED | OUTPATIENT
Start: 2025-03-27

## 2025-03-27 NOTE — TELEPHONE ENCOUNTER
"    Caller: Esme Lemus \"PAT\"    Relationship: Self    PHONE: [phone]     Requested Prescriptions:   Requested Prescriptions     Pending Prescriptions Disp Refills    losartan (COZAAR) 50 MG tablet       Sig: Take 1 tablet by mouth Daily.        Pharmacy where request should be sent: [facility] - [address] [phone] [facility] [phone]      Last office visit with prescribing clinician: 12/9/2024   Last telemedicine visit with prescribing clinician: Visit date not found   Next office visit with prescribing clinician: 6/16/2025     Additional details provided by patient: MED LIST SHOWS AS HISTORICAL MEDICATION BUT PATIENT STATES HER LAST PCP WAS FILLING IT. SHE HAS 5 PILLS LEFT     Does the patient have less than a 3 day supply:  [] Yes  [x] No    Would you like a call back once the refill request has been completed: [] Yes [x] No    If the office needs to give you a call back, can they leave a voicemail: [] Yes [x] No    Juanito Short Rep   03/27/25 08:28 EDT         "

## 2025-04-03 DIAGNOSIS — K21.00 GASTROESOPHAGEAL REFLUX DISEASE WITH ESOPHAGITIS WITHOUT HEMORRHAGE: Primary | ICD-10-CM

## 2025-04-03 RX ORDER — FAMOTIDINE 40 MG/1
40 TABLET, FILM COATED ORAL NIGHTLY
Qty: 90 TABLET | Refills: 3 | Status: SHIPPED | OUTPATIENT
Start: 2025-04-03

## 2025-04-04 ENCOUNTER — CLINICAL SUPPORT (OUTPATIENT)
Dept: INTERNAL MEDICINE | Facility: CLINIC | Age: 75
End: 2025-04-04
Payer: MEDICARE

## 2025-04-04 DIAGNOSIS — E53.8 COBALAMIN DEFICIENCY: Primary | ICD-10-CM

## 2025-04-04 RX ADMIN — CYANOCOBALAMIN 1000 MCG: 1000 INJECTION, SOLUTION INTRAMUSCULAR; SUBCUTANEOUS at 09:48

## 2025-04-07 ENCOUNTER — TELEPHONE (OUTPATIENT)
Dept: GASTROENTEROLOGY | Facility: CLINIC | Age: 75
End: 2025-04-07
Payer: MEDICARE

## 2025-04-07 DIAGNOSIS — K74.69 OTHER CIRRHOSIS OF LIVER: Primary | ICD-10-CM

## 2025-04-07 DIAGNOSIS — K75.4 AUTOIMMUNE HEPATITIS: ICD-10-CM

## 2025-04-07 NOTE — TELEPHONE ENCOUNTER
"  Caller: Esme Lemus \"PAT\"    Relationship: Self    Best call back number: [phone]       What was the call regarding: PATIENT STATES THEY USUALLY HAVE LAB WORK COMPLETED THE WEEK BEFORE AN APPOINTMENT.  PATIENT HAS AN APPOINTMENT ON 4/21 AND WANTS TO MAKE SURE IF LAB WORK IS NEEDED FOR APPOINTMENT SHE HAS IT DONE. PLEASE CONTACT PATIENT AND ADVISE.    "

## 2025-04-14 ENCOUNTER — LAB (OUTPATIENT)
Facility: HOSPITAL | Age: 75
End: 2025-04-14
Payer: MEDICARE

## 2025-04-14 PROCEDURE — 85610 PROTHROMBIN TIME: CPT | Performed by: NURSE PRACTITIONER

## 2025-04-14 PROCEDURE — 82105 ALPHA-FETOPROTEIN SERUM: CPT | Performed by: NURSE PRACTITIONER

## 2025-04-14 PROCEDURE — 82140 ASSAY OF AMMONIA: CPT | Performed by: NURSE PRACTITIONER

## 2025-04-14 PROCEDURE — 85025 COMPLETE CBC W/AUTO DIFF WBC: CPT | Performed by: NURSE PRACTITIONER

## 2025-04-14 PROCEDURE — 80053 COMPREHEN METABOLIC PANEL: CPT | Performed by: NURSE PRACTITIONER

## 2025-04-18 NOTE — PROGRESS NOTES
"Chief Complaint   Patient presents with    Hepatic Disease         History of Present Illness  Patient is a 74-year-old female who presents today for follow-up.  She has a history of autoimmune hepatitis, cirrhosis, colon polyps, diverticulosis, and GERD with esophagitis.     She had recent lab work for monitoring calculation of MELD score that showed MELD score was 11.  There had been a slight increase of liver enzymes which had been stable previously. Alpha-fetoprotein and ammonia levels were normal.  Most recent ultrasound January 2025 that showed stable changes of cirrhosis with no liver lesion but there was an increase in size of a previously seen gallbladder polyp.  She was referred to general surgery for this and underwent a laparoscopic cholecystectomy.    She will be due in December for EGD and colonoscopy.    Patient presents today for follow-up.  Reports she recovered well from cholecystectomy.  She has had some intermittent issues with fecal urgency after eating.    She has continued to experience intermittent abdominal pain, this did not change with her cholecystectomy.  When pain occurs it is generally present to the mid abdomen.  It comes and goes.  She denies any nausea or vomiting.    Bowels have been moving regularly.  Denies any blood in the stool.    Denies any excessive fatigue or confusion.  Denies edema or ascites.     Result Review :       US Liver (01/08/2025 10:24)    Tissue Pathology Exam (02/18/2025 13:58)    Office Visit with Nevin Lara MD (03/04/2025)    Office Visit with Oly Talavera APRN (10/21/2024)    UPPER GI ENDOSCOPY (12/29/2022 12:58)    Tissue Pathology Exam (12/29/2022 13:04)     COLONOSCOPY (12/29/2022 12:58)     Vital Signs:   /80   Pulse 70   Temp 96.4 °F (35.8 °C)   Ht 163 cm (64.17\")   Wt 86 kg (189 lb 8 oz)   SpO2 97%   BMI 32.35 kg/m²     Body mass index is 32.35 kg/m².     Physical Exam  Vitals reviewed.   Constitutional:       General: She is " not in acute distress.     Appearance: She is well-developed.   HENT:      Head: Normocephalic and atraumatic.   Pulmonary:      Effort: Pulmonary effort is normal. No respiratory distress.   Abdominal:      General: Abdomen is flat. A surgical scar is present. Bowel sounds are normal. There is no distension.      Palpations: Abdomen is soft.      Tenderness: There is no abdominal tenderness.   Skin:     General: Skin is dry.      Coloration: Skin is not pale.   Neurological:      Mental Status: She is alert and oriented to person, place, and time.   Psychiatric:         Thought Content: Thought content normal.           Assessment and Plan    Diagnoses and all orders for this visit:    1. Autoimmune hepatitis (Primary)    2. Other cirrhosis of liver    3. Gastroesophageal reflux disease with esophagitis without hemorrhage    4. History of colon polyps         Discussion  Patient presents today for follow-up.  She is up-to-date on monitoring of cirrhosis.  She will be due for ultrasound and lab work in July.    She did have a recent mild increase in transaminases, suspect this may have been due to recent cholecystectomy.  We will continue to monitor.  Will continue azathioprine for autoimmune hepatitis.    Reflux symptoms currently well-controlled with pantoprazole and famotidine and will continue current treatment.  She will be due for EGD and colonoscopy December 2025 which we will schedule at next follow-up.  Will arrange for follow-up in 6 months for reassessment and she was instructed to call for any new or worsening symptoms.          Follow Up   Return in about 6 months (around 10/21/2025).    Patient Instructions   For monitoring of cirrhosis and autoimmune hepatitis, lab work and ultrasound will be due July 2025.    For surveillance of colon polyps and to reevaluate for varices, EGD and colonoscopy will be due December 2025.    For autoimmune hepatitis, continue azathioprine daily as prescribed.

## 2025-04-21 ENCOUNTER — OFFICE VISIT (OUTPATIENT)
Dept: GASTROENTEROLOGY | Facility: CLINIC | Age: 75
End: 2025-04-21
Payer: MEDICARE

## 2025-04-21 VITALS
HEIGHT: 64 IN | WEIGHT: 189.5 LBS | TEMPERATURE: 96.4 F | SYSTOLIC BLOOD PRESSURE: 125 MMHG | OXYGEN SATURATION: 97 % | DIASTOLIC BLOOD PRESSURE: 80 MMHG | BODY MASS INDEX: 32.35 KG/M2 | HEART RATE: 70 BPM

## 2025-04-21 DIAGNOSIS — Z86.0100 HISTORY OF COLON POLYPS: ICD-10-CM

## 2025-04-21 DIAGNOSIS — K74.69 OTHER CIRRHOSIS OF LIVER: ICD-10-CM

## 2025-04-21 DIAGNOSIS — K21.00 GASTROESOPHAGEAL REFLUX DISEASE WITH ESOPHAGITIS WITHOUT HEMORRHAGE: ICD-10-CM

## 2025-04-21 DIAGNOSIS — K75.4 AUTOIMMUNE HEPATITIS: Primary | ICD-10-CM

## 2025-04-21 PROCEDURE — 3079F DIAST BP 80-89 MM HG: CPT | Performed by: NURSE PRACTITIONER

## 2025-04-21 PROCEDURE — 3074F SYST BP LT 130 MM HG: CPT | Performed by: NURSE PRACTITIONER

## 2025-04-21 PROCEDURE — 1160F RVW MEDS BY RX/DR IN RCRD: CPT | Performed by: NURSE PRACTITIONER

## 2025-04-21 PROCEDURE — 1159F MED LIST DOCD IN RCRD: CPT | Performed by: NURSE PRACTITIONER

## 2025-04-21 PROCEDURE — 99214 OFFICE O/P EST MOD 30 MIN: CPT | Performed by: NURSE PRACTITIONER

## 2025-04-21 RX ORDER — PANTOPRAZOLE SODIUM 40 MG/1
40 TABLET, DELAYED RELEASE ORAL DAILY
Qty: 90 TABLET | Refills: 3 | Status: SHIPPED | OUTPATIENT
Start: 2025-04-21

## 2025-04-21 RX ORDER — HYDROCODONE BITARTRATE AND ACETAMINOPHEN 5; 325 MG/1; MG/1
TABLET ORAL
COMMUNITY
Start: 2025-03-31 | End: 2025-04-21

## 2025-04-21 RX ORDER — LATANOPROST 50 UG/ML
SOLUTION/ DROPS OPHTHALMIC
COMMUNITY

## 2025-04-21 RX ORDER — CLINDAMYCIN HYDROCHLORIDE 150 MG/1
1 CAPSULE ORAL EVERY 6 HOURS
COMMUNITY
Start: 2025-03-31 | End: 2025-04-21

## 2025-04-21 NOTE — PATIENT INSTRUCTIONS
For monitoring of cirrhosis and autoimmune hepatitis, lab work and ultrasound will be due July 2025.    For surveillance of colon polyps and to reevaluate for varices, EGD and colonoscopy will be due December 2025.    For autoimmune hepatitis, continue azathioprine daily as prescribed.

## 2025-05-09 ENCOUNTER — CLINICAL SUPPORT (OUTPATIENT)
Dept: INTERNAL MEDICINE | Facility: CLINIC | Age: 75
End: 2025-05-09
Payer: MEDICARE

## 2025-05-09 DIAGNOSIS — E53.8 B12 DEFICIENCY: Primary | ICD-10-CM

## 2025-05-09 RX ADMIN — CYANOCOBALAMIN 1000 MCG: 1000 INJECTION, SOLUTION INTRAMUSCULAR; SUBCUTANEOUS at 09:23

## 2025-06-09 RX ORDER — LOSARTAN POTASSIUM 50 MG/1
50 TABLET ORAL DAILY
Qty: 90 TABLET | Refills: 3 | Status: SHIPPED | OUTPATIENT
Start: 2025-06-09

## 2025-06-10 ENCOUNTER — CLINICAL SUPPORT (OUTPATIENT)
Dept: INTERNAL MEDICINE | Facility: CLINIC | Age: 75
End: 2025-06-10
Payer: MEDICARE

## 2025-06-10 DIAGNOSIS — E11.9 TYPE 2 DIABETES MELLITUS WITHOUT COMPLICATION, WITHOUT LONG-TERM CURRENT USE OF INSULIN: Primary | ICD-10-CM

## 2025-06-10 DIAGNOSIS — E78.00 HYPERCHOLESTEROLEMIA: ICD-10-CM

## 2025-06-10 DIAGNOSIS — E53.8 B12 DEFICIENCY: Primary | ICD-10-CM

## 2025-06-10 PROCEDURE — 96372 THER/PROPH/DIAG INJ SC/IM: CPT | Performed by: PHYSICIAN ASSISTANT

## 2025-06-10 RX ADMIN — CYANOCOBALAMIN 1000 MCG: 1000 INJECTION, SOLUTION INTRAMUSCULAR; SUBCUTANEOUS at 15:58

## 2025-06-11 LAB
ALBUMIN SERPL-MCNC: 4.1 G/DL (ref 3.8–4.8)
ALP SERPL-CCNC: 98 IU/L (ref 44–121)
ALT SERPL-CCNC: 44 IU/L (ref 0–32)
AST SERPL-CCNC: 58 IU/L (ref 0–40)
BILIRUB SERPL-MCNC: 1.5 MG/DL (ref 0–1.2)
BUN SERPL-MCNC: 13 MG/DL (ref 8–27)
BUN/CREAT SERPL: 15 (ref 12–28)
CALCIUM SERPL-MCNC: 9.7 MG/DL (ref 8.7–10.3)
CHLORIDE SERPL-SCNC: 103 MMOL/L (ref 96–106)
CHOLEST SERPL-MCNC: 166 MG/DL (ref 100–199)
CO2 SERPL-SCNC: 21 MMOL/L (ref 20–29)
CREAT SERPL-MCNC: 0.89 MG/DL (ref 0.57–1)
EGFRCR SERPLBLD CKD-EPI 2021: 67 ML/MIN/1.73
GLOBULIN SER CALC-MCNC: 2.5 G/DL (ref 1.5–4.5)
GLUCOSE SERPL-MCNC: 105 MG/DL (ref 70–99)
HBA1C MFR BLD: 6.1 % (ref 4.8–5.6)
HDLC SERPL-MCNC: 93 MG/DL
LDLC SERPL CALC-MCNC: 62 MG/DL (ref 0–99)
POTASSIUM SERPL-SCNC: 4.1 MMOL/L (ref 3.5–5.2)
PROT SERPL-MCNC: 6.6 G/DL (ref 6–8.5)
SODIUM SERPL-SCNC: 140 MMOL/L (ref 134–144)
TRIGL SERPL-MCNC: 52 MG/DL (ref 0–149)
VLDLC SERPL CALC-MCNC: 11 MG/DL (ref 5–40)

## 2025-06-12 DIAGNOSIS — I10 PRIMARY HYPERTENSION: ICD-10-CM

## 2025-06-13 RX ORDER — AMLODIPINE BESYLATE 10 MG/1
10 TABLET ORAL DAILY
Qty: 90 TABLET | Refills: 3 | Status: SHIPPED | OUTPATIENT
Start: 2025-06-13

## 2025-06-16 ENCOUNTER — TELEPHONE (OUTPATIENT)
Dept: GASTROENTEROLOGY | Facility: CLINIC | Age: 75
End: 2025-06-16
Payer: MEDICARE

## 2025-06-16 ENCOUNTER — OFFICE VISIT (OUTPATIENT)
Dept: INTERNAL MEDICINE | Facility: CLINIC | Age: 75
End: 2025-06-16
Payer: MEDICARE

## 2025-06-16 VITALS
WEIGHT: 189 LBS | SYSTOLIC BLOOD PRESSURE: 120 MMHG | HEIGHT: 64 IN | BODY MASS INDEX: 32.27 KG/M2 | DIASTOLIC BLOOD PRESSURE: 76 MMHG | TEMPERATURE: 98 F

## 2025-06-16 DIAGNOSIS — E78.00 HYPERCHOLESTEROLEMIA: ICD-10-CM

## 2025-06-16 DIAGNOSIS — E11.9 TYPE 2 DIABETES MELLITUS WITHOUT COMPLICATION, WITHOUT LONG-TERM CURRENT USE OF INSULIN: ICD-10-CM

## 2025-06-16 DIAGNOSIS — Z12.31 SCREENING MAMMOGRAM, ENCOUNTER FOR: ICD-10-CM

## 2025-06-16 DIAGNOSIS — I10 PRIMARY HYPERTENSION: ICD-10-CM

## 2025-06-16 DIAGNOSIS — K75.4 AUTOIMMUNE HEPATITIS: Primary | ICD-10-CM

## 2025-06-16 DIAGNOSIS — Z00.00 MEDICARE ANNUAL WELLNESS VISIT, SUBSEQUENT: Primary | ICD-10-CM

## 2025-06-16 PROBLEM — K82.4 GALLBLADDER POLYP: Status: RESOLVED | Noted: 2025-01-21 | Resolved: 2025-06-16

## 2025-06-16 PROCEDURE — 3074F SYST BP LT 130 MM HG: CPT | Performed by: PHYSICIAN ASSISTANT

## 2025-06-16 PROCEDURE — G0439 PPPS, SUBSEQ VISIT: HCPCS | Performed by: PHYSICIAN ASSISTANT

## 2025-06-16 PROCEDURE — 1170F FXNL STATUS ASSESSED: CPT | Performed by: PHYSICIAN ASSISTANT

## 2025-06-16 PROCEDURE — 99213 OFFICE O/P EST LOW 20 MIN: CPT | Performed by: PHYSICIAN ASSISTANT

## 2025-06-16 PROCEDURE — 1125F AMNT PAIN NOTED PAIN PRSNT: CPT | Performed by: PHYSICIAN ASSISTANT

## 2025-06-16 PROCEDURE — 3078F DIAST BP <80 MM HG: CPT | Performed by: PHYSICIAN ASSISTANT

## 2025-06-16 RX ORDER — AZATHIOPRINE 50 MG/1
100 TABLET ORAL DAILY
Qty: 180 TABLET | Refills: 1 | Status: SHIPPED | OUTPATIENT
Start: 2025-06-16

## 2025-06-16 NOTE — PROGRESS NOTES
Subjective   The ABCs of the Annual Wellness Visit  Medicare Wellness Visit      Esme Lemus is a 75 y.o. patient who presents for a Medicare Wellness Visit.    The following portions of the patient's history were reviewed and   updated as appropriate: allergies, current medications, past family history, past medical history, past social history, past surgical history, and problem list.    Compared to one year ago, the patient's physical   health is the same.  Compared to one year ago, the patient's mental   health is the same.    Recent Hospitalizations:  She was not admitted to the hospital during the last year.     Current Medical Providers:  Patient Care Team:  Alice Lino PA-C as PCP - General (Physician Assistant)  Javier Toth MD as Consulting Physician (Gastroenterology)  Shaye Coates APRN as Nurse Practitioner (Gastroenterology)  Matheus Moreno MD as Consulting Physician (Pulmonary Disease)  Pattie Rangel MD as Consulting Physician (Orthopedic Surgery)  Oly Talavera APRN as Nurse Practitioner (Nurse Practitioner)  Keila Bragg APRN as Nurse Practitioner (Gastroenterology)    Outpatient Medications Prior to Visit   Medication Sig Dispense Refill    amLODIPine (NORVASC) 10 MG tablet TAKE 1 TABLET DAILY 90 tablet 3    Aspirin Buf,CaCarb-MgCarb-MgO, 81 MG tablet Take 81 mg by mouth Every Night.      atorvastatin (LIPITOR) 20 MG tablet TAKE 1 TABLET DAILY (Patient taking differently: Take 1 tablet by mouth Every Night.) 90 tablet 3    Diclofenac Sodium (VOLTAREN) 1 % gel gel Apply 4 g topically to the appropriate area as directed 4 (Four) Times a Day As Needed (joint pain). 350 g 1    famotidine (PEPCID) 40 MG tablet TAKE 1 TABLET EVERY NIGHT 90 tablet 3    fexofenadine (ALLEGRA) 180 MG tablet Take 1 tablet by mouth Daily.      fluticasone (FLONASE) 50 MCG/ACT nasal spray Administer 2 sprays into the nostril(s) as directed by provider Daily.      furosemide  (LASIX) 20 MG tablet TAKE 1 TABLET DAILY 90 tablet 3    latanoprost (XALATAN) 0.005 % ophthalmic solution INSTILL 1 DROP INTO BOTH EYES IN THE EVENING      losartan (COZAAR) 50 MG tablet TAKE 1 TABLET DAILY 90 tablet 3    LUMIGAN 0.01 % ophthalmic drops Administer 1 drop to both eyes Every Night.      metoprolol succinate XL (TOPROL-XL) 50 MG 24 hr tablet Take 1 tablet by mouth Daily. 8 tablet 0    oxyCODONE-acetaminophen (Percocet) 5-325 MG per tablet Take 1 tablet by mouth Every 4 (Four) Hours As Needed for Moderate Pain. 15 tablet 0    pantoprazole (PROTONIX) 40 MG EC tablet TAKE 1 TABLET DAILY 90 tablet 3    VITAMIN B COMPLEX-C PO Take 1 tablet by mouth Daily.      vitamin D3 125 MCG (5000 UT) capsule capsule Take 1 capsule by mouth Daily.      azaTHIOprine (IMURAN) 50 MG tablet TAKE 1 TABLET DAILY (Patient taking differently: Take 1 tablet by mouth Every Night.) 90 tablet 3    meloxicam (MOBIC) 15 MG tablet Take 1 tablet by mouth Daily As Needed.       Facility-Administered Medications Prior to Visit   Medication Dose Route Frequency Provider Last Rate Last Admin    cyanocobalamin injection 1,000 mcg  1,000 mcg Intramuscular Q28 Days Alice Lino PA-C   1,000 mcg at 06/10/25 1558     Opioid medication/s are on active medication list.  and I have evaluated her active treatment plan and pain score trends (see table).  Vitals:    06/16/25 1018   PainSc: 6      I have reviewed the chart for potential of high risk medication and harmful drug interactions in the elderly.        Aspirin is on active medication list. Aspirin use is indicated based on review of current medical condition/s. Pros and cons of this therapy have been discussed today. Benefits of this medication outweigh potential harm.  Patient has been encouraged to continue taking this medication.  .      Patient Active Problem List   Diagnosis    Gastroesophageal reflux disease    History of Nissen fundoplication    History of colon polyps    Allergic  "rhinitis    Anxiety disorder    AV david re-entry tachycardia    Cobalamin deficiency    Delayed gastric emptying    Diastolic dysfunction    Early satiety    Edema    Fatigue    Fatty liver    Hypercholesterolemia    Hypertensive disorder    Incisional hernia    Knee pain    Lesion of liver    Lumbosacral stenosis    Metabolic syndrome    Obesity with body mass index 30 or greater    Osteoarthritis of right knee    Osteopenia    Paroxysmal SVT (supraventricular tachycardia)    Sciatica of right side    Sleep apnea    Type 2 diabetes mellitus    Esophagitis    Gastric ulcer    Autoimmune hepatitis    Other cirrhosis of liver     Advance Care Planning Advance Directive is not on file.  ACP discussion was held with the patient during this visit. Patient has an advance directive (not in EMR), copy requested.        Objective   Vitals:    25 1018   BP: 120/76   Temp: 98 °F (36.7 °C)   Weight: 85.7 kg (189 lb)   Height: 163 cm (64.17\")   PainSc: 6        Estimated body mass index is 32.27 kg/m² as calculated from the following:    Height as of this encounter: 163 cm (64.17\").    Weight as of this encounter: 85.7 kg (189 lb).    BMI is >= 30 and <35. (Class 1 Obesity). The following options were offered after discussion;: exercise counseling/recommendations and nutrition counseling/recommendations           Does the patient have evidence of cognitive impairment? No  Lab Results   Component Value Date    CHLPL 166 06/10/2025    TRIG 52 06/10/2025    HDL 93 06/10/2025    LDL 62 06/10/2025    VLDL 11 06/10/2025    HGBA1C 6.1 (H) 06/10/2025                                                                                                Health  Risk Assessment    Smoking Status:  Social History     Tobacco Use   Smoking Status Former    Current packs/day: 0.00    Types: Cigarettes    Quit date: 8/15/1980    Years since quittin.8   Smokeless Tobacco Never   Tobacco Comments    Smoked 15 years  2-3 packs daily quit  "     Alcohol Consumption:  Social History     Substance and Sexual Activity   Alcohol Use Yes    Comment: 2 yearly       Fall Risk Screen  CHUCKIEADI Fall Risk Assessment was completed, and patient is at LOW risk for falls.Assessment completed on:2025    Depression Screening   Little interest or pleasure in doing things? Not at all   Feeling down, depressed, or hopeless? Not at all   PHQ-2 Total Score 0      Health Habits and Functional and Cognitive Screenin/16/2025    10:24 AM   Functional & Cognitive Status   Do you have difficulty preparing food and eating? No   Do you have difficulty bathing yourself, getting dressed or grooming yourself? No   Do you have difficulty using the toilet? No   Do you have difficulty moving around from place to place? No   Do you have trouble with steps or getting out of a bed or a chair? No   Current Diet Well Balanced Diet   Dental Exam Up to date   Eye Exam Up to date   Exercise (times per week) 4 times per week   Current Exercises Include Walking   Do you need help using the phone?  No   Are you deaf or do you have serious difficulty hearing?  No   Do you need help to go to places out of walking distance? No   Do you need help shopping? No   Do you need help preparing meals?  No   Do you need help with housework?  No   Do you need help with laundry? No   Do you need help taking your medications? No   Do you need help managing money? No   Do you ever drive or ride in a car without wearing a seat belt? No   Have you felt unusual stress, anger or loneliness in the last month? No   Who do you live with? Spouse   If you need help, do you have trouble finding someone available to you? No   Have you been bothered in the last four weeks by sexual problems? No   Do you have difficulty concentrating, remembering or making decisions? No           Age-appropriate Screening Schedule:  Refer to the list below for future screening recommendations based on patient's age, sex and/or  medical conditions. Orders for these recommended tests are listed in the plan section. The patient has been provided with a written plan.    Health Maintenance List  Health Maintenance   Topic Date Due    TDAP/TD VACCINES (1 - Tdap) Never done    ZOSTER VACCINE (1 of 2) Never done    Hepatitis B (3 of 3 - Risk 3-dose series) 02/24/2023    RSV Vaccine - Adults (1 - 1-dose 75+ series) Never done    DIABETIC FOOT EXAM  06/02/2024    COVID-19 Vaccine (7 - Pfizer risk 2024-25 season) 03/11/2025    ANNUAL WELLNESS VISIT  06/14/2025    INFLUENZA VACCINE  07/01/2025    URINE MICROALBUMIN-CREATININE RATIO (uACR)  12/03/2025    HEMOGLOBIN A1C  12/10/2025    COLORECTAL CANCER SCREENING  12/29/2025    DIABETIC EYE EXAM  04/01/2026    DXA SCAN  05/29/2026    LIPID PANEL  06/10/2026    HEPATITIS C SCREENING  Completed    Pneumococcal Vaccine 50+  Completed    MAMMOGRAM  Discontinued                                                                                                                                                CMS Preventative Services Quick Reference  Risk Factors Identified During Encounter  Immunizations Discussed/Encouraged: Tdap and Shingrix    The above risks/problems have been discussed with the patient.  Pertinent information has been shared with the patient in the After Visit Summary.  An After Visit Summary and PPPS were made available to the patient.    Follow Up:   Next Medicare Wellness visit to be scheduled in 1 year.         Additional E&M Note during same encounter follows:  Patient has additional, significant, and separately identifiable condition(s)/problem(s) that require work above and beyond the Medicare Wellness Visit     Chief Complaint  Medicare Wellness-subsequent    Subjective   HPI            Pt is here today for medicare wellness and fup on her DM, HTN, HLD. Continues to have some knee pain- Voltaren gel helps. Had a cholecystectomy earlier this year, occasional has diarrhea after eating-  "dependent on what she eats. Denies CP and SOA. She takes 2 Advil every night before bed and sometimes will take 2 during the day.     Objective   Vital Signs:  /76   Temp 98 °F (36.7 °C)   Ht 163 cm (64.17\")   Wt 85.7 kg (189 lb)   BMI 32.27 kg/m²   Physical Exam  Vitals reviewed.   Constitutional:       Appearance: She is well-developed.   HENT:      Head: Normocephalic and atraumatic.   Cardiovascular:      Rate and Rhythm: Normal rate and regular rhythm.      Heart sounds: Normal heart sounds, S1 normal and S2 normal.   Pulmonary:      Effort: Pulmonary effort is normal.      Breath sounds: Normal breath sounds.   Skin:     General: Skin is warm.   Neurological:      Mental Status: She is alert.   Psychiatric:         Behavior: Behavior normal.              Assessment and Plan         Medicare annual wellness visit, subsequent    Type 2 diabetes mellitus without complication, without long-term current use of insulin    Hypercholesterolemia     Primary hypertension    Screening mammogram, encounter for    Diagnoses and all orders for this visit:    1. Medicare annual wellness visit, subsequent (Primary)    2. Type 2 diabetes mellitus without complication, without long-term current use of insulin  Assessment & Plan:      Orders:  -     Comprehensive Metabolic Panel; Future  -     Microalbumin / Creatinine Urine Ratio - Urine, Clean Catch; Future  -     Lipid Panel With / Chol / HDL Ratio; Future  -     TSH; Future  -     Hemoglobin A1c; Future    3. Hypercholesterolemia  Overview:  Description: (PL)    Assessment & Plan:         4. Primary hypertension  Overview:  Description: (PL)    Assessment & Plan:        5. Screening mammogram, encounter for  -     Mammo Screening Digital Tomosynthesis Bilateral With CAD; Future     A1c is well controlled with diet. Lipids to goal. Transaminases slightly increased as 2 mo ago will reach out to GI so they can review. BP is well controlled. Recommended Shingrix and " Tdap at the pharmacy. Due for mammogram.           Follow Up   Return in about 6 months (around 12/16/2025) for Lab Appt Before FUP.  Patient was given instructions and counseling regarding her condition or for health maintenance advice. Please see specific information pulled into the AVS if appropriate.

## 2025-06-16 NOTE — TELEPHONE ENCOUNTER
Please let patient know I received a copy of her recent blood work from her primary care provider.  Her liver enzymes remain elevated.  Due to the persistent elevation, I would recommend that we try to increase dose of azathioprine for autoimmune hepatitis.  Would recommend increasing to 100 mg daily.  Please recheck labs 2 weeks after increasing dose.  I placed orders & sent in new prescription to her pharmacy.

## 2025-06-17 NOTE — TELEPHONE ENCOUNTER
RN attempted to reach patient to discuss provider recommendations. Unable to leave voicemail, no mailbox set-up. Will continue throughout day to reach out to patient. EL

## 2025-07-02 ENCOUNTER — TELEPHONE (OUTPATIENT)
Dept: GASTROENTEROLOGY | Facility: CLINIC | Age: 75
End: 2025-07-02
Payer: MEDICARE

## 2025-07-02 NOTE — TELEPHONE ENCOUNTER
"        Hub staff attempted to follow warm transfer process and was unsuccessful     Caller: Esme Lemus \"PAT\"    Relationship to patient: Self    Best call back number: [phone] (home)     Patient is needing: PT IS CALLING TO TALK TO SOMEONE IN REGARDS TO MEDICATION azaTHIOprine (IMURAN) 50 MG tablet . PT STATES SHE CALLED AND HIT [facility] AND LEFT VM LAST LAST MONDAY 06/23/25 AND HAS NOT RECEIVED A CALL BACK YET. PLEASE CALL PT BACK AND ADVISE ON HOW TO TAKE MEDICATION. OKAY TO LEAVE INFO WITH  OR ON VM IF YOU CANNOT REACH PT.          "

## 2025-07-07 ENCOUNTER — HOSPITAL ENCOUNTER (OUTPATIENT)
Dept: ULTRASOUND IMAGING | Facility: HOSPITAL | Age: 75
Discharge: HOME | End: 2025-07-07
Admitting: NURSE PRACTITIONER
Payer: MEDICARE

## 2025-07-07 DIAGNOSIS — K74.69 OTHER CIRRHOSIS OF LIVER: ICD-10-CM

## 2025-07-07 PROCEDURE — 76705 ECHO EXAM OF ABDOMEN: CPT

## 2025-07-11 ENCOUNTER — HOSPITAL ENCOUNTER (OUTPATIENT)
Dept: MAMMOGRAPHY | Facility: HOSPITAL | Age: 75
Discharge: HOME | End: 2025-07-11
Admitting: PHYSICIAN ASSISTANT
Payer: MEDICARE

## 2025-07-11 DIAGNOSIS — Z12.31 SCREENING MAMMOGRAM, ENCOUNTER FOR: ICD-10-CM

## 2025-07-11 PROCEDURE — 77067 SCR MAMMO BI INCL CAD: CPT

## 2025-07-11 PROCEDURE — 77063 BREAST TOMOSYNTHESIS BI: CPT

## 2025-07-14 ENCOUNTER — CLINICAL SUPPORT (OUTPATIENT)
Dept: INTERNAL MEDICINE | Facility: CLINIC | Age: 75
End: 2025-07-14
Payer: MEDICARE

## 2025-07-14 DIAGNOSIS — E53.8 COBALAMIN DEFICIENCY: Primary | ICD-10-CM

## 2025-07-14 PROCEDURE — 96372 THER/PROPH/DIAG INJ SC/IM: CPT | Performed by: PHYSICIAN ASSISTANT

## 2025-07-14 RX ADMIN — CYANOCOBALAMIN 1000 MCG: 1000 INJECTION, SOLUTION INTRAMUSCULAR; SUBCUTANEOUS at 10:00

## 2025-07-23 ENCOUNTER — TELEPHONE (OUTPATIENT)
Dept: GASTROENTEROLOGY | Facility: CLINIC | Age: 75
End: 2025-07-23
Payer: MEDICARE

## 2025-07-23 NOTE — TELEPHONE ENCOUNTER
"RN returned call to patient. Pt was wanting to discuss results. Pt had labwork sent over from her PCP in June. RN tried calling patient and was unable to reach her. When patient returned call she reports no one discussed lab work. Pt has since had US Liver and it is stable. Pt's azathioprine was increased after the lab work from PCP. Pt reports that she did begin the new dose and has been taking, however, she reports that she \"does not feel right since starting\" . Pt reports that she feels \"her mind is not right, foggy\". RN encouraged patient to go get ordered lab work and request any recent labs from patient's PCP. Please advise. EL   "

## 2025-07-23 NOTE — TELEPHONE ENCOUNTER
Patient called and left message, stating she was returning a call regarding her results; requesting call back. bella

## 2025-07-28 DIAGNOSIS — E11.9 TYPE 2 DIABETES MELLITUS WITHOUT COMPLICATION, WITHOUT LONG-TERM CURRENT USE OF INSULIN: ICD-10-CM

## 2025-07-29 ENCOUNTER — TELEPHONE (OUTPATIENT)
Dept: GASTROENTEROLOGY | Facility: CLINIC | Age: 75
End: 2025-07-29
Payer: MEDICARE

## 2025-07-29 DIAGNOSIS — K75.4 AUTOIMMUNE HEPATITIS: Primary | ICD-10-CM

## 2025-07-29 LAB
ALBUMIN SERPL-MCNC: 4 G/DL (ref 3.5–5.2)
ALBUMIN/CREAT UR: 11 MG/G CREAT (ref 0–29)
ALBUMIN/GLOB SERPL: 1.5 G/DL
ALP SERPL-CCNC: 105 U/L (ref 39–117)
ALT SERPL-CCNC: 118 U/L (ref 1–33)
AST SERPL-CCNC: 125 U/L (ref 1–32)
BILIRUB SERPL-MCNC: 1.8 MG/DL (ref 0–1.2)
BUN SERPL-MCNC: 13 MG/DL (ref 8–23)
BUN/CREAT SERPL: 16.3 (ref 7–25)
CALCIUM SERPL-MCNC: 10.2 MG/DL (ref 8.6–10.5)
CHLORIDE SERPL-SCNC: 103 MMOL/L (ref 98–107)
CHOLEST SERPL-MCNC: 173 MG/DL (ref 0–200)
CHOLEST/HDLC SERPL: 1.86 {RATIO}
CO2 SERPL-SCNC: 23.3 MMOL/L (ref 22–29)
CREAT SERPL-MCNC: 0.8 MG/DL (ref 0.57–1)
CREAT UR-MCNC: 81.7 MG/DL
EGFRCR SERPLBLD CKD-EPI 2021: 76.5 ML/MIN/1.73
GLOBULIN SER CALC-MCNC: 2.7 GM/DL
GLUCOSE SERPL-MCNC: 105 MG/DL (ref 65–99)
HBA1C MFR BLD: 6.3 % (ref 4.8–5.6)
HDLC SERPL-MCNC: 93 MG/DL (ref 40–60)
LDLC SERPL CALC-MCNC: 70 MG/DL (ref 0–100)
MICROALBUMIN UR-MCNC: 9.1 UG/ML
POTASSIUM SERPL-SCNC: 4 MMOL/L (ref 3.5–5.2)
PROT SERPL-MCNC: 6.7 G/DL (ref 6–8.5)
SODIUM SERPL-SCNC: 139 MMOL/L (ref 136–145)
TRIGL SERPL-MCNC: 51 MG/DL (ref 0–150)
TSH SERPL DL<=0.005 MIU/L-ACNC: 1.13 UIU/ML (ref 0.27–4.2)
VLDLC SERPL CALC-MCNC: 10 MG/DL (ref 5–40)

## 2025-07-29 NOTE — TELEPHONE ENCOUNTER
Please let patient know I received a copy of her recent labs from her primary care provider.  Her liver enzymes have increased.  Due to the increasing enzymes when we increased dose of azathioprine, I would like her to stop taking the azathioprine.  Azathioprine can cause liver function test to elevate.  Stop taking azathioprine and recheck hepatic function panel in around 3 or 4 weeks for reevaluation.  I placed order and she can go to lab to have blood drawn at her convenience.

## 2025-07-29 NOTE — TELEPHONE ENCOUNTER
RN reached out to patient to discuss discontinuation of medication: azathioprine (Imuran) 50 mg. Possibility the medication is causing a further increase in her liver enzymes. Pt verbalized understanding and is agreeable. RN and patient discussed follow-up lab work around the end of August. EL

## 2025-08-15 ENCOUNTER — OFFICE VISIT (OUTPATIENT)
Dept: INTERNAL MEDICINE | Facility: CLINIC | Age: 75
End: 2025-08-15
Payer: MEDICARE

## 2025-08-15 VITALS
HEIGHT: 64 IN | DIASTOLIC BLOOD PRESSURE: 78 MMHG | WEIGHT: 187.2 LBS | BODY MASS INDEX: 31.96 KG/M2 | SYSTOLIC BLOOD PRESSURE: 122 MMHG

## 2025-08-15 DIAGNOSIS — R05.1 ACUTE COUGH: Primary | ICD-10-CM

## 2025-08-15 PROCEDURE — 87428 SARSCOV & INF VIR A&B AG IA: CPT | Performed by: STUDENT IN AN ORGANIZED HEALTH CARE EDUCATION/TRAINING PROGRAM

## 2025-08-15 PROCEDURE — 3074F SYST BP LT 130 MM HG: CPT | Performed by: STUDENT IN AN ORGANIZED HEALTH CARE EDUCATION/TRAINING PROGRAM

## 2025-08-15 PROCEDURE — 99213 OFFICE O/P EST LOW 20 MIN: CPT | Performed by: STUDENT IN AN ORGANIZED HEALTH CARE EDUCATION/TRAINING PROGRAM

## 2025-08-15 PROCEDURE — 3078F DIAST BP <80 MM HG: CPT | Performed by: STUDENT IN AN ORGANIZED HEALTH CARE EDUCATION/TRAINING PROGRAM

## 2025-08-15 PROCEDURE — 1125F AMNT PAIN NOTED PAIN PRSNT: CPT | Performed by: STUDENT IN AN ORGANIZED HEALTH CARE EDUCATION/TRAINING PROGRAM

## 2025-08-15 RX ORDER — CEFDINIR 300 MG/1
300 CAPSULE ORAL 2 TIMES DAILY
Qty: 10 CAPSULE | Refills: 0 | Status: SHIPPED | OUTPATIENT
Start: 2025-08-15

## 2025-08-18 ENCOUNTER — CLINICAL SUPPORT (OUTPATIENT)
Dept: INTERNAL MEDICINE | Facility: CLINIC | Age: 75
End: 2025-08-18
Payer: MEDICARE

## 2025-08-18 DIAGNOSIS — E53.8 COBALAMIN DEFICIENCY: Primary | ICD-10-CM

## 2025-08-18 LAB
EXPIRATION DATE: NORMAL
FLUAV AG UPPER RESP QL IA.RAPID: NOT DETECTED
FLUBV AG UPPER RESP QL IA.RAPID: NOT DETECTED
INTERNAL CONTROL: NORMAL
Lab: NORMAL
SARS-COV-2 AG UPPER RESP QL IA.RAPID: NOT DETECTED

## 2025-08-18 PROCEDURE — 96372 THER/PROPH/DIAG INJ SC/IM: CPT | Performed by: PHYSICIAN ASSISTANT

## 2025-08-18 RX ADMIN — CYANOCOBALAMIN 1000 MCG: 1000 INJECTION, SOLUTION INTRAMUSCULAR; SUBCUTANEOUS at 10:00

## (undated) DEVICE — Device

## (undated) DEVICE — FLEX ADVANTAGE 1500CC: Brand: FLEX ADVANTAGE

## (undated) DEVICE — DISPOSABLE MONOPOLAR ENDOSCOPIC CORD 10 FT. (3M): Brand: KIRWAN

## (undated) DEVICE — ENDOPATH XCEL BLUNT TIP TROCARS WITH SMOOTH SLEEVES: Brand: ENDOPATH XCEL

## (undated) DEVICE — GOWN ,SIRUS,NONREINFORCED 3XL: Brand: MEDLINE

## (undated) DEVICE — GLV SURG BIOGEL LTX PF 6

## (undated) DEVICE — SNAR POLYP SENSATION STDOVL 27 240 BX40

## (undated) DEVICE — SINGLE-USE POLYPECTOMY SNARE: Brand: SENSATION SHORT THROW

## (undated) DEVICE — TRAP POLYP 4CHAMBER

## (undated) DEVICE — GOWN ISOL W/THUMB UNIV BLU BX/15

## (undated) DEVICE — CANN NASL CO2 TRULINK W/O2 A/

## (undated) DEVICE — LASSO POLYPECTOMY SNARE: Brand: LASSO

## (undated) DEVICE — VIAL FORMLN CAP 10PCT 20ML

## (undated) DEVICE — GLV SURG SENSICARE POLYISPRN W/ALOE PF LF 6.5 GRN STRL

## (undated) DEVICE — SUT VIC 0 UR6 27IN VCP603H

## (undated) DEVICE — GOWN,SIRUS,NON REINFRCD,LARGE,SET IN SL: Brand: MEDLINE

## (undated) DEVICE — PK LAP CHOLE BG

## (undated) DEVICE — SINGLE-USE BIOPSY FORCEPS: Brand: RADIAL JAW 4

## (undated) DEVICE — MSK ENDO PORT O2 POM ELITE CURAPLEX A/

## (undated) DEVICE — ANTIBACTERIAL UNDYED BRAIDED (POLYGLACTIN 910), SYNTHETIC ABSORBABLE SUTURE: Brand: COATED VICRYL

## (undated) DEVICE — EXOFIN PRECISION PEN HIGH VISCOSITY TOPICAL SKIN ADHESIVE: Brand: EXOFIN PRECISION PEN, 1G

## (undated) DEVICE — KT ORCA ORCAPOD DISP STRL

## (undated) DEVICE — SYR LL TP 10ML STRL

## (undated) DEVICE — ENDOPOUCH RETRIEVER SPECIMEN RETRIEVAL BAGS: Brand: ENDOPOUCH RETRIEVER

## (undated) DEVICE — JACKT LAB F/R KNIT CUFF/COLR XLG BLU

## (undated) DEVICE — SYR LUERLOK 20CC BX/50

## (undated) DEVICE — SOL NACL 0.9PCT 1000ML

## (undated) DEVICE — LAPAROVUE VISIBILITY SYSTEM LAPAROSCOPIC SOLUTIONS: Brand: LAPAROVUE

## (undated) DEVICE — BITEBLOCK OMNI BLOC

## (undated) DEVICE — INSUFFLATION TUBING SET, WITH GAS FILTER: Brand: N.A.

## (undated) DEVICE — STPCK 3WY D201 DISCOFIX